# Patient Record
Sex: MALE | Race: WHITE | NOT HISPANIC OR LATINO | Employment: FULL TIME | URBAN - METROPOLITAN AREA
[De-identification: names, ages, dates, MRNs, and addresses within clinical notes are randomized per-mention and may not be internally consistent; named-entity substitution may affect disease eponyms.]

---

## 2017-02-02 ENCOUNTER — TRANSCRIBE ORDERS (OUTPATIENT)
Dept: ADMINISTRATIVE | Facility: HOSPITAL | Age: 57
End: 2017-02-02

## 2017-02-02 DIAGNOSIS — C64.9 ADENOCARCINOMA, RENAL CELL, UNSPECIFIED LATERALITY (HCC): Primary | ICD-10-CM

## 2017-02-20 ENCOUNTER — HOSPITAL ENCOUNTER (OUTPATIENT)
Dept: CT IMAGING | Facility: HOSPITAL | Age: 57
Discharge: HOME | End: 2017-02-20
Payer: COMMERCIAL

## 2017-02-20 DIAGNOSIS — C64.9 ADENOCARCINOMA, RENAL CELL, UNSPECIFIED LATERALITY (HCC): ICD-10-CM

## 2017-02-21 ENCOUNTER — HOSPITAL ENCOUNTER (OUTPATIENT)
Dept: CT IMAGING | Facility: HOSPITAL | Age: 57
Discharge: HOME | End: 2017-02-21
Payer: COMMERCIAL

## 2017-02-21 PROCEDURE — 74176 CT ABD & PELVIS W/O CONTRAST: CPT

## 2017-04-04 ENCOUNTER — GENERIC CONVERSION - ENCOUNTER (OUTPATIENT)
Dept: OTHER | Facility: OTHER | Age: 57
End: 2017-04-04

## 2017-08-04 ENCOUNTER — TRANSCRIBE ORDERS (OUTPATIENT)
Dept: ADMINISTRATIVE | Facility: HOSPITAL | Age: 57
End: 2017-08-04

## 2017-08-04 DIAGNOSIS — C61 MALIGNANT NEOPLASM PROSTATE (HCC): Primary | ICD-10-CM

## 2017-08-04 DIAGNOSIS — C61 MALIGNANT NEOPLASM OF PROSTATE (HCC): ICD-10-CM

## 2017-08-04 DIAGNOSIS — C64.1 RENAL CARCINOMA, RIGHT (HCC): ICD-10-CM

## 2017-08-14 ENCOUNTER — HOSPITAL ENCOUNTER (OUTPATIENT)
Dept: RADIOLOGY | Facility: HOSPITAL | Age: 57
Discharge: HOME | End: 2017-08-14
Payer: COMMERCIAL

## 2017-08-14 ENCOUNTER — HOSPITAL ENCOUNTER (OUTPATIENT)
Dept: CT IMAGING | Facility: HOSPITAL | Age: 57
Discharge: HOME | End: 2017-08-14
Payer: COMMERCIAL

## 2017-08-14 DIAGNOSIS — C61 MALIGNANT NEOPLASM PROSTATE (HCC): ICD-10-CM

## 2017-08-14 DIAGNOSIS — C64.1 RENAL CARCINOMA, RIGHT (HCC): ICD-10-CM

## 2017-08-14 PROCEDURE — 74176 CT ABD & PELVIS W/O CONTRAST: CPT

## 2017-08-14 PROCEDURE — 71020 HB CHEST X-RAY 2VW FRONTAL&LATL: CPT

## 2017-12-28 ENCOUNTER — ALLSCRIPTS OFFICE VISIT (OUTPATIENT)
Dept: OTHER | Facility: OTHER | Age: 57
End: 2017-12-28

## 2017-12-28 ENCOUNTER — TRANSCRIBE ORDERS (OUTPATIENT)
Dept: ADMINISTRATIVE | Facility: HOSPITAL | Age: 57
End: 2017-12-28

## 2017-12-28 DIAGNOSIS — M54.5 LOW BACK PAIN, UNSPECIFIED BACK PAIN LATERALITY, UNSPECIFIED CHRONICITY, WITH SCIATICA PRESENCE UNSPECIFIED: Primary | ICD-10-CM

## 2017-12-28 DIAGNOSIS — M48.00 SPINAL STENOSIS: ICD-10-CM

## 2017-12-29 NOTE — PROGRESS NOTES
Assessment   1  Spinal stenosis (724 00) (M48 00)   2  Lumbar foraminal stenosis (724 02) (M99 83)   3  Lumbar spondylosis (721 3) (M47 816)    Plan   Spinal stenosis    · * MRI LUMBAR SPINE WO CONTRAST; Status:Need Information - Financial    Authorization; Requested for:[id number];     Discussion/Summary      He will need a repeat fresh MRI to determine where we will go next  I reminded him that physical therapy would be worthwhile and possibly seeing Pain Management for the consideration of some epidural steroids  Chief Complaint   PT IS HERE FOR WORSENING OF HIS LOWER BACK PAIN  PT IS COMPLETING HIS PHQ-9  PT WILL SCHEDULE FOR FBW AND PHYSICAL  Advance Directives   [facility]    The patient is not in agreement to receive the Advance Care Planning service--       NO - Patient does not have an advance health care directive  History of Present Illness   HPI: The patient is here with complaints of worsening low back pain  Two years ago he was evaluated for spinal stenosis and worsening pain in his low back  At the time it was found that he had a mass on his kidney  He never had further follow-up since he was then referred to Urology decided to go to the [address] [place] [address] had a nephrectomy done then was diagnosed with prostate cancer and had treatment for that  He reports that he has been under the care of the urologist but now his back pain is worse than ever  He can work for approximately 5 minutes and then has to stop due to a squeezing sensation in his low back radiating down the legs  He denies any saddle numbness or incontinence  He is unable to take anti-inflammatories due to the solitary kidney  Review of Systems        Constitutional: no fever or chills, feels well, no tiredness, no recent weight loss or weight gain  ENT: no complaints of earache, no loss of hearing, no nosebleeds or nasal discharge, no sore throat or hoarseness        Cardiovascular: no complaints of slow or fast heart rate, no chest pain, no palpitations, no leg claudication or lower extremity edema  Respiratory: no complaints of shortness of breath, no wheezing or cough, no dyspnea on exertion, no orthopnea or PND  Gastrointestinal: no complaints of abdominal pain, no constipation, no nausea or vomiting, no diarrhea or bloody stools  Genitourinary: no complaints of dysuria or incontinence, no hesitancy, no nocturia, no genital lesion, no inadequacy of penile erection  Musculoskeletal: Low back pain  Integumentary: no complaints of skin rash or lesion, no itching or dry skin, no skin wounds  Neurological: no complaints of headache, no confusion, no numbness or tingling, no dizziness or fainting  Active Problems   1  Abnormal behavior (796 4) (R46 89)   2  Accelerated essential hypertension (401 0) (I10)   3  Atypical chest pain (786 59) (R07 89)   4  Benign essential hypertension (401 1) (I10)   5  CA of prostate (185) (C61)   6  Counseling About Travel   7  De Quervain's tenosynovitis (727 04) (M65 4)   8  Encounter for screening for malignant neoplasm of colon (V76 51) (Z12 11)   9  Esophageal reflux (530 81) (K21 9)   10  Fatigue (780 79) (R53 83)   11  Hematuria (599 70) (R31 9)   12  Hyperlipidemia (272 4) (E78 5)   13  Impaired fasting glucose (790 21) (R73 01)   14  Lower back pain (724 2) (M54 5)   15  Need for influenza vaccination (V04 81) (Z23)   16  Nonvenomous insect bite (919 4,E906 4) (W57 XXXA)   17  Obesity (278 00) (E66 9)   18  Plantar fasciitis (728 71) (M72 2)   19  Polyp of sigmoid colon (211 3) (D12 5)   20  Renal cell carcinoma, right (189 0) (C64 1)   21  Right kidney mass (593 9) (N28 89)   22  Screening PSA (prostate specific antigen) (V76 44) (Z12 5)   23  Seizure, epileptic (345 90) (G40 909)   24  Sleep apnea (780 57) (G47 30)   25  Spinal stenosis (724 00) (M48 00)   26  Symptoms involving urinary system (788 99) (R39 9)   41  Tendonitis (726 90) (M77 9)   28  Tick bite (919 4,E906 4) [facility])    Past Medical History   1  History of back problems   2  History of hyperglycemia (V12 29) (Z86 39)   3  History of Lightheadedness (780 4) (R42)   4  History of Malignant Melanoma Of The Skin (V10 82)   5  History of Microscopic hematuria (599 72) (R31 29)  Active Problems And Past Medical History Reviewed: The active problems and past medical history were reviewed and updated today  Family History   Mother    1  Family history of essential hypertension (V17 49) (Z82 49)  Father    2  Family history of diabetes mellitus (V18 0) (Z83 3)   3  Family history of Involuntary Shaking Or Trembling Movements (Tremor)  Brother    4  Family history of essential hypertension (V17 49) (Z82 49)  Family History Reviewed: The family history was reviewed and updated today  Social History    ·    · Never A Smoker   · Never Drank Alcohol   · Nonsmoker (V49 89) (Z78 9)  The social history was reviewed and updated today  Surgical History   1  History of Cholecystectomy   2  History of Hernia Repair  Surgical History Reviewed: The surgical history was reviewed and updated today  Current Meds    1  Phenytoin Sodium Extended 100 MG Oral Capsule; TAKE 1 CAPSULE 3 TIMES DAILY; Therapy: 04FMU2342 to (Evaluate:61Eiq5244)  Requested for: 37GEZ0513; Last     Rx:17Oct2017 Ordered   2  Valsartan-Hydrochlorothiazide 160-12 5 MG Oral Tablet; take 1 tablet by mouth daily; Therapy: 05SZU4148 to (Evaluate:98Gzk6267)  Requested for: 11Aug2017; Last     Rx:11Aug2017 Ordered     The medication list was reviewed and updated today  Allergies   1  Advair Diskus MISC   2   Cipro SOLN    Vitals    Recorded: 89Syy7513 02:18PM   Temperature 97 4 F   Heart Rate 72   Systolic 183   Diastolic 99   Height 5 ft 7 in   Weight 246 lb    BMI Calculated 38 53   BSA Calculated 2 21   O2 Saturation 97     Physical Exam        Constitutional General appearance: No acute distress, well appearing and well nourished  Eyes      Conjunctiva and lids: No swelling, erythema, or discharge  Pupils and irises: Equal, round and reactive to light  Ears, Nose, Mouth, and Throat      External inspection of ears and nose: Normal        Otoscopic examination: Tympanic membrance translucent with normal light reflex  Canals patent without erythema  Nasal mucosa, septum, and turbinates: Normal without edema or erythema  Oropharynx: Normal with no erythema, edema, exudate or lesions  Pulmonary      Respiratory effort: No increased work of breathing or signs of respiratory distress  Auscultation of lungs: Clear to auscultation, equal breath sounds bilaterally, no wheezes, no rales, no rhonci  Cardiovascular      Auscultation of heart: Normal rate and rhythm, normal S1 and S2, without murmurs  Examination of extremities for edema and/or varicosities: Normal        Carotid pulses: Normal        Abdomen      Abdomen: Abnormal  -- Obesity  Liver and spleen: No hepatomegaly or splenomegaly  Lymphatic      Palpation of lymph nodes in neck: No lymphadenopathy  Musculoskeletal      Gait and station: Normal        Digits and nails: Normal without clubbing or cyanosis  Inspection/palpation of joints, bones, and muscles: Abnormal  -- Tender across lumbar spine  Skin      Skin and subcutaneous tissue: Normal without rashes or lesions  Neurologic      Cranial nerves: Cranial nerves 2-12 intact  Reflexes: Abnormal  -- Absent patellar tender reflexes bilaterally  Sensation: No sensory loss         Psychiatric      Orientation to person, place and time: Normal        Mood and affect: Normal           Signatures    Electronically signed by : Juan M Farmer DO; Dec 28 2017  2:56PM EST                       (Author)

## 2018-01-09 ENCOUNTER — GENERIC CONVERSION - ENCOUNTER (OUTPATIENT)
Dept: OTHER | Facility: OTHER | Age: 58
End: 2018-01-09

## 2018-01-09 ENCOUNTER — HOSPITAL ENCOUNTER (OUTPATIENT)
Dept: MRI IMAGING | Facility: HOSPITAL | Age: 58
Discharge: HOME | End: 2018-01-09
Payer: COMMERCIAL

## 2018-01-09 DIAGNOSIS — M48.00 SPINAL STENOSIS: ICD-10-CM

## 2018-01-09 PROCEDURE — 72148 MRI LUMBAR SPINE W/O DYE: CPT

## 2018-01-16 NOTE — RESULT NOTES
Verified Results  (LC) PSA Total (Reflex To Free) 01PRH1471 12:00AM Dara Tobar     Test Name Result Flag Reference   Prostate Specific Ag, Serum 4 0 ng/mL  0 0-4 0   Roche ECLIA methodology  According to the American Urological Association, Serum PSA should  decrease and remain at undetectable levels after radical  prostatectomy  The AUA defines biochemical recurrence as an initial  PSA value 0 2 ng/mL or greater followed by a subsequent confirmatory  PSA value 0 2 ng/mL or greater  Values obtained with different assay methods or kits cannot be used  interchangeably  Results cannot be interpreted as absolute evidence  of the presence or absence of malignant disease  Reflex Criteria Comment     The percent free PSA is performed on a reflex basis only when the  total PSA is between 4 0 and 10 0 ng/mL  PSA, Free 0 78 ng/mL  N/A   Roche ECLIA methodology  % Free PSA 19 5 %     The table below lists the probability of prostate cancer for  men with non-suspicious JERICHO results and total PSA between  4 and 10 ng/mL, by patient age [facility], [address] [place] [address],  [phone])  % Free PSA       50-64 yr        65-75 yr                    0 00-10 00%        56%             55%                   10 01-15 00%        24%             35%                   15 01-20 00%        17%             23%                   20 01-25 00%        10%             20%                        >25 00%         5%              9%  Please note:  Claudia et al did not make specific                recommendations regarding the use of                percent free PSA for any other population                of men  Discussion/Summary   PSA is slightly higher at 4 0  He should be seen by urology with specific attention to the fact that his PSA is elevated at 4 0

## 2018-01-16 NOTE — RESULT NOTES
Verified Results  (1) BASIC METABOLIC PROFILE [id number] 12:00AM Evan Bejarano     Test Name Result Flag Reference   Glucose, Serum 101 mg/dL H 65-99   BUN 19 mg/dL  6-24   Creatinine, Serum 1 22 mg/dL  0 76-1 27   eGFR If NonAfricn Am 65 mL/min/1 73  >59   eGFR If Africn Am 76 mL/min/1 73  >59   BUN/Creatinine Ratio 16  9-20   Sodium, Serum 142 mmol/L  134-144   Potassium, Serum 4 7 mmol/L  3 5-5 2   Chloride, Serum 103 mmol/L     Carbon Dioxide, Total 22 mmol/L  18-29   Calcium, Serum 9 0 mg/dL  8 7-10 2       Discussion/Summary   lab is normal

## 2018-01-23 ENCOUNTER — ALLSCRIPTS OFFICE VISIT (OUTPATIENT)
Dept: OTHER | Facility: OTHER | Age: 58
End: 2018-01-23

## 2018-01-23 VITALS
OXYGEN SATURATION: 97 % | BODY MASS INDEX: 38.61 KG/M2 | HEIGHT: 67 IN | TEMPERATURE: 97.4 F | HEART RATE: 72 BPM | DIASTOLIC BLOOD PRESSURE: 99 MMHG | SYSTOLIC BLOOD PRESSURE: 138 MMHG | WEIGHT: 246 LBS

## 2018-01-23 NOTE — RESULT NOTES
Discussion/Summary   lumbar spine with lots of degenerative disease  first step id physical therapy and evaluation by pain management     Verified Results  * MRI LUMBAR SPINE [address] [id number] 06:28AM Tolu Quirozila Order Number: [id number]    - Patient Instructions: To schedule this appointment, please contact Central Scheduling at [phone]  Test Name Result Flag Reference   MRI LUMBAR SPINE [address] (Report)     MRI LUMBAR SPINE WITHOUT CONTRAST     INDICATION: 58-year-old female, chronic low back pain, leg pain   COMPARISON: 10/20/2015 MRI     TECHNIQUE: Sagittal T1, sagittal T2, sagittal inversion recovery, axial T1 and axial T2, coronal T2       IMAGE QUALITY: Diagnostic     FINDINGS:     ALIGNMENT:    Grade 1 anterolisthesis L5-S1 related to bilateral L5 spondylolysis, unchanged  Minimal grade 1 retrolisthesis L4-5, unchanged  No compression fracture  No scoliosis  MARROW SIGNAL: Normal marrow signal is identified within the visualized bony structures  No discrete marrow lesion  DISTAL CORD AND CONUS: Normal size and signal within the distal cord and conus  The conus ends at the L1 level  PARASPINAL SOFT TISSUES: Status post right nephrectomy, consistent with 8/14/2017 abdominopelvic CT  SACRUM: Normal signal within the sacrum  No evidence of insufficiency or stress fracture  LOWER THORACIC DISC SPACES: Normal disc height and signal  No disc herniation, canal stenosis or foraminal narrowing  LUMBAR DISC SPACES:        L1-L2: Normal      L2-L3: Diminished small broad left foraminal disc protrusion, improved     L3-L4: Mild degenerative spondylosis, minimal bilateral foraminal stenosis, stable     L4-L5: Mild degenerative disc and facet/ligamentous disease, grade 1 retrolisthesis, minimal right foraminal stenosis, stable       L5-S1: Moderate degenerative disc disease, progressive severe degenerative facet hypertrophy, grade 1 anterolisthesis related to bilateral pars defects, progressive moderate to severe bilateral foraminal stenosis   Persistent epidural lipomatosis at L5 and sacral levels       IMPRESSION:   Progressive multilevel degenerative spondylosis     Diminished small broad left foraminal disc protrusion L2-3     Progressive moderate to severe bilateral foraminal stenosis L5-S1     Persistent grade 1 anterolisthesis L5-S1 related to bilateral L5 spondylolysis, unchanged     Persistent epidural lipomatosis L5 and sacral levels       Workstation performed: [id number]     Signed by:   Lucia Urbano MD   1/9/18

## 2018-01-24 NOTE — CONSULTS
Assessment  Assessed    1  Lumbar foraminal stenosis (724 02) (M99 83)   2  Lower back pain (724 2) (M54 5)    Discussion/Summary    DL's pain persists despite time, relative rest, activity modification and therapy  I believe that he would benefit from lumbar epidural steroid injection to diminish any inflammatory component of his pain  We will initially use an interlaminar approach  The injection may need to be repeated or alternate approach utilized based on the degree of pain relief following the initial injection  In the office today, we reviewed the nature of DL's pathology in depth using diagrams and models  We discussed the approach we would use for the epidural steroid injection and provided literature for home review  The patient understands the risks associated with the procedure including bleeding, infection, tissue injury, allergic reaction and paralysis and provided written and verbal consent in the office today  If his pain is not relieved with the aforementioned treatment plan 1 would consider diagnostic medial branch blocks determine if his pain is affect facet in origin  The patient has the current Goals: Decreased pain so we can increase his function exercise more and lose weight  The patent has the current Barriers: Lumbar spondylosis  Patient is able to Self-Care  Educational resources provided: Information interlaminar epidural steroid injection  The treatment plan was reviewed with the patient/guardian  The patient/guardian understands and agrees with the treatment plan   The patient was counseled regarding diagnostic results, instructions for management, risk factor reductions, prognosis, risks and benefits of treatment options and importance of compliance with treatment  Chief Complaint  Chief Complaints    1  Pain    History of Present Illness  Pleasant 52-year-old gentleman with approximately 3 year history of low back pain worsening over the past year   With his initial MRI 3 years ago who is found to have kidney cancer so socially he underwent a number of surgeries  In the past she has undergone acupuncture hypnosis chiropractic treatment osteopathic treatment with mild to moderate relief but is symptoms persist         I personally reviewed the patient's past medical history, surgical history, allergies, current medications social history, family history and review of systems  Full intake form reviewed with the patient  Referring physician is  Dr Leonila Vieira   Primary Care physician is  Dr Subha Monzon presents with complaints of constant episodes of moderate left lower back pain, described as throbbing  On a scale of 1 to 10, the patient rates the pain as 3  Episodes years ago  His symptoms are caused by no known event  Symptoms are improved by rest  Symptoms are made worse by walking, exercise and bending  Symptoms are unchanged  Review of Systems    Constitutional: no fever, no recent weight gain and no recent weight loss  Eyes: no double vision and no blurry vision  Cardiovascular: no chest pain, no palpitations and no lower extremity edema  Respiratory: no complaints of shortness of breath and no wheezing  Musculoskeletal: no difficulty walking, no muscle weakness, no joint stiffness, no joint swelling, no limb swelling, no pain in extremity and no decreased range of motion  Neurological: no dizziness, no difficulty swallowing, no memory loss, no loss of consciousness and no seizures  Gastrointestinal: no nausea, no vomiting, no constipation and no diarrhea  Genitourinary: no difficulty initiating urine stream, no genital pain and no frequent urination  Integumentary: no complaints of skin rash  Psychiatric: no depression  Endocrine: no excessive thirst, no adrenal disease, no hypothyroidism and no hyperthyroidism  Hematologic/Lymphatic: no tendency for easy bruising and no tendency for easy bleeding       ROS reviewed  Active Problems  Problems    1  Accelerated essential hypertension (401 0) (I10)   2  Acquired spondylolisthesis (738 4) (M43 10)   3  Atypical chest pain (786 59) (R07 89)   4  Benign essential hypertension (401 1) (I10)   5  CA of prostate (185) (C61)   6  Counseling About Travel   7  De Quervain's tenosynovitis (727 04) (M65 4)   8  Depression screening (V79 0) (Z13 89)   9  Encounter for screening for malignant neoplasm of colon (V76 51) (Z12 11)   10  Esophageal reflux (530 81) (K21 9)   11  Fatigue (780 79) (R53 83)   12  Hematuria (599 70) (R31 9)   13  Hyperlipidemia (272 4) (E78 5)   14  Impaired fasting glucose (790 21) (R73 01)   15  Lower back pain (724 2) (M54 5)   16  Lumbar foraminal stenosis (724 02) (M99 83)   17  Lumbar spondylosis (721 3) (M47 816)   18  Need for influenza vaccination (V04 81) (Z23)   19  Nonvenomous insect bite (919 4,E906 4) (W57 XXXA)   20  Obesity (278 00) (E66 9)   21  Plantar fasciitis (728 71) (M72 2)   22  Polyp of sigmoid colon (211 3) (D12 5)   23  Renal cell carcinoma, right (189 0) (C64 1)   24  Right kidney mass (593 9) (N28 89)   25  Screening PSA (prostate specific antigen) (V76 44) (Z12 5)   26  Seizure, epileptic (345 90) (G40 909)   27  Sleep apnea (780 57) (G47 30)   28  Spinal stenosis (724 00) (M48 00)    Past Medical History  Problems    1  History of back problems   2  History of hyperglycemia (V12 29) (Z86 39)   3  History of Lightheadedness (780 4) (R42)   4  History of Malignant Melanoma Of The Skin (V10 82)   5  History of Microscopic hematuria (599 72) (R31 29)    The active problems and past medical history were reviewed and updated today  Surgical History  Problems    1  History of Cholecystectomy   2  History of Hernia Repair    The surgical history was reviewed and updated today  Family History  Mother    1  Family history of essential hypertension (V17 49) (Z82 49)  Father    2   Family history of diabetes mellitus (V18 0) (Z83 3)   3  Family history of Involuntary Shaking Or Trembling Movements (Tremor)  Brother    4  Family history of essential hypertension (V17 49) (Z82 49)    The family history was reviewed and updated today  Social History  Problems    ·    · Never A Smoker   · Never Drank Alcohol   · Nonsmoker (V49 89) (Z78 9)  The social history was reviewed and updated today  Current Meds   1  Phenytoin Sodium Extended 100 MG Oral Capsule; TAKE 1 CAPSULE 3 TIMES DAILY; Therapy: 90ZQL2330 to (Evaluate:38Uej7563)  Requested for: 15TOL8892; Last   Rx:17Oct2017 Ordered   2  Valsartan-Hydrochlorothiazide 160-12 5 MG Oral Tablet; take 1 tablet by mouth daily; Therapy: 54YHO7801 to (Evaluate:60Cra8292)  Requested for: 11Aug2017; Last   Rx:11Aug2017 Ordered    The medication list was reviewed and updated today  Allergies  Medication    1  Advair Diskus MISC   2  Cipro SOLN    Vitals  Vital Signs    Recorded: 23Jan2018 07:02AM   Temperature 98 1 F   Heart Rate 80   Systolic 344   Diastolic 64   Height 5 ft 7 in   Weight 243 lb 2 oz   BMI Calculated 38 08   BSA Calculated 2 2   Pain Scale 3     Physical Exam    Constitutional   General appearance: Abnormal   overweight  Eyes   Sclera: anicteric   HEENT   Hearing grossly intact  Neck   Neck: Supple, symmetric, trachea midline, no masses  Pulmonary   Respiratory effort: Even and unlabored  Cardiovascular   Examination of extremities: No edema or pitting edema present  Skin   Skin and subcutaneous tissue: Normal without rashes or lesions, well hydrated  Psychiatric   Mood and affect: Mood and affect appropriate  Neurologic   Cranial nerves: Cranial nerves II-XII grossly intact  Musculoskeletal   Gait and station: Normal     Lumbar/Sacral Spine examination demonstrates  Inspection: Normal station and gait  Normal lumbar lordotic curve with no significant scoliosis or spinal step-off  Skin intact without erythema   No gross mass or muscle atrophy  Palpation: There is no tenderness to palpation overlying the sacroiliac joint as well as the ischial bursa bilateral  No significant tenderness over the greater trochanteric bursa bilaterally  Motor/Strength: 5/5 strength in the bilateral lower limbs  The patient is able to heel and/toe walk  Tandem gait is intact  Reflexes: Deep tendon reflex are 2+ and symmetrical bilateral patella and Achilles  Sensation: Sensation intact to light touch and pinprick in the bilateral lower limbs  Proprioception is intact at bilateral hallux  Maneuvers: Negative bilateral straight leg raising  Negative Donnell's maneuver  Results/Data  Procedure Flowsheet [id number] 07:09AM Joey Emani     Test Name Result Flag Reference   Oswestry Score 42         Results    I personally reviewed the films/images in the office today  * MRI LUMBAR SPINE WO CONTRAST [id number] 06:28AM Giana Maya Order Number: [id number]    - Patient Instructions: To schedule this appointment, please contact Central Scheduling at [phone]  Test Name Result Flag Reference   MRI LUMBAR SPINE [address] (Report)     MRI LUMBAR SPINE WITHOUT CONTRAST     INDICATION: 49-year-old female, chronic low back pain, leg pain   COMPARISON: 10/20/2015 MRI     TECHNIQUE: Sagittal T1, sagittal T2, sagittal inversion recovery, axial T1 and axial T2, coronal T2       IMAGE QUALITY: Diagnostic     FINDINGS:     ALIGNMENT:    Grade 1 anterolisthesis L5-S1 related to bilateral L5 spondylolysis, unchanged  Minimal grade 1 retrolisthesis L4-5, unchanged  No compression fracture  No scoliosis  MARROW SIGNAL: Normal marrow signal is identified within the visualized bony structures  No discrete marrow lesion  DISTAL CORD AND CONUS: Normal size and signal within the distal cord and conus  The conus ends at the L1 level  PARASPINAL SOFT TISSUES: Status post right nephrectomy, consistent with 8/14/2017 abdominopelvic CT       SACRUM: Normal signal within the sacrum  No evidence of insufficiency or stress fracture  LOWER THORACIC DISC SPACES: Normal disc height and signal  No disc herniation, canal stenosis or foraminal narrowing  LUMBAR DISC SPACES:        L1-L2: Normal      L2-L3: Diminished small broad left foraminal disc protrusion, improved     L3-L4: Mild degenerative spondylosis, minimal bilateral foraminal stenosis, stable     L4-L5: Mild degenerative disc and facet/ligamentous disease, grade 1 retrolisthesis, minimal right foraminal stenosis, stable       L5-S1: Moderate degenerative disc disease, progressive severe degenerative facet hypertrophy, grade 1 anterolisthesis related to bilateral pars defects, progressive moderate to severe bilateral foraminal stenosis   Persistent epidural lipomatosis at L5 and sacral levels       IMPRESSION:   Progressive multilevel degenerative spondylosis     Diminished small broad left foraminal disc protrusion L2-3     Progressive moderate to severe bilateral foraminal stenosis L5-S1     Persistent grade 1 anterolisthesis L5-S1 related to bilateral L5 spondylolysis, unchanged     Persistent epidural lipomatosis L5 and sacral levels       Workstation performed: [id number]     Signed by:   Afshin Ragsdale MD   1/9/18     Signatures   Electronically signed by : Sin Zuniga DO; Jan 23 2018  7:26AM EST                       (Author)

## 2018-02-08 ENCOUNTER — HOSPITAL ENCOUNTER (OUTPATIENT)
Dept: RADIOLOGY | Facility: CLINIC | Age: 58
Discharge: HOME | End: 2018-02-08
Attending: ANESTHESIOLOGY | Admitting: ANESTHESIOLOGY
Payer: COMMERCIAL

## 2018-02-08 VITALS
RESPIRATION RATE: 18 BRPM | TEMPERATURE: 98.2 F | OXYGEN SATURATION: 96 % | DIASTOLIC BLOOD PRESSURE: 88 MMHG | SYSTOLIC BLOOD PRESSURE: 141 MMHG | HEART RATE: 59 BPM

## 2018-02-08 DIAGNOSIS — M51.17 INTERVERTEBRAL DISC DISORDER WITH RADICULOPATHY OF LUMBOSACRAL REGION: ICD-10-CM

## 2018-02-08 DIAGNOSIS — M48.07 LUMBOSACRAL STENOSIS: ICD-10-CM

## 2018-02-08 PROCEDURE — 62323 NJX INTERLAMINAR LMBR/SAC: CPT | Performed by: ANESTHESIOLOGY

## 2018-02-08 RX ORDER — METHYLPREDNISOLONE ACETATE 80 MG/ML
160 INJECTION, SUSPENSION INTRA-ARTICULAR; INTRALESIONAL; INTRAMUSCULAR; SOFT TISSUE ONCE
Status: COMPLETED | OUTPATIENT
Start: 2018-02-08 | End: 2018-02-08

## 2018-02-08 RX ORDER — LIDOCAINE HYDROCHLORIDE 10 MG/ML
5 INJECTION, SOLUTION EPIDURAL; INFILTRATION; INTRACAUDAL; PERINEURAL ONCE
Status: COMPLETED | OUTPATIENT
Start: 2018-02-08 | End: 2018-02-08

## 2018-02-08 RX ADMIN — METHYLPREDNISOLONE ACETATE 160 MG: 80 INJECTION, SUSPENSION INTRA-ARTICULAR; INTRALESIONAL; INTRAMUSCULAR; SOFT TISSUE at 15:10

## 2018-02-08 RX ADMIN — IOHEXOL 1 ML: 300 INJECTION, SOLUTION INTRAVENOUS at 15:10

## 2018-02-08 RX ADMIN — LIDOCAINE HYDROCHLORIDE 5 ML: 10 INJECTION, SOLUTION EPIDURAL; INFILTRATION; INTRACAUDAL; PERINEURAL at 15:10

## 2018-02-08 NOTE — DISCHARGE INSTRUCTIONS
Epidural Steroid Injection   WHAT YOU NEED TO KNOW:   An epidural steroid injection (ABHI) is a procedure to inject steroid medicine into the epidural space  The epidural space is between your spinal cord and vertebrae  Steroids reduce inflammation and fluid buildup in your spine that may be causing pain  You may be given pain medicine along with the steroids  ACTIVITY  · Do not drive or operate machinery today  · No strenuous activity today - bending, lifting, etc   · You may resume normal activites starting tomorrow - start slowly and as tolerated  · You may shower today, but no tub baths or hot tubs  · You may have numbness for several hours from the local anesthetic  Please use caution and common sense, especially with weight-bearing activities  CARE OF THE INJECTION SITE  · If you have soreness or pain, apply ice to the area today (20 minutes on/20 minutes off)  · Starting tomorrow, you may use warm, moist heat or ice if needed  · You may have an increase or change in your discomfort for 36-48 hours after your treatment  · Apply ice and continue with any pain medication you have been prescribed  · Notify [facility] if you have any of the following: redness, drainage, swelling, headache, stiff neck or fever above 100°F     SPECIAL INSTRUCTIONS  · Our office will contact you in approximately 7 days for a progress report  MEDICATIONS  · Continue to take all routine medications  · Our office may have instructed you to hold some medications  If you have a problem specifically related to your procedure, please call our office at [phone]  Problems not related to your procedure should be directed to your primary care physician

## 2018-02-08 NOTE — H&P
History of Present Illness: The patient is a 59 y o  male who presents with complaints of low back pain    There is no problem list on file for this patient  No past medical history on file  No past surgical history on file  No current outpatient prescriptions on file  No Known Allergies    Physical Exam:   Vitals:    02/08/18 1442   BP: 128/90   Pulse: 68   Resp: 18   Temp: 98 2 °F (36 8 °C)   SpO2: 98%     General: Awake, Alert, Oriented x 3, Mood and affect appropriate  Respiratory: Respirations even and unlabored  Cardiovascular: Peripheral pulses intact; no edema  Musculoskeletal Exam:  Decreased range of motion lumbar spine    ASA Score: II    Assessment:   1  Lumbosacral stenosis    2   Intervertebral disc disorder with radiculopathy of lumbosacral region        Plan: MERLE ORTEGA LT

## 2018-02-15 ENCOUNTER — TELEPHONE (OUTPATIENT)
Dept: PAIN MEDICINE | Facility: CLINIC | Age: 58
End: 2018-02-15

## 2018-02-15 NOTE — TELEPHONE ENCOUNTER
1st attempt LM to [facility] needing %of relief and pain level       S/P LESI TO LT #1 on 02/08/2018 w/SL [place]  LESI TO LT #2 scheduled on 02/22/2018

## 2018-02-15 NOTE — TELEPHONE ENCOUNTER
Pt returning call and the first couple of days he was good  But now its the same as before  About 50% relief   # [phone]

## 2018-02-15 NOTE — TELEPHONE ENCOUNTER
Spoke with pt wife and she stated that pt had 50% relief following his injection  Pt pain level is high again wife stated  Explained to pt wife that it could take up to 2weeks to get the full effect of the steroid   Also confirmed pt second LESI scheduled for 02/22/18

## 2018-02-21 ENCOUNTER — TELEPHONE (OUTPATIENT)
Dept: RADIOLOGY | Facility: CLINIC | Age: 58
End: 2018-02-21

## 2018-02-21 NOTE — TELEPHONE ENCOUNTER
S/w pt, advsed that as part of the transition to [facility], a medication reconciliation will be completed at your procedure tomorrow  In order to expedite this process, please bring an up to date list of medications both otc and prescribed  Pt verbalized understanding and appreciation

## 2018-02-22 ENCOUNTER — HOSPITAL ENCOUNTER (OUTPATIENT)
Dept: RADIOLOGY | Facility: CLINIC | Age: 58
Discharge: HOME | End: 2018-02-22
Attending: ANESTHESIOLOGY
Payer: COMMERCIAL

## 2018-02-22 VITALS
SYSTOLIC BLOOD PRESSURE: 129 MMHG | OXYGEN SATURATION: 96 % | TEMPERATURE: 97.5 F | HEART RATE: 72 BPM | DIASTOLIC BLOOD PRESSURE: 72 MMHG | RESPIRATION RATE: 18 BRPM

## 2018-02-22 DIAGNOSIS — M51.17 INTERVERTEBRAL DISC DISORDER WITH RADICULOPATHY OF LUMBOSACRAL REGION: ICD-10-CM

## 2018-02-22 DIAGNOSIS — M48.07 LUMBOSACRAL STENOSIS: ICD-10-CM

## 2018-02-22 PROCEDURE — 62323 NJX INTERLAMINAR LMBR/SAC: CPT | Performed by: ANESTHESIOLOGY

## 2018-02-22 RX ORDER — TADALAFIL 20 MG/1
20 TABLET ORAL
COMMUNITY
Start: 2017-02-24 | End: 2018-04-23

## 2018-02-22 RX ORDER — PHENYTOIN SODIUM 100 MG/1
300 CAPSULE, EXTENDED RELEASE ORAL DAILY
COMMUNITY
End: 2018-02-23 | Stop reason: SDUPTHER

## 2018-02-22 RX ORDER — VALSARTAN AND HYDROCHLOROTHIAZIDE 160; 12.5 MG/1; MG/1
1 TABLET, FILM COATED ORAL DAILY
COMMUNITY
Start: 2016-09-08 | End: 2018-02-23 | Stop reason: SDUPTHER

## 2018-02-22 RX ORDER — METHYLPREDNISOLONE ACETATE 80 MG/ML
160 INJECTION, SUSPENSION INTRA-ARTICULAR; INTRALESIONAL; INTRAMUSCULAR; PARENTERAL; SOFT TISSUE ONCE
Status: COMPLETED | OUTPATIENT
Start: 2018-02-22 | End: 2018-02-22

## 2018-02-22 RX ORDER — LIDOCAINE HYDROCHLORIDE 10 MG/ML
5 INJECTION, SOLUTION EPIDURAL; INFILTRATION; INTRACAUDAL; PERINEURAL ONCE
Status: COMPLETED | OUTPATIENT
Start: 2018-02-22 | End: 2018-02-22

## 2018-02-22 RX ADMIN — METHYLPREDNISOLONE ACETATE 160 MG: 80 INJECTION, SUSPENSION INTRA-ARTICULAR; INTRALESIONAL; INTRAMUSCULAR; SOFT TISSUE at 15:10

## 2018-02-22 RX ADMIN — LIDOCAINE HYDROCHLORIDE 5 ML: 10 INJECTION, SOLUTION EPIDURAL; INFILTRATION; INTRACAUDAL; PERINEURAL at 15:10

## 2018-02-22 RX ADMIN — IOHEXOL 1 ML: 300 INJECTION, SOLUTION INTRAVENOUS at 15:15

## 2018-02-22 NOTE — H&P
History of Present Illness: The patient is a 59 y o  male who presents with complaints of low back and leg pain    There is no problem list on file for this patient  No past medical history on file  No past surgical history on file  Current Outpatient Prescriptions:     phenytoin (DILANTIN) 100 mg ER capsule, Take 300 mg by mouth daily, Disp: , Rfl:     tadalafil (CIALIS) 20 MG tablet, Take 20 mg by mouth, Disp: , Rfl:     valsartan-hydrochlorothiazide (DIOVAN-HCT) 160-12 5 MG per tablet, Take 1 tablet by mouth daily, Disp: , Rfl:     No Known Allergies    Physical Exam:   Vitals:    02/22/18 1446   BP: 122/85   Pulse: 71   Resp: 18   Temp: 97 5 °F (36 4 °C)   SpO2: 93%     General: Awake, Alert, Oriented x 3, Mood and affect appropriate  Respiratory: Respirations even and unlabored  Cardiovascular: Peripheral pulses intact; no edema  Musculoskeletal Exam:  Decreased range of motion lumbar spine    ASA Score: III    Assessment:   1  Lumbosacral stenosis    2   Intervertebral disc disorder with radiculopathy of lumbosacral region        Plan: LESI TO LT #2

## 2018-02-22 NOTE — DISCHARGE INSTRUCTIONS
Epidural Steroid Injection   WHAT YOU NEED TO KNOW:   An epidural steroid injection (ABHI) is a procedure to inject steroid medicine into the epidural space  The epidural space is between your spinal cord and vertebrae  Steroids reduce inflammation and fluid buildup in your spine that may be causing pain  You may be given pain medicine along with the steroids  ACTIVITY  · Do not drive or operate machinery today  · No strenuous activity today - bending, lifting, etc   · You may resume normal activites starting tomorrow - start slowly and as tolerated  · You may shower today, but no tub baths or hot tubs  · You may have numbness for several hours from the local anesthetic  Please use caution and common sense, especially with weight-bearing activities  CARE OF THE INJECTION SITE  · If you have soreness or pain, apply ice to the area today (20 minutes on/20 minutes off)  · Starting tomorrow, you may use warm, moist heat or ice if needed  · You may have an increase or change in your discomfort for 36-48 hours after your treatment  · Apply ice and continue with any pain medication you have been prescribed  · [facility]ify [facility] if you have any of the following: redness, drainage, swelling, headache, stiff neck or fever above 100°F     SPECIAL INSTRUCTIONS  · Our office will contact you in approximately 7 days for a progress report  MEDICATIONS  · Continue to take all routine medications  · Our office may have instructed you to hold some medications  If you have a problem specifically related to your procedure, please call our office at [phone]  Problems not related to your procedure should be directed to your primary care physician

## 2018-02-23 DIAGNOSIS — G40.909 SEIZURE DISORDER (HCC): Primary | ICD-10-CM

## 2018-02-23 DIAGNOSIS — I10 ESSENTIAL HYPERTENSION: ICD-10-CM

## 2018-02-23 RX ORDER — VALSARTAN AND HYDROCHLOROTHIAZIDE 160; 12.5 MG/1; MG/1
1 TABLET, FILM COATED ORAL DAILY
Qty: 30 TABLET | Refills: 5 | Status: SHIPPED | OUTPATIENT
Start: 2018-02-23 | End: 2018-10-30 | Stop reason: SDUPTHER

## 2018-02-23 RX ORDER — PHENYTOIN SODIUM 100 MG/1
CAPSULE, EXTENDED RELEASE ORAL
Qty: 90 CAPSULE | Refills: 3 | Status: SHIPPED | OUTPATIENT
Start: 2018-02-23 | End: 2018-07-06 | Stop reason: SDUPTHER

## 2018-03-01 ENCOUNTER — TELEPHONE (OUTPATIENT)
Dept: PAIN MEDICINE | Facility: CLINIC | Age: 58
End: 2018-03-01

## 2018-03-01 NOTE — TELEPHONE ENCOUNTER
1st attempt LM to [facility] needing %of relief and pain level         S/P LESI TO LT #2 on 02/22/18 w/SL Juanito Ribeiro  2wk f/u scheduled on 03/19/18 w/DG

## 2018-03-26 ENCOUNTER — TRANSCRIBE ORDERS (OUTPATIENT)
Dept: ADMINISTRATIVE | Facility: HOSPITAL | Age: 58
End: 2018-03-26

## 2018-03-26 DIAGNOSIS — C64.1 MALIGNANT NEOPLASM OF RIGHT KIDNEY, EXCEPT RENAL PELVIS (HCC): Primary | ICD-10-CM

## 2018-03-29 ENCOUNTER — HOSPITAL ENCOUNTER (OUTPATIENT)
Dept: RADIOLOGY | Facility: HOSPITAL | Age: 58
Discharge: HOME | End: 2018-03-29
Payer: COMMERCIAL

## 2018-03-29 ENCOUNTER — HOSPITAL ENCOUNTER (OUTPATIENT)
Dept: CT IMAGING | Facility: HOSPITAL | Age: 58
Discharge: HOME | End: 2018-03-29
Payer: COMMERCIAL

## 2018-03-29 ENCOUNTER — TRANSCRIBE ORDERS (OUTPATIENT)
Dept: ADMINISTRATIVE | Facility: HOSPITAL | Age: 58
End: 2018-03-29

## 2018-03-29 DIAGNOSIS — Z90.5 ACQUIRED ABSENCE OF KIDNEY: ICD-10-CM

## 2018-03-29 DIAGNOSIS — Z90.79 ACQUIRED ABSENCE OF GENITAL ORGANS: ICD-10-CM

## 2018-03-29 DIAGNOSIS — C64.1 MALIGNANT NEOPLASM OF RIGHT KIDNEY, EXCEPT RENAL PELVIS (HCC): ICD-10-CM

## 2018-03-29 DIAGNOSIS — C61 MALIGNANT NEOPLASM OF PROSTATE (HCC): ICD-10-CM

## 2018-03-29 DIAGNOSIS — C64.1 MALIGNANT NEOPLASM OF RIGHT KIDNEY, EXCEPT RENAL PELVIS (HCC): Primary | ICD-10-CM

## 2018-03-29 PROCEDURE — 74176 CT ABD & PELVIS W/O CONTRAST: CPT

## 2018-03-29 PROCEDURE — 71046 X-RAY EXAM CHEST 2 VIEWS: CPT

## 2018-04-03 ENCOUNTER — OFFICE VISIT (OUTPATIENT)
Dept: FAMILY MEDICINE CLINIC | Facility: CLINIC | Age: 58
End: 2018-04-03
Payer: COMMERCIAL

## 2018-04-03 VITALS
WEIGHT: 240 LBS | TEMPERATURE: 97.7 F | DIASTOLIC BLOOD PRESSURE: 70 MMHG | SYSTOLIC BLOOD PRESSURE: 128 MMHG | BODY MASS INDEX: 37.59 KG/M2 | HEART RATE: 92 BPM | OXYGEN SATURATION: 95 %

## 2018-04-03 DIAGNOSIS — Z13.1 SCREENING FOR DIABETES MELLITUS: ICD-10-CM

## 2018-04-03 DIAGNOSIS — I10 BENIGN ESSENTIAL HYPERTENSION: ICD-10-CM

## 2018-04-03 DIAGNOSIS — Z13.29 SCREENING FOR THYROID DISORDER: ICD-10-CM

## 2018-04-03 DIAGNOSIS — E78.5 HYPERLIPIDEMIA, UNSPECIFIED HYPERLIPIDEMIA TYPE: ICD-10-CM

## 2018-04-03 DIAGNOSIS — Z13.0 SCREENING FOR IRON DEFICIENCY ANEMIA: ICD-10-CM

## 2018-04-03 DIAGNOSIS — G47.33 OBSTRUCTIVE SLEEP APNEA SYNDROME: Primary | ICD-10-CM

## 2018-04-03 DIAGNOSIS — Z11.59 NEED FOR HEPATITIS C SCREENING TEST: ICD-10-CM

## 2018-04-03 PROBLEM — M48.061 LUMBAR FORAMINAL STENOSIS: Status: ACTIVE | Noted: 2017-12-28

## 2018-04-03 PROBLEM — M48.07 LUMBOSACRAL SPINAL STENOSIS: Status: ACTIVE | Noted: 2018-01-23

## 2018-04-03 PROBLEM — Z90.5 H/O UNILATERAL NEPHRECTOMY: Status: ACTIVE | Noted: 2017-08-22

## 2018-04-03 PROBLEM — M51.17 INTERVERTEBRAL DISC DISORDERS WITH RADICULOPATHY, LUMBOSACRAL REGION: Status: ACTIVE | Noted: 2018-01-23

## 2018-04-03 PROBLEM — M43.10 SPONDYLOLISTHESIS, ACQUIRED: Status: ACTIVE | Noted: 2018-01-09

## 2018-04-03 PROBLEM — Z90.79 S/P PROSTATECTOMY: Status: ACTIVE | Noted: 2017-08-22

## 2018-04-03 PROBLEM — M47.816 LUMBAR SPONDYLOSIS: Status: ACTIVE | Noted: 2017-12-28

## 2018-04-03 PROCEDURE — 3074F SYST BP LT 130 MM HG: CPT | Performed by: FAMILY MEDICINE

## 2018-04-03 PROCEDURE — 99214 OFFICE O/P EST MOD 30 MIN: CPT | Performed by: FAMILY MEDICINE

## 2018-04-03 PROCEDURE — 3078F DIAST BP <80 MM HG: CPT | Performed by: FAMILY MEDICINE

## 2018-04-04 ENCOUNTER — TELEPHONE (OUTPATIENT)
Dept: FAMILY MEDICINE CLINIC | Facility: CLINIC | Age: 58
End: 2018-04-04

## 2018-04-04 NOTE — PROGRESS NOTES
Assessment/Plan:      Diagnoses and all orders for this visit:    Obstructive sleep apnea syndrome    Hyperlipidemia, unspecified hyperlipidemia type  -     Lipid panel; Future  -     Lipid panel    Benign essential hypertension    Screening for iron deficiency anemia  -     CBC and differential; Future  -     CBC and differential    Screening for diabetes mellitus  -     Comprehensive metabolic panel; Future  -     Comprehensive metabolic panel    Need for hepatitis C screening test  -     Hepatitis C antibody; Future  -     Hepatitis C antibody    Screening for thyroid disorder  -     TSH, 3rd generation with T4 reflex; Future  -     TSH, 3rd generation with T4 reflex        likely sleep apnea:  Daytime fatigue, witnessed apnea Will need home sleep study  Routine screening blood work  Hypertension:  Controlled  Subjective:     Patient ID: Easton Ayala is a 59 y o  male  Witnessed apnea by wife, afternoon sedation  Does not feel rested in am  Wakes up at night but can go back to sleep  Falls asleep ok  Goes to bed 9-9:30pm to 4:30am    Sleeps in til 6 on Sunday  Mouth dry  Dream but no nightmares  Trouble losing weight  Brother with sleep apnea  [address]   Hypertension and seizure- 1986  Large neck circumference            Review of Systems   Constitutional: Positive for fatigue  Negative for fever  HENT: Negative  Eyes: Negative  Respiratory: Negative  Negative for cough  Cardiovascular: Negative  Gastrointestinal: Negative  Endocrine: Negative  Genitourinary: Negative  Musculoskeletal: Negative  Skin: Negative  Allergic/Immunologic: Negative  Neurological: Negative  Psychiatric/Behavioral: Positive for sleep disturbance           The following portions of the patient's history were reviewed and updated as appropriate: allergies, current medications, past family history, past medical history, past social history, past surgical history and problem list     Objective:  Vitals:    04/03/18 1505   BP: 128/70   Pulse: 92   Temp: 97 7 °F (36 5 °C)   SpO2: 95%   Weight: 109 kg (240 lb)      Physical Exam   Constitutional: He is oriented to person, place, and time  He appears well-developed and well-nourished  HENT:   Head: Normocephalic and atraumatic  Crowded oral airway   Cardiovascular: Normal rate, regular rhythm and normal heart sounds  Pulmonary/Chest: Effort normal and breath sounds normal    Abdominal: Soft  Bowel sounds are normal    Musculoskeletal:   Large neck circumference   Neurological: He is alert and oriented to person, place, and time  Skin: Skin is warm and dry  Psychiatric: He has a normal mood and affect  His behavior is normal  Judgment and thought content normal    Nursing note and vitals reviewed

## 2018-04-12 ENCOUNTER — TELEPHONE (OUTPATIENT)
Dept: FAMILY MEDICINE CLINIC | Facility: CLINIC | Age: 58
End: 2018-04-12

## 2018-04-13 DIAGNOSIS — R06.81 WITNESSED EPISODE OF APNEA: ICD-10-CM

## 2018-04-13 DIAGNOSIS — I10 BENIGN ESSENTIAL HYPERTENSION: Primary | ICD-10-CM

## 2018-04-13 DIAGNOSIS — G40.909 NONINTRACTABLE EPILEPSY WITHOUT STATUS EPILEPTICUS, UNSPECIFIED EPILEPSY TYPE (HCC): ICD-10-CM

## 2018-04-13 DIAGNOSIS — IMO0001 CLASS 2 OBESITY WITH SERIOUS COMORBIDITY AND BODY MASS INDEX (BMI) OF 37.0 TO 37.9 IN ADULT, UNSPECIFIED OBESITY TYPE: ICD-10-CM

## 2018-04-13 PROBLEM — E66.812 CLASS 2 OBESITY IN ADULT: Status: ACTIVE | Noted: 2018-04-13

## 2018-04-13 PROBLEM — E66.9 CLASS 2 OBESITY IN ADULT: Status: ACTIVE | Noted: 2018-04-13

## 2018-04-13 PROBLEM — R53.83 OTHER FATIGUE: Status: ACTIVE | Noted: 2018-04-13

## 2018-04-13 NOTE — TELEPHONE ENCOUNTER
Informed pt    Order was faxed also to [facility]

## 2018-04-13 NOTE — TELEPHONE ENCOUNTER
HOME SLEEP STUDIES DO NOT REQUIRE PRIOR AUTH    JUST NEED TO SEND ORDER TO [facility] Keeley Sharma #[phone]

## 2018-04-23 ENCOUNTER — OFFICE VISIT (OUTPATIENT)
Dept: PAIN MEDICINE | Facility: CLINIC | Age: 58
End: 2018-04-23
Payer: COMMERCIAL

## 2018-04-23 VITALS
DIASTOLIC BLOOD PRESSURE: 74 MMHG | TEMPERATURE: 97.9 F | SYSTOLIC BLOOD PRESSURE: 126 MMHG | BODY MASS INDEX: 37.98 KG/M2 | HEIGHT: 67 IN | HEART RATE: 68 BPM | WEIGHT: 242 LBS

## 2018-04-23 DIAGNOSIS — M47.816 LUMBAR SPONDYLOSIS: ICD-10-CM

## 2018-04-23 DIAGNOSIS — G89.29 CHRONIC BILATERAL LOW BACK PAIN WITHOUT SCIATICA: Primary | ICD-10-CM

## 2018-04-23 DIAGNOSIS — M48.07 LUMBOSACRAL SPINAL STENOSIS: ICD-10-CM

## 2018-04-23 DIAGNOSIS — M51.17 INTERVERTEBRAL DISC DISORDERS WITH RADICULOPATHY, LUMBOSACRAL REGION: ICD-10-CM

## 2018-04-23 DIAGNOSIS — M43.10 SPONDYLOLISTHESIS, ACQUIRED: ICD-10-CM

## 2018-04-23 DIAGNOSIS — M54.50 CHRONIC BILATERAL LOW BACK PAIN WITHOUT SCIATICA: Primary | ICD-10-CM

## 2018-04-23 DIAGNOSIS — M48.061 LUMBAR FORAMINAL STENOSIS: ICD-10-CM

## 2018-04-23 DIAGNOSIS — G89.4 CHRONIC PAIN SYNDROME: ICD-10-CM

## 2018-04-23 PROCEDURE — 99214 OFFICE O/P EST MOD 30 MIN: CPT | Performed by: NURSE PRACTITIONER

## 2018-04-23 NOTE — H&P
Assessment:  1  Chronic bilateral low back pain without sciatica    2  Lumbar spondylosis    3  Intervertebral disc disorders with radiculopathy, lumbosacral region    4  Lumbar foraminal stenosis    5  Spondylolisthesis, acquired    6  Lumbosacral spinal stenosis    7  Chronic pain syndrome        Plan:  While the patient was in the office today, I did have a thorough conversation with the patient regarding his medication regimen and treatment plan  I discussed with the patient that at this point time 1 option would be to consider a repeat interlaminar lumbar epidural steroid injection with Dr Clementine Patrick to see if he would note even more sustainable and longer lasting relief  However, the patient does understand that after this injection we would need to hold off any repeat injections for at least 3-4 months, if not longer  The patient was agreeable and verbalized an understanding  Complete risks and benefits including bleeding, infection, tissue reaction, nerve injury and allergic reaction were discussed  The approach was demonstrated using models and literature was provided  Verbal and written consent was obtained  I also discussed with the patient that if the repeat injection does not provide the longer lasting relief he is hoping for, I feel he may be a candidate to consider a lumbar medial branch block  Based on the patient's symptoms examination, I suspect that the pain is being generated by the lumbar facet joints  The facet joints are only one of many possible low-back pain generators  Unfortunately, studies have demonstrated that history and examination alone are unreliable  We will schedule the patient for diagnostic lumbar medial branch blockade using the double block paradigm  If the patient receives significant relief of appropriate duration with lidocaine 2%, we will confirm with Marcaine 0 75%   If the patient demonstrates appropriate response to medial branch blockade we will schedule for radiofrequency ablation to provide long-term relief  Complete risks and benefits including bleeding, infection, tissue reaction, nerve injury and allergic reaction were discussed  However, at this point time he want to take the brochure is home to review as he is a little hesitant because some of his history to considering the radiofrequency ablation procedure, but is willing to at least think about it, especially if the repeat epidural steroid injection does not provide the long-lasting relief he was hoping for  I also discussed with the patient at this point time I am not sure that ache surgical consultation would be appropriate and the patient was in agreement  With regards to medication options, explained to the patient that with his renal function and other comorbidities, I do not feel that medications would be a good option to manage his pain  The patient was agreeable and verbalized an understanding  I discussed with the patient that at this point time he can followup with our office on an as-needed basis  I did review the patient that if his pain symptoms should change, worsen, and/or if he would experience any new symptoms he would like to be evaluated for, he should give our office a call  The patient was agreeable and verbalized an understanding  History of Present Illness: The patient is a 59 y o  male last seen on 2/2/18 MERLE AG who presents for a follow up office visit in regards to chronic pain secondary to lumbar spondylosis with stenosis  The patient currently reports that since his last office visit he does feel that the 2nd epidural steroid injection provided 100% relief of his pain symptoms for almost a month and at this point his pain symptoms have started to return with activity as he is still noting at least 50-60% relief overall and presents today to discuss his treatment plan options    He reports he continues with the low back pain and denies any lower extremity radicular symptoms or weakness  I have personally reviewed and/or updated the patient's past medical history, past surgical history, family history, social history, current medications, allergies, and vital signs today  Review of Systems:    Review of Systems   Respiratory: Positive for shortness of breath  Cardiovascular: Negative for chest pain  Gastrointestinal: Negative for constipation, diarrhea, nausea and vomiting  Musculoskeletal: Positive for gait problem  Negative for arthralgias, joint swelling (joint stiffness) and myalgias  Skin: Negative for rash  Neurological: Negative for dizziness, seizures and weakness  All other systems reviewed and are negative  Past Medical History:   Diagnosis Date    Accelerated essential hypertension     Acquired spondylolisthesis     Atypical chest pain     CA of prostate (HCC)     De Quervain's tenosynovitis     Epileptic seizure ([facility] [address]     Esophageal reflux     Fatigue     Hematuria     Hyperglycemia     Hyperlipidemia     Impaired fasting glucose     Lightheadedness     Lower back pain     Lumbar foraminal stenosis     Lumbar spondylosis     Obesity     Plantar fasciitis     Polyp of sigmoid colon     Renal cell carcinoma, right (HCC)     Right kidney mass     Sleep apnea     Spinal stenosis        Past Surgical History:   Procedure Laterality Date    CHOLECYSTECTOMY      HERNIA REPAIR         Family History   Problem Relation Age of Onset    Hypertension Mother     Diabetes Father     Tremor Father     Hypertension Brother        Social History     Occupational History    Not on file  Social History Main Topics    Smoking status: Never Smoker    Smokeless tobacco: Never Used    Alcohol use No    Drug use: No    Sexual activity: Not on file         Current Outpatient Prescriptions:     phenytoin (DILANTIN) 100 mg ER capsule, TAKE 1 CAPSULE 3 TIMES DAILY  , Disp: 90 capsule, Rfl: 3    tadalafil (CIALIS) 20 MG tablet, Take 20 mg by mouth, Disp: , Rfl:     valsartan-hydrochlorothiazide (DIOVAN-HCT) 160-12 5 MG per tablet, TAKE 1 TABLET BY MOUTH DAILY, Disp: 30 tablet, Rfl: 5    Allergies   Allergen Reactions    Ciprofloxacin Diarrhea     Other reaction(s): RAPID IRREGULAR HEART BEAT    Fluticasone-Salmeterol GI Intolerance    Other        Physical Exam:    There were no vitals taken for this visit  Constitutional:normal, well developed, well nourished, alert, in no distress and non-toxic and no overt pain behavior  and overweight  Eyes:anicteric  HEENT:grossly intact  Neck:supple, symmetric, trachea midline and no masses   Pulmonary:even and unlabored  Cardiovascular:No edema or pitting edema present  Skin:Normal without rashes or lesions and well hydrated  Psychiatric:Mood and affect appropriate  Neurologic:Cranial Nerves II-XII grossly intact  Musculoskeletal:normal      Imaging  No orders to display         No orders of the defined types were placed in this encounter

## 2018-04-23 NOTE — PROGRESS NOTES
Assessment:  1  Chronic bilateral low back pain without sciatica    2  Lumbar spondylosis    3  Intervertebral disc disorders with radiculopathy, lumbosacral region    4  Lumbar foraminal stenosis    5  Spondylolisthesis, acquired    6  Lumbosacral spinal stenosis    7  Chronic pain syndrome        Plan:  While the patient was in the office today, I did have a thorough conversation with the patient regarding his medication regimen and treatment plan  I discussed with the patient that at this point time 1 option would be to consider a repeat interlaminar lumbar epidural steroid injection with Dr Donnell Alberto to see if he would note even more sustainable and longer lasting relief  However, the patient does understand that after this injection we would need to hold off any repeat injections for at least 3-4 months, if not longer  The patient was agreeable and verbalized an understanding  Complete risks and benefits including bleeding, infection, tissue reaction, nerve injury and allergic reaction were discussed  The approach was demonstrated using models and literature was provided  Verbal and written consent was obtained  I also discussed with the patient that if the repeat injection does not provide the longer lasting relief he is hoping for, I feel he may be a candidate to consider a lumbar medial branch block  Based on the patient's symptoms examination, I suspect that the pain is being generated by the lumbar facet joints  The facet joints are only one of many possible low-back pain generators  Unfortunately, studies have demonstrated that history and examination alone are unreliable  We will schedule the patient for diagnostic lumbar medial branch blockade using the double block paradigm  If the patient receives significant relief of appropriate duration with lidocaine 2%, we will confirm with Marcaine 0 75%   If the patient demonstrates appropriate response to medial branch blockade we will schedule for radiofrequency ablation to provide long-term relief  Complete risks and benefits including bleeding, infection, tissue reaction, nerve injury and allergic reaction were discussed  However, at this point time he want to take the brochure is home to review as he is a little hesitant because some of his history to considering the radiofrequency ablation procedure, but is willing to at least think about it, especially if the repeat epidural steroid injection does not provide the long-lasting relief he was hoping for  I also discussed with the patient at this point time I am not sure that ache surgical consultation would be appropriate and the patient was in agreement  With regards to medication options, explained to the patient that with his renal function and other comorbidities, I do not feel that medications would be a good option to manage his pain  The patient was agreeable and verbalized an understanding  I discussed with the patient that at this point time he can followup with our office on an as-needed basis  I did review the patient that if his pain symptoms should change, worsen, and/or if he would experience any new symptoms he would like to be evaluated for, he should give our office a call  The patient was agreeable and verbalized an understanding  History of Present Illness: The patient is a 59 y o  male last seen on 2/2/18 MERLE AG who presents for a follow up office visit in regards to chronic pain secondary to lumbar spondylosis with stenosis  The patient currently reports that since his last office visit he does feel that the 2nd epidural steroid injection provided 100% relief of his pain symptoms for almost a month and at this point his pain symptoms have started to return with activity as he is still noting at least 50-60% relief overall and presents today to discuss his treatment plan options    He reports he continues with the low back pain and denies any lower extremity radicular symptoms or weakness  I have personally reviewed and/or updated the patient's past medical history, past surgical history, family history, social history, current medications, allergies, and vital signs today  Review of Systems:    Review of Systems   Respiratory: Positive for shortness of breath  Cardiovascular: Negative for chest pain  Gastrointestinal: Negative for constipation, diarrhea, nausea and vomiting  Musculoskeletal: Positive for gait problem  Negative for arthralgias, joint swelling (joint stiffness) and myalgias  Skin: Negative for rash  Neurological: Negative for dizziness, seizures and weakness  All other systems reviewed and are negative  Past Medical History:   Diagnosis Date    Accelerated essential hypertension     Acquired spondylolisthesis     Atypical chest pain     CA of prostate (HCC)     De Quervain's tenosynovitis     Epileptic seizure ([facility] [address]     Esophageal reflux     Fatigue     Hematuria     Hyperglycemia     Hyperlipidemia     Impaired fasting glucose     Lightheadedness     Lower back pain     Lumbar foraminal stenosis     Lumbar spondylosis     Obesity     Plantar fasciitis     Polyp of sigmoid colon     Renal cell carcinoma, right (HCC)     Right kidney mass     Sleep apnea     Spinal stenosis        Past Surgical History:   Procedure Laterality Date    CHOLECYSTECTOMY      HERNIA REPAIR         Family History   Problem Relation Age of Onset    Hypertension Mother     Diabetes Father     Tremor Father     Hypertension Brother        Social History     Occupational History    Not on file  Social History Main Topics    Smoking status: Never Smoker    Smokeless tobacco: Never Used    Alcohol use No    Drug use: No    Sexual activity: Not on file         Current Outpatient Prescriptions:     phenytoin (DILANTIN) 100 mg ER capsule, TAKE 1 CAPSULE 3 TIMES DAILY  , Disp: 90 capsule, Rfl: 3    tadalafil (CIALIS) 20 MG tablet, Take 20 mg by mouth, Disp: , Rfl:     valsartan-hydrochlorothiazide (DIOVAN-HCT) 160-12 5 MG per tablet, TAKE 1 TABLET BY MOUTH DAILY, Disp: 30 tablet, Rfl: 5    Allergies   Allergen Reactions    Ciprofloxacin Diarrhea     Other reaction(s): RAPID IRREGULAR HEART BEAT    Fluticasone-Salmeterol GI Intolerance    Other        Physical Exam:    There were no vitals taken for this visit  Constitutional:normal, well developed, well nourished, alert, in no distress and non-toxic and no overt pain behavior  and overweight  Eyes:anicteric  HEENT:grossly intact  Neck:supple, symmetric, trachea midline and no masses   Pulmonary:even and unlabored  Cardiovascular:No edema or pitting edema present  Skin:Normal without rashes or lesions and well hydrated  Psychiatric:Mood and affect appropriate  Neurologic:Cranial Nerves II-XII grossly intact  Musculoskeletal:normal      Imaging  No orders to display         No orders of the defined types were placed in this encounter

## 2018-04-30 ENCOUNTER — HOSPITAL ENCOUNTER (OUTPATIENT)
Dept: RADIOLOGY | Facility: CLINIC | Age: 58
Discharge: HOME | End: 2018-04-30
Payer: COMMERCIAL

## 2018-04-30 VITALS
TEMPERATURE: 98.7 F | OXYGEN SATURATION: 94 % | HEART RATE: 94 BPM | DIASTOLIC BLOOD PRESSURE: 79 MMHG | SYSTOLIC BLOOD PRESSURE: 114 MMHG | RESPIRATION RATE: 20 BRPM

## 2018-04-30 DIAGNOSIS — M48.07 LUMBOSACRAL SPINAL STENOSIS: ICD-10-CM

## 2018-04-30 DIAGNOSIS — G89.29 CHRONIC BILATERAL LOW BACK PAIN WITHOUT SCIATICA: ICD-10-CM

## 2018-04-30 DIAGNOSIS — M51.17 INTERVERTEBRAL DISC DISORDERS WITH RADICULOPATHY, LUMBOSACRAL REGION: ICD-10-CM

## 2018-04-30 DIAGNOSIS — M54.50 CHRONIC BILATERAL LOW BACK PAIN WITHOUT SCIATICA: ICD-10-CM

## 2018-04-30 DIAGNOSIS — M48.061 LUMBAR FORAMINAL STENOSIS: ICD-10-CM

## 2018-04-30 PROCEDURE — 62323 NJX INTERLAMINAR LMBR/SAC: CPT | Performed by: ANESTHESIOLOGY

## 2018-04-30 RX ORDER — METHYLPREDNISOLONE ACETATE 80 MG/ML
160 INJECTION, SUSPENSION INTRA-ARTICULAR; INTRALESIONAL; INTRAMUSCULAR; PARENTERAL; SOFT TISSUE ONCE
Status: COMPLETED | OUTPATIENT
Start: 2018-04-30 | End: 2018-04-30

## 2018-04-30 RX ORDER — LIDOCAINE HYDROCHLORIDE 10 MG/ML
5 INJECTION, SOLUTION EPIDURAL; INFILTRATION; INTRACAUDAL; PERINEURAL ONCE
Status: COMPLETED | OUTPATIENT
Start: 2018-04-30 | End: 2018-04-30

## 2018-04-30 RX ADMIN — LIDOCAINE HYDROCHLORIDE 3 ML: 10 INJECTION, SOLUTION EPIDURAL; INFILTRATION; INTRACAUDAL; PERINEURAL at 15:20

## 2018-04-30 RX ADMIN — METHYLPREDNISOLONE ACETATE 160 MG: 80 INJECTION, SUSPENSION INTRA-ARTICULAR; INTRALESIONAL; INTRAMUSCULAR; SOFT TISSUE at 15:20

## 2018-04-30 RX ADMIN — IOHEXOL 1 ML: 300 INJECTION, SOLUTION INTRAVENOUS at 15:30

## 2018-04-30 NOTE — DISCHARGE INSTRUCTIONS
Epidural Steroid Injection   WHAT YOU NEED TO KNOW:   An epidural steroid injection (ABHI) is a procedure to inject steroid medicine into the epidural space  The epidural space is between your spinal cord and vertebrae  Steroids reduce inflammation and fluid buildup in your spine that may be causing pain  You may be given pain medicine along with the steroids  ACTIVITY  · Do not drive or operate machinery today  · No strenuous activity today - bending, lifting, etc   · You may resume normal activites starting tomorrow - start slowly and as tolerated  · You may shower today, but no tub baths or hot tubs  · You may have numbness for several hours from the local anesthetic  Please use caution and common sense, especially with weight-bearing activities  CARE OF THE INJECTION SITE  · If you have soreness or pain, apply ice to the area today (20 minutes on/20 minutes off)  · Starting tomorrow, you may use warm, moist heat or ice if needed  · You may have an increase or change in your discomfort for 36-48 hours after your treatment  · Apply ice and continue with any pain medication you have been prescribed  · Notify [facility] if you have any of the following: redness, drainage, swelling, headache, stiff neck or fever above 100°F     SPECIAL INSTRUCTIONS  · Our office will contact you in approximately 7 days for a progress report  MEDICATIONS  · Continue to take all routine medications  · Our office may have instructed you to hold some medications  If you have a problem specifically related to your procedure, please call our office at [phone]  Problems not related to your procedure should be directed to your primary care physician

## 2018-04-30 NOTE — H&P
History of Present Illness: The patient is a 59 y o  male who presents with complaints of low back page    Patient Active Problem List   Diagnosis    Spondylolisthesis, acquired    Benign essential hypertension    [facility]    De Quervain's tenosynovitis    Esophageal reflux    H/O unilateral nephrectomy    Hyperlipidemia    Impaired fasting glucose    Intervertebral disc disorders with radiculopathy, lumbosacral region    Lower back pain    Lumbar foraminal stenosis    Lumbar spondylosis    Lumbosacral spinal stenosis    Obesity    Plantar fasciitis    Polyp of sigmoid colon    Prostate cancer ([facility] )    Renal cell carcinoma, right ([facility]    S/P prostatectomy    Seizure, epileptic ([place] [address] )    Sleep apnea    Spinal stenosis    Tendonitis    Witnessed episode of apnea    Other fatigue    Class 2 obesity in adult    Chronic pain syndrome       Past Medical History:   Diagnosis Date    Accelerated essential hypertension     Acquired spondylolisthesis     Atypical chest pain     CA of prostate [facility]     De Quervain's tenosynovitis     Epileptic seizure ([place] [address] )     Esophageal reflux     Fatigue     Hematuria     Hyperglycemia     Hyperlipidemia     Impaired fasting glucose     Lightheadedness     Lower back pain     Lumbar foraminal stenosis     Lumbar spondylosis     Obesity     Plantar fasciitis     Polyp of sigmoid colon     Renal cell carcinoma, right (HCC)     Right kidney mass     Sleep apnea     Spinal stenosis        Past Surgical History:   Procedure Laterality Date    CHOLECYSTECTOMY      HERNIA REPAIR           Current Outpatient Prescriptions:     phenytoin (DILANTIN) 100 mg ER capsule, TAKE 1 CAPSULE 3 TIMES DAILY  , Disp: 90 capsule, Rfl: 3    valsartan-hydrochlorothiazide (DIOVAN-HCT) 160-12 5 MG per tablet, TAKE 1 TABLET BY MOUTH DAILY, Disp: 30 tablet, Rfl: 5    Allergies   Allergen Reactions    Ciprofloxacin Diarrhea     Other reaction(s): RAPID IRREGULAR HEART BEAT    Fluticasone-Salmeterol GI Intolerance    Other        Physical Exam:   Vitals:    04/30/18 1509   BP: 128/85   Pulse: 87   Resp: 20   Temp: 98 7 °F (37 1 °C)   SpO2: 99%     General: Awake, Alert, Oriented x 3, Mood and affect appropriate  Respiratory: Respirations even and unlabored  Cardiovascular: Peripheral pulses intact; no edema  Musculoskeletal Exam:  Decreased range of motion lumbar spine    ASA Score: II    Assessment:   1  Chronic bilateral low back pain without sciatica    2  Intervertebral disc disorders with radiculopathy, lumbosacral region    3  Lumbar foraminal stenosis    4   Lumbosacral spinal stenosis        Plan: MERLE

## 2018-05-14 ENCOUNTER — HOSPITAL ENCOUNTER (OUTPATIENT)
Dept: SLEEP CENTER | Facility: CLINIC | Age: 58
Discharge: HOME | End: 2018-05-14
Payer: COMMERCIAL

## 2018-05-14 DIAGNOSIS — G40.909 NONINTRACTABLE EPILEPSY WITHOUT STATUS EPILEPTICUS, UNSPECIFIED EPILEPSY TYPE (HCC): ICD-10-CM

## 2018-05-14 DIAGNOSIS — I10 BENIGN ESSENTIAL HYPERTENSION: ICD-10-CM

## 2018-05-14 DIAGNOSIS — R06.81 WITNESSED EPISODE OF APNEA: ICD-10-CM

## 2018-05-14 DIAGNOSIS — IMO0001 CLASS 2 OBESITY WITH SERIOUS COMORBIDITY AND BODY MASS INDEX (BMI) OF 37.0 TO 37.9 IN ADULT, UNSPECIFIED OBESITY TYPE: ICD-10-CM

## 2018-05-14 PROCEDURE — G0399 HOME SLEEP TEST/TYPE 3 PORTA: HCPCS

## 2018-05-14 PROCEDURE — G0399 HOME SLEEP TEST/TYPE 3 PORTA: HCPCS | Performed by: PSYCHIATRY & NEUROLOGY

## 2018-05-21 ENCOUNTER — TELEPHONE (OUTPATIENT)
Dept: SLEEP CENTER | Facility: CLINIC | Age: 58
End: 2018-05-21

## 2018-05-21 PROBLEM — G47.30 SEVERE SLEEP APNEA: Status: ACTIVE | Noted: 2018-05-21

## 2018-05-25 ENCOUNTER — TRANSCRIBE ORDERS (OUTPATIENT)
Dept: ADMINISTRATIVE | Facility: HOSPITAL | Age: 58
End: 2018-05-25

## 2018-05-25 DIAGNOSIS — G47.30 SLEEP APNEA, UNSPECIFIED TYPE: Primary | ICD-10-CM

## 2018-05-29 ENCOUNTER — TRANSCRIBE ORDERS (OUTPATIENT)
Dept: SLEEP CENTER | Facility: CLINIC | Age: 58
End: 2018-05-29

## 2018-05-29 DIAGNOSIS — G47.30 SLEEP APNEA: Primary | ICD-10-CM

## 2018-05-31 ENCOUNTER — OFFICE VISIT (OUTPATIENT)
Dept: SLEEP CENTER | Facility: CLINIC | Age: 58
End: 2018-05-31
Payer: COMMERCIAL

## 2018-05-31 VITALS
BODY MASS INDEX: 38.01 KG/M2 | DIASTOLIC BLOOD PRESSURE: 78 MMHG | HEIGHT: 67 IN | WEIGHT: 242.2 LBS | SYSTOLIC BLOOD PRESSURE: 110 MMHG | HEART RATE: 68 BPM

## 2018-05-31 DIAGNOSIS — G40.509 NONINTRACTABLE EPILEPSY DUE TO EXTERNAL CAUSES, WITHOUT STATUS EPILEPTICUS (HCC): ICD-10-CM

## 2018-05-31 DIAGNOSIS — E66.09 CLASS 2 OBESITY DUE TO EXCESS CALORIES WITH BODY MASS INDEX (BMI) OF 37.0 TO 37.9 IN ADULT, UNSPECIFIED WHETHER SERIOUS COMORBIDITY PRESENT: ICD-10-CM

## 2018-05-31 DIAGNOSIS — G47.33 OBSTRUCTIVE SLEEP APNEA: Primary | ICD-10-CM

## 2018-05-31 DIAGNOSIS — G47.30 SLEEP APNEA: ICD-10-CM

## 2018-05-31 PROCEDURE — 99205 OFFICE O/P NEW HI 60 MIN: CPT | Performed by: PSYCHIATRY & NEUROLOGY

## 2018-05-31 NOTE — PROGRESS NOTES
Consultation - Fang, 1960, MRN: [id number]    5/31/2018        Reason for Consult / Principal Problem:    Loud snoring  Possible episodes of apnea     Subjective:     HPI: Stefania Sue is a 59y o  year old male  His wife has reported very loud snoring and feels that he occasionally has difficulty breathing during sleep  The patient thinks this probably been occurring over the last several years  He is not aware of his own snoring  He occasionally awakens from sleep feeling as though he is unable breathe  Review of Systems      Genitourinary need to urinate more than twice a night and difficulty with erection   Cardiology none   Gastrointestinal frequent heartburn/acid reflux   Neurology need to move extremities, muscle weakness and difficulty with memory   Constitutional fatigue   Integumentary none   Psychiatry none   Musculoskeletal joint pain, muscle aches, back pain and leg cramps   Pulmonary shortness of breath with activity and snoring   ENT none   Endocrine frequent urination   Hematological none       Employment:  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 [employer]    Sleep Schedule:       Bedtime:  TYPICAL BEDTIME IS APPROXIMATELY 9:30 P  M  Latency:  Very brief      Wakeup time:  4:30 a m  on work days    Awakenings:       Frequency:  2-4 times each night      Causes: To urinate or with difficulty breathing, sometimes disturbed by wife      Duration:  Brief    Daytime Sleepiness / Inappropriate Sleep:       Most severe:  Approximately 10:00 a m  and 2:00 p m         Naps :  Almost every day after work      Time:  Usually late afternoon but may be much later      Duration:  30-45 minutes, these are non refreshing quality       Inappropriate drowsiness / sleep:  Occasionally when driving especially in the afternoon and when sedentary especially all watching television    Snoring:  His wife describes snoring as very loud    Apnea:  His wife also describes episodes where he seems stop breathing    Change in Weight:  Approximately 10-20 lb gain over the past 2-3 years    Restless Leg Syndrome:  Some clinical symptoms consistent with this diagnosis typically when getting into bed he feels the urge to move his lower extremities  Frequency is approximately twice a month  Other Complaints:  Sleep is non restorative     Social History:      Caffeine:  Approximately 3-4 16 oz cans of ice tea      Tobacco:   reports that he has never smoked  He has never used smokeless tobacco        Alcohol:   reports that he does not drink alcohol  Drugs:   reports that he does not use drugs  The review of systems and following portions of the patient's history were reviewed and updated as appropriate: allergies, current medications, past family history, past medical history, past social history, past surgical history and problem list       Objective:       Vitals:    05/31/18 1500   BP: 110/78   Pulse: 68   Weight: 110 kg (242 lb 3 2 oz)   Height: 5' 7" (1 702 m)     Body mass index is 37 93 kg/m²  Neck Circumference: 41 (cm)  [place] Sleepiness Scale: Total score: 14      Current Outpatient Prescriptions:     phenytoin (DILANTIN) 100 mg ER capsule, TAKE 1 CAPSULE 3 TIMES DAILY  , Disp: 90 capsule, Rfl: 3    valsartan-hydrochlorothiazide (DIOVAN-HCT) 160-12 5 MG per tablet, TAKE 1 TABLET BY MOUTH DAILY, Disp: 30 tablet, Rfl: 5    Physical Exam  General Appearance:   Alert, cooperative, no distress, appears stated age, obese     Head:   Normocephalic, without obvious abnormality, atraumatic     Eyes:   PERRL, conjunctiva/corneas clear, EOM's intact          Nose:  Nares normal, septum midline, mucosa normal, no drainage or sinus tenderness           Throat:  Lips, teeth and gums normal; tongue normal size and  shape and midline in position; mucosa bilaterally redundant, uvula not well seen, tonsils not visible, Mallampati class 4      Neck:  Supple, symmetrical, trachea midline, no adenopathy; Thyroid: No enlargement, tenderness or nodules; no carotid bruit or JVD     Lungs:      Clear to auscultation bilaterally, respirations unlabored     Heart:   Regular rate and rhythm, S1 and S2 normal, no murmur, rub or gallop       Extremities:  Extremities normal, atraumatic, no cyanosis or edema     Pulses:  2+ and symmetric all extremities     Skin:  Skin color, texture, turgor normal, no rashes or lesions       Neurologic:  CNII-XII intact  Normal strength, sensation throughout     Sleep Study Results:  Home sleep study was completed on 05/14/2018  This test shows total of 125 apneas and 300 hypopneas  The AHI is 61 1 events per hour over the entire study  Lowest measured oxygen concentration is 74%  ASSESSMENT / PLAN     1  Obstructive sleep apnea     2  Sleep apnea  Ambulatory referral to Neurology   3  Nonintractable epilepsy due to external causes, without status epilepticus ([facility] )     4  Class 2 obesity due to excess calories with body mass index (BMI) of 37 0 to 37 9 in adult, unspecified whether serious comorbidity present           Counseling / Coordination of Care  Total clinic time spent today 45 minutes  Greater than 50% of total time was spent with the patient and / or family counseling and / or coordination of care  A description of the counseling / coordination of care:     diagnostic results, instructions for management, risk factor reductions, prognosis, patient and family education, impressions, risks and benefits of treatment options and importance of compliance with treatment    The following instructions have been given to the patient today:    Patient Instructions   1  Return for titration of nasal CPAP  2  Follow-up after 4-8 weeks for download of compliance data  3  Adjust CPAP based on that information  4  Adjust pressure and equipment for greatest degree of comfort  5    Contact the [facility] with any questions or problems prior to the next visit        Silas Caruso

## 2018-05-31 NOTE — PATIENT INSTRUCTIONS
1   Return for titration of nasal CPAP  2  Follow-up after 4-8 weeks for download of compliance data  3  Adjust CPAP based on that information  4  Adjust pressure and equipment for greatest degree of comfort  5    Contact the [facility] with any questions or problems prior to the next visit

## 2018-06-15 ENCOUNTER — HOSPITAL ENCOUNTER (OUTPATIENT)
Dept: SLEEP CENTER | Facility: CLINIC | Age: 58
Discharge: HOME | End: 2018-06-15
Payer: COMMERCIAL

## 2018-06-15 DIAGNOSIS — G47.33 OBSTRUCTIVE SLEEP APNEA: ICD-10-CM

## 2018-06-15 PROCEDURE — 95811 POLYSOM 6/>YRS CPAP 4/> PARM: CPT

## 2018-06-15 PROCEDURE — 95811 POLYSOM 6/>YRS CPAP 4/> PARM: CPT | Performed by: PSYCHIATRY & NEUROLOGY

## 2018-06-16 NOTE — PROGRESS NOTES
Sleep Study Documentation    Pre-Sleep Study       Sleep testing procedure explained to patient:YES    Patient napped prior to study:NO    Caffeine:Dayshift worker after 12PM   Caffeine use:YES- ice tea  12 to 26 ounces    Alcohol:Dayshift workers after 5PM: Alcohol use:NO    Typical day for patient:YES       Study Documentation  Treatment   Optimal PAP pressure: 10cm  Leak:Small  Snore:Eliminated  REM Obtained:yes  Supplemental O2: no    Minimum SaO2 90  Baseline SaO2 95  PAP mask tried (list all) Resmed Airfit P10, [employer] Eson  PAP mask choice (final) [employer] Eson  PAP mask type:nasal  PAP pressure at which snoring was eliminated   Minimum SaO2 at final PAP pressure 90  Mode of Therapy:CPAP  ETCO2:No  CPAP changed to BiPAP:No    EKG abnormalities: no     EEG abnormalities: no    Study Terminated:no          Post-Sleep Study    Medication used at bedtime or during sleep study:NO    Patient reports time it took to fall asleep:greater than 60 minutes    Patient reports waking up during study:3 or more times  Patient reports returning to sleep in 10 to 30 minutes  Patient reports sleeping 4 to 6 hours without dreaming  Patient reports sleep during study:better than usual    Patient rated sleepiness: Not sleepy or tired    PAP treatment:yes: Post PAP treatment patient reports feeling better and  would wear PAP mask at home

## 2018-06-20 ENCOUNTER — TELEPHONE (OUTPATIENT)
Dept: SLEEP CENTER | Facility: CLINIC | Age: 58
End: 2018-06-20

## 2018-06-20 DIAGNOSIS — Z99.89 OBSTRUCTIVE SLEEP APNEA ON CPAP: Primary | ICD-10-CM

## 2018-06-20 DIAGNOSIS — G47.33 OBSTRUCTIVE SLEEP APNEA ON CPAP: Primary | ICD-10-CM

## 2018-06-20 NOTE — TELEPHONE ENCOUNTER
Spoke with patient regarding follow up appointment 6/29  Instructed to bring mask    Rx and data to [facility]

## 2018-06-29 ENCOUNTER — OFFICE VISIT (OUTPATIENT)
Dept: SLEEP CENTER | Facility: CLINIC | Age: 58
End: 2018-06-29
Payer: COMMERCIAL

## 2018-06-29 ENCOUNTER — HOSPITAL ENCOUNTER (OUTPATIENT)
Dept: SLEEP CENTER | Facility: CLINIC | Age: 58
Discharge: HOME | End: 2018-06-29

## 2018-06-29 VITALS
HEART RATE: 78 BPM | WEIGHT: 242.7 LBS | HEIGHT: 67 IN | DIASTOLIC BLOOD PRESSURE: 84 MMHG | BODY MASS INDEX: 38.09 KG/M2 | SYSTOLIC BLOOD PRESSURE: 132 MMHG

## 2018-06-29 DIAGNOSIS — Z99.89 OBSTRUCTIVE SLEEP APNEA ON CPAP: Primary | ICD-10-CM

## 2018-06-29 DIAGNOSIS — G47.33 OBSTRUCTIVE SLEEP APNEA ON CPAP: Primary | ICD-10-CM

## 2018-06-29 DIAGNOSIS — G47.30 SEVERE SLEEP APNEA: ICD-10-CM

## 2018-06-29 PROCEDURE — 99213 OFFICE O/P EST LOW 20 MIN: CPT | Performed by: NURSE PRACTITIONER

## 2018-06-29 NOTE — PATIENT INSTRUCTIONS
1    your CPAP equipment today  2  Begin using your CPAP equipment every night  3  Begin cleaning your CPAP daily after use and once weekly with 1 part white vinegar and 10 parts water  4  Contact your medical equipment distributor regarding any questions concerning your CPAP equipment  5  Contact the [address] with any other questions, prior to next visit, if needed  6    Schedule compliance follow-up visit in 2-3 months

## 2018-06-29 NOTE — PROGRESS NOTES
Progress Note - Campbell Rodriguez 59 y o  male   LQM:[id number], MRN: [id number]  6/29/2018          Follow Up Evaluation / Problem:     Severe Obstructive Sleep Apnea    HPI: Jose L Qureshi is a 59y o  year old male  He presents today to review the results of his recent CPAP titration study  He completed a home sleep study on May 14th which showed severe obstructive sleep apnea  He has a long history of loud snoring and some difficulty with breathing during sleep  He complains of difficulty with daytime sleepiness , especially later in the afternoon with a moderate chance of dozing sedentary activities  He denies any symptoms of restless legs at sleep initiation, however, he states that his wife tells him that he moves his legs frequently during sleep  He is unaware of his leg movements and they do not awaken him from sleep  Review of Systems      Genitourinary need to urinate more than twice a night and difficulty with erection   Cardiology none   Gastrointestinal frequent heartburn/acid reflux   Neurology muscle weakness and difficulty with memory   Constitutional fatigue   Integumentary none   Psychiatry none   Musculoskeletal joint pain, back pain and leg cramps   Pulmonary shortness of breath with activity and snoring   ENT none   Endocrine frequent urination   Hematological none       Current Outpatient Prescriptions:     phenytoin (DILANTIN) 100 mg ER capsule, TAKE 1 CAPSULE 3 TIMES DAILY  , Disp: 90 capsule, Rfl: 3    valsartan-hydrochlorothiazide (DIOVAN-HCT) 160-12 5 MG per tablet, TAKE 1 TABLET BY MOUTH DAILY, Disp: 30 tablet, Rfl: 5    [place] Sleepiness Scale  Sitting and reading: Slight chance of dozing  Watching TV: Moderate chance of dozing  Sitting, inactive in a public place (e g  a theatre or a meeting): Slight chance of dozing  As a passenger in a car for an hour without a break: Slight chance of dozing  Lying down to rest in the afternoon when circumstances permit: Moderate chance of dozing  Sitting and talking to someone: Slight chance of dozing  Sitting quietly after a lunch without alcohol: Moderate chance of dozing  In a car, while stopped for a few minutes in traffic: Moderate chance of dozing  Total score: 12              Vitals:    06/29/18 0800   BP: 132/84   Pulse: 78   Weight: 110 kg (242 lb 11 2 oz)   Height: 5' 7" (1 702 m)       Body mass index is 38 01 kg/m²  Neck Circumference: 43       EPWORTH SLEEPINESS SCORE  Total score: 12      Past History Since Last Sleep Center Visit:     He denies any changes to his health since his initial visit and sleep study  His CPAP titration study results are as follows:  Sleep latency was 23 minutes and 22 seconds  Sleep efficiency was 64 5%  REM latency is 1 hour 16 minutes  Were abnormal breathing events were identified  The AHI was 0 9 events per hour  Fifty-one periodic limb movements were also recorded  The PLMI is 11 5 abnormal events per hour  Eighty-six arousals were noted  The arousal index is 19 4 events per hour  Sleep architecture was highly fragmented throughout  Nasal CPAP was started at 5 cm of water and gradually increased to 10 cm of water  The patient appeared to do best using 10 cm of nasal CPAP  This was delivered using a [employer] [facility] interface device  Continued use of this equipment at these parameters is recommended  The review of systems and following portions of the patient's history were reviewed and updated as appropriate: allergies, current medications, past family history, past medical history, past social history, past surgical history, and problem list     OBJECTIVE    PAP Pressure: Nasal CPAP set to deliver 10 cm of water pressure  DME Provider:  [employer]    Physical Exam:     General Appearance:   Alert, cooperative, no distress, appears stated age, obese     Head:   Normocephalic, without obvious abnormality, atraumatic     Eyes:   PERRL, conjunctiva/corneas clear, EOM's intact          Nose:  Nares normal, septum midline, no drainage or sinus tenderness           Throat:  Lips, teeth and gums normal; tongue normal size and  shape and midline mucosa moist and redundant bilaterally, uvula partially visualized, tonsils not visualized, Mallampati class 4       Neck:  Supple, symmetrical, trachea midline, no adenopathy; Thyroid: No enlargement, tenderness or nodules; no carotid bruit or JVD     Lungs:      Clear to auscultation bilaterally, respirations unlabored     Heart:   Regular rate and rhythm, S1 and S2 normal, no murmur, rub or gallop       Extremities:  Extremities normal, atraumatic, no cyanosis or edema     Pulses:  2+ and symmetric all extremities     Skin:  Skin color, texture, turgor normal, no rashes or lesions       Neurologic:  CNII-XII intact  Normal strength, sensation throughout       ASSESSMENT / PLAN    1  Obstructive sleep apnea on CPAP     2  Severe sleep apnea             Counseling / Coordination of Care  Total clinic time spent today 15 minutes  Greater than 50% of total time was spent with the patient and / or family counseling and / or coordination of care  A description of the counseling / coordination of care:     Impressions, Diagnostic results, Prognosis, Instructions for management, Risks and benefits of treatment, Patient and family education, Risk factor reductions and Importance of compliance with treatment    I reviewed the recent test results with the patient  We talked about the abnormal findings, including those consistent with Obstructive Sleep Apnea  We then discussed how the these are categorized as mild, moderate or severe  We also covered the medical comorbidities associated with untreated Obstructive Sleep Apnea including, hypertension, cardiac arrythmia, myocardial infarction and stroke  I explained how the risk of these conditions increases with the severity of the test results    I also spoke in some detail about the most common treatment options including weight loss, nasal PAP, mandibular advancement devices and uvulopalatopharyngoplasty (UPPP)  I answered all questions posed to me and gave my opinion regarding the best options for treatment based on the patient and their level of disease  In this case he chose to begin treatment using PAP treatment  He will return in 4 to 12 weeks for a review of compliance data collected since they began to use their equipment  We will discuss this data and talk about any problems that may prevent successful treatment of Obstructive Sleep Apnea  Changes will be made in treatment parameters or interface devices in order to improve his ability to continue using PAP to maintain upper airway patency during sleep  We discussed the fragmentation of his sleep, noted especially during the second part of the study  He states that he had a difficult time sleeping during his study due to the wires and strange environment  We will further evaluate his daytime wakefulness after he begins the use of his CPAP equipment  The following instructions have been given to the patient today:    Patient Instructions   1   your CPAP equipment today  2  Begin using your CPAP equipment every night  3  Begin cleaning your CPAP daily after use and once weekly with 1 part white vinegar and 10 parts water  4  Contact your medical equipment distributor regarding any questions concerning your CPAP equipment  5  Contact the Sleep [address] [place] [address] with any other questions, prior to next visit, if needed  6    Schedule compliance follow-up visit in 2-3 months        Best Gonsalez [address] [facility]

## 2018-07-06 DIAGNOSIS — G40.909 SEIZURE DISORDER (HCC): ICD-10-CM

## 2018-07-06 RX ORDER — PHENYTOIN SODIUM 100 MG/1
CAPSULE, EXTENDED RELEASE ORAL
Qty: 90 CAPSULE | Refills: 3 | Status: SHIPPED | OUTPATIENT
Start: 2018-07-06 | End: 2018-11-24 | Stop reason: SDUPTHER

## 2018-08-23 ENCOUNTER — OFFICE VISIT (OUTPATIENT)
Dept: FAMILY MEDICINE CLINIC | Facility: CLINIC | Age: 58
End: 2018-08-23
Payer: COMMERCIAL

## 2018-08-23 VITALS
HEIGHT: 67 IN | OXYGEN SATURATION: 97 % | WEIGHT: 250.5 LBS | TEMPERATURE: 98.1 F | SYSTOLIC BLOOD PRESSURE: 132 MMHG | DIASTOLIC BLOOD PRESSURE: 86 MMHG | BODY MASS INDEX: 39.31 KG/M2 | HEART RATE: 70 BPM

## 2018-08-23 DIAGNOSIS — M48.061 LUMBAR FORAMINAL STENOSIS: Primary | ICD-10-CM

## 2018-08-23 DIAGNOSIS — M47.816 LUMBAR SPONDYLOSIS: ICD-10-CM

## 2018-08-23 DIAGNOSIS — C64.1 RENAL CELL CARCINOMA, RIGHT (HCC): ICD-10-CM

## 2018-08-23 DIAGNOSIS — M72.2 PLANTAR FASCIITIS: ICD-10-CM

## 2018-08-23 PROCEDURE — 99214 OFFICE O/P EST MOD 30 MIN: CPT | Performed by: FAMILY MEDICINE

## 2018-08-23 NOTE — PATIENT INSTRUCTIONS
Arthritis   WHAT YOU NEED TO KNOW:   Arthritis is a disease that causes inflammation in one or more joints  There are many types of arthritis, such as osteoarthritis, rheumatoid arthritis, and septic arthritis  Some types cause inflammation in the joints  Other types wear away the cartilage between joints  This makes the bones of the joint rub together when you move the joint  Your symptoms may be constant, or symptoms may come and go  Arthritis often gets worse over time and can cause permanent joint damage  DISCHARGE INSTRUCTIONS:   Return to the emergency department if:   · You have a fever and severe joint pain or swelling  · You cannot move the affected joint  · You have severe joint pain you cannot tolerate  Contact your healthcare provider if:   · Your pain or swelling does not get better with treatment  · You have questions or concerns about your condition or care  Medicines:   · Acetaminophen  decreases pain and fever  It is available without a doctor's order  Ask how much to take and how often to take it  Follow directions  Acetaminophen can cause liver damage if not taken correctly  · NSAIDs , such as ibuprofen, help decrease swelling, pain, and fever  This medicine is available with or without a doctor's order  NSAIDs can cause stomach bleeding or kidney problems in certain people  If you take blood thinner medicine, always ask your healthcare provider if NSAIDs are safe for you  Always read the medicine label and follow directions  · Steroids  reduce swelling and pain  · Prescription pain medicine  may be given  Do not wait until the pain is severe before you take your medicine  Ask your healthcare provider how to take this medicine safely  · Take your medicine as directed  Contact your healthcare provider if you think your medicine is not helping or if you have side effects  Tell him of her if you are allergic to any medicine   Keep a list of the medicines, vitamins, and herbs you take  Include the amounts, and when and why you take them  Bring the list or the pill bottles to follow-up visits  Carry your medicine list with you in case of an emergency  Follow up with your healthcare provider or rheumatologist as directed:  Write down your questions so you remember to ask them during your visits  Manage arthritis:   · Rest your painful joint so it can heal   Your healthcare provider may recommend crutches or a walker if the affected joint is in a leg  · Apply ice or heat to the joint  Both can help decrease swelling and pain  Ice may also help prevent tissue damage  Use an ice pack, or put crushed ice in a plastic bag  Cover it with a towel and place it on your joint for 15 to 20 minutes every hour or as directed  You can apply heat for 20 minutes every 2 hours  Heat treatment includes hot packs or heat lamps  · Elevate your joint  Elevation helps reduce swelling and pain  Raise your joint above the level of your heart as often as you can  Prop your painful joint on pillows to keep it above your heart comfortably  · Go to therapy as directed  A physical therapist can teach you exercises to improve flexibility and range of motion  You may also be shown non-weight-bearing exercises that are safe for your joints, such as swimming  Exercise can help keep your joints flexible and reduce pain  An occupational therapist can help you learn to do your daily activities when your joints are stiff or sore  · Maintain a healthy weight  Extra weight puts increased pressure on your joints  Ask your healthcare provider what you should weigh  If you need to lose weight, he can help you create a weight loss program  Weight loss can help reduce pain and increase your ability to do your activities  The amount of exercise you do may vary each day, depending on your symptoms  · Wear flat or low-heeled shoes    This will help decrease pain and reduce pressure on your ankle, knee, and hip joints  · Use support devices  You may be given splints to wear on your hands to help your joints rest and to decrease inflammation  While you sleep, use a pillow that is firm enough to support your neck and head  Support equipment:  The following may help you move and prevent falls:  · Orthotic shoes or insoles  help support your feet when you walk  · Crutches, a cane, or a walker  may help decrease your risk for falling  They also decrease stress on affected joints  · Devices to prevent falls  include raised toilet seats and bathtub bars to help you get up from sitting  Handrails can be placed in areas where you need balance and support  © 2017 [facility] Information is for End User's use only and may not be sold, redistributed or otherwise used for commercial purposes  All illustrations and images included in CareNotes® are the copyrighted property of A D A M , Inc  or Pedro Mcwilliams  The above information is an  only  It is not intended as medical advice for individual conditions or treatments  Talk to your doctor, nurse or pharmacist before following any medical regimen to see if it is safe and effective for you

## 2018-08-23 NOTE — PROGRESS NOTES
Assessment/Plan:    No problem-specific Assessment & Plan notes found for this encounter  Diagnoses and all orders for this visit:    Lumbar foraminal stenosis  -     Ambulatory referral to Physical Therapy; Future    Lumbar spondylosis  -     Ambulatory referral to Physical Therapy; Future    Plantar fasciitis    Renal cell carcinoma, right (HCC)        FOLLOW UP WITH PAIN MANAGEMENT  GET TO PHYSICAL THERAPY  SAMPLES OF PREDNISONE GIVEN  Subjective:   Chief Complaint   Patient presents with    Leg Pain     PT IS HERE FOR B/L LEG PAIN SINCE FIRST WEEK OF JULY  PT NOTED IN ADDITION HE IS HAVING BACK AND HEEL PAIN  DISCUSS CHANGING VALSARTAN  Patient ID: Blossom Sim is a 59 y o  male  HERE WITH LOW BACK PAIN RADIATING TO BOTH LEGS  NO INCONTINENCE  NO TRAUMA  Leg Pain    Pertinent negatives include no numbness  The following portions of the patient's history were reviewed and updated as appropriate: allergies, current medications, past family history, past medical history, past social history, past surgical history and problem list     Review of Systems   Constitutional: Negative for activity change, appetite change, chills, diaphoresis, fatigue and unexpected weight change  HENT: Negative for congestion, ear discharge, ear pain, hearing loss, nosebleeds and rhinorrhea  Eyes: Negative for pain, redness, itching and visual disturbance  Respiratory: Negative for cough, choking, chest tightness and shortness of breath  Cardiovascular: Negative for chest pain and leg swelling  Gastrointestinal: Negative for abdominal pain, blood in stool, constipation, diarrhea and nausea  Endocrine: Negative for cold intolerance, polydipsia and polyphagia  Genitourinary: Negative for dysuria, frequency, hematuria and urgency  Musculoskeletal: Positive for back pain and myalgias  Negative for arthralgias, gait problem, joint swelling, neck pain and neck stiffness     Skin: Negative for color change and rash  Allergic/Immunologic: Negative for environmental allergies and food allergies  Neurological: Positive for weakness  Negative for dizziness, tremors, seizures, speech difficulty, numbness and headaches  Hematological: Negative for adenopathy  Does not bruise/bleed easily  Psychiatric/Behavioral: Negative for behavioral problems, dysphoric mood, hallucinations and self-injury  Objective:  Vitals:    08/23/18 1452   BP: 132/86   Pulse: 70   Temp: 98 1 °F (36 7 °C)   SpO2: 97%   Weight: 114 kg (250 lb 8 oz)   Height: 5' 7" (1 702 m)      Physical Exam   Constitutional: He is oriented to person, place, and time  He appears well-developed and well-nourished  No distress  HENT:   Head: Normocephalic and atraumatic  Right Ear: External ear normal    Left Ear: External ear normal    Nose: Nose normal    Mouth/Throat: Oropharynx is clear and moist  No oropharyngeal exudate  Eyes: Conjunctivae and EOM are normal  Pupils are equal, round, and reactive to light  Right eye exhibits no discharge  Left eye exhibits no discharge  No scleral icterus  Neck: Normal range of motion  Neck supple  No thyromegaly present  Cardiovascular: Normal rate, regular rhythm and normal heart sounds  No murmur heard  Pulmonary/Chest: Effort normal and breath sounds normal  He has no wheezes  He has no rales  Abdominal: Soft  Bowel sounds are normal  He exhibits no mass  There is no tenderness  There is no guarding  Musculoskeletal: Normal range of motion  He exhibits tenderness  He exhibits no edema  Lymphadenopathy:     He has no cervical adenopathy  Neurological: He is alert and oriented to person, place, and time  He displays abnormal reflex  Skin: Skin is warm and dry  He is not diaphoretic  Psychiatric: He has a normal mood and affect   Judgment and thought content normal

## 2018-09-12 ENCOUNTER — TRANSCRIBE ORDERS (OUTPATIENT)
Dept: SLEEP CENTER | Facility: CLINIC | Age: 58
End: 2018-09-12

## 2018-09-12 DIAGNOSIS — Z99.89 DEPENDENCE ON OTHER ENABLING MACHINES AND DEVICES: ICD-10-CM

## 2018-09-12 DIAGNOSIS — G47.33 OBSTRUCTIVE SLEEP APNEA: Primary | ICD-10-CM

## 2018-09-12 DIAGNOSIS — G47.30 SLEEP APNEA: ICD-10-CM

## 2018-09-18 ENCOUNTER — OFFICE VISIT (OUTPATIENT)
Dept: SLEEP CENTER | Facility: CLINIC | Age: 58
End: 2018-09-18
Payer: COMMERCIAL

## 2018-09-18 VITALS
DIASTOLIC BLOOD PRESSURE: 78 MMHG | HEIGHT: 67 IN | BODY MASS INDEX: 39.46 KG/M2 | WEIGHT: 251.4 LBS | SYSTOLIC BLOOD PRESSURE: 122 MMHG | HEART RATE: 78 BPM

## 2018-09-18 DIAGNOSIS — Z99.89 CPAP (CONTINUOUS POSITIVE AIRWAY PRESSURE) DEPENDENCE: ICD-10-CM

## 2018-09-18 DIAGNOSIS — G47.33 SEVERE OBSTRUCTIVE SLEEP APNEA: Primary | ICD-10-CM

## 2018-09-18 DIAGNOSIS — IMO0001 CLASS 2 OBESITY WITH SERIOUS COMORBIDITY AND BODY MASS INDEX (BMI) OF 39.0 TO 39.9 IN ADULT, UNSPECIFIED OBESITY TYPE: ICD-10-CM

## 2018-09-18 DIAGNOSIS — G47.61 PLMD (PERIODIC LIMB MOVEMENT DISORDER): ICD-10-CM

## 2018-09-18 DIAGNOSIS — I10 ESSENTIAL HYPERTENSION: ICD-10-CM

## 2018-09-18 PROCEDURE — 99213 OFFICE O/P EST LOW 20 MIN: CPT | Performed by: NURSE PRACTITIONER

## 2018-09-18 RX ORDER — VALSARTAN AND HYDROCHLOROTHIAZIDE 160; 12.5 MG/1; MG/1
1 TABLET, FILM COATED ORAL DAILY
Qty: 30 TABLET | Refills: 5 | Status: CANCELLED | OUTPATIENT
Start: 2018-09-18

## 2018-09-18 NOTE — PROGRESS NOTES
Progress Note - Campbell Rodriguez 59 y o  male   HQR:[birth year], MRN: [id number]  9/18/2018          Follow Up Evaluation / Problem:     Severe Obstructive Sleep Apnea  Periodic Limb Movements in Sleep  Obesity    HPI: Everardo Gambino is a 59y o  year old male  He presents today for follow up of severe obstructive sleep apnea  He was diagnosed after completing a home sleep study, followed by a CPAP titration study  He has been using CPAP successfully since the end of June  He is here today to review his compliance data and evaluate treatment  He reports that since using CPAP, he feels less sleepy in the afternoon between 1pm and 4pm and has needed less frequent afternoon naps since starting CPAP  Review of Systems      Genitourinary need to urinate more than twice a night and difficulty with erection   Cardiology none   Gastrointestinal none   Neurology muscle weakness   Constitutional weight change   Integumentary none   Psychiatry none   Musculoskeletal muscle aches and back pain   Pulmonary none   ENT none   Endocrine frequent urination   Hematological none       Current Outpatient Prescriptions:     phenytoin (DILANTIN) 100 mg ER capsule, TAKE 1 CAPSULE 3 TIMES DAILY  , Disp: 90 capsule, Rfl: 3    valsartan-hydrochlorothiazide (DIOVAN-HCT) 160-12 5 MG per tablet, TAKE 1 TABLET BY MOUTH DAILY, Disp: 30 tablet, Rfl: 5    [place] Sleepiness Scale  Sitting and reading: Moderate chance of dozing  Watching TV: Moderate chance of dozing  Sitting, inactive in a public place (e g  a theatre or a meeting):  Would never doze  As a passenger in a car for an hour without a break: Slight chance of dozing  Lying down to rest in the afternoon when circumstances permit: High chance of dozing  Sitting and talking to someone: Would never doze  Sitting quietly after a lunch without alcohol: Slight chance of dozing  In a car, while stopped for a few minutes in traffic: Would never doze  Total score: 9              Vitals:    09/18/18 1500   BP: 122/78   Pulse: 78   Weight: 114 kg (251 lb 6 4 oz)   Height: 5' 7" (1 702 m)       Body mass index is 39 37 kg/m²  EPWORTH SLEEPINESS SCORE  Total score: 9      Past History Since Last Sleep Center Visit:   He states that he has gained some weight since he began using CPAP  He does not feel that he has made any other lifestyle changes to explain this change  He reports that he has been using his CPAP every night and feels comfortable when using it  He has used the same nasal mask since he received his equipment and feels like it may be starting to leak  He is cleaning it daily with mild soap and water  He reports less daytime sleepiness since using CPAP  His Cushing score has decreased to 9 at this visit, from 12 at his last visit  The review of systems and following portions of the patient's history were reviewed and updated as appropriate: allergies, current medications, past family history, past medical history, past social history, past surgical history, and problem list         OBJECTIVE    PAP Pressure: Nasal CPAP set to deliver 10 cm of water pressure  DME Provider: [employer]    Physical Exam:     General Appearance:   Alert, cooperative, no distress, appears stated age, obese     Head:   Normocephalic, without obvious abnormality, atraumatic     Eyes:   PERRL, conjunctiva/corneas clear, EOM's intact          Nose:  Nares normal, septum midline, no drainage or sinus tenderness           Throat:  Lips, teeth and gums normal; tongue normal size and  shape and midline mucosa moist and redundant bilaterally, uvula partially visible and dipping below the base of the tongue, tonsils not visualized, Mallampati class 4       Neck:  Supple, symmetrical, trachea midline, no adenopathy;  Thyroid: No enlargement, tenderness or nodules; no carotid bruit or JVD     Lungs:      Clear to auscultation bilaterally, respirations unlabored     Heart:   Regular rate and rhythm, S1 and S2 normal, no murmur, rub or gallop       Extremities:  Extremities normal, atraumatic, no cyanosis or edema     Pulses:  2+ and symmetric all extremities     Skin:  Skin color, texture, turgor normal, no rashes or lesions       Neurologic:  No focal deficits  Normal strength, sensation throughout       ASSESSMENT / PLAN    1  Severe obstructive sleep apnea  PAP DME Resupply/Reorder   2  CPAP (continuous positive airway pressure) dependence  Ambulatory referral to Sleep Medicine    PAP DME Resupply/Reorder   3  Class 2 obesity with serious comorbidity and body mass index (BMI) of 39 0 to 39 9 in adult, unspecified obesity type     4  PLMD (periodic limb movement disorder)             Counseling / Coordination of Care  Total clinic time spent today 15 minutes  Greater than 50% of total time was spent with the patient and / or family counseling and / or coordination of care  A description of the counseling / coordination of care:     Impressions, Diagnostic results, Prognosis, Instructions for management, Risks and benefits of treatment, Patient and family education, Risk factor reductions and Importance of compliance with treatment    Today I reviewed the patient's compliance data  he has been able to use the equipment 100% of all days recorded  Average usage was 4 or more hours 100% of all days recorded  The estimated AHI is 1 9 abnormal breathing events per hour  Response to treatment has been very good  Supplies have been ordered for the next year  He will continue cleaning his equipment with mild soap and water daily  He will continue using this equipment at the settings noted above for the next 12 months  At that timehe will then return for a routine follow-up evaluation  We also discussed his periodic limb movements, which may be affecting his sleep architecture, causing some daytime sleepiness as well    He is not interested in pursuing any treatment for this at this time, since he does not feel that it is bothering him or contributing  He was encouraged to make healthy lifestyle changes with diet and exercise in an effort to lose weight  We discussed that pressure changes may be necessary with large fluctuations in weight of 20-30 lbs  I have asked him to contact the [address] if this occurs or if he encounters any difficulties prior to his next visit  The following instructions have been given to the patient today:    Patient Instructions   1  Continue use of CPAP equipment nightly  2  Continue to clean your equipment, as discussed  3  Contact the [address] with any questions or concerns prior to your next visit, as needed  4    Schedule visit for follow-up in 1 year        Omar Mcallister, [address] [place] [address]

## 2018-09-18 NOTE — PATIENT INSTRUCTIONS
1   Continue use of CPAP equipment nightly  2  Continue to clean your equipment, as discussed  3  Contact the [facility] with any questions or concerns prior to your next visit, as needed  4    Schedule visit for follow-up in 1 year

## 2018-09-19 DIAGNOSIS — I10 ESSENTIAL HYPERTENSION: ICD-10-CM

## 2018-09-19 RX ORDER — VALSARTAN AND HYDROCHLOROTHIAZIDE 160; 12.5 MG/1; MG/1
1 TABLET, FILM COATED ORAL DAILY
Qty: 30 TABLET | Refills: 5 | Status: CANCELLED | OUTPATIENT
Start: 2018-09-19

## 2018-10-16 ENCOUNTER — TRANSCRIBE ORDERS (OUTPATIENT)
Dept: ADMINISTRATIVE | Facility: HOSPITAL | Age: 58
End: 2018-10-16

## 2018-10-16 DIAGNOSIS — C64.1 MALIGNANT NEOPLASM OF RIGHT KIDNEY, EXCEPT RENAL PELVIS (HCC): Primary | ICD-10-CM

## 2018-10-23 ENCOUNTER — TRANSCRIBE ORDERS (OUTPATIENT)
Dept: ADMINISTRATIVE | Facility: HOSPITAL | Age: 58
End: 2018-10-23

## 2018-10-23 ENCOUNTER — HOSPITAL ENCOUNTER (OUTPATIENT)
Dept: RADIOLOGY | Facility: HOSPITAL | Age: 58
Discharge: HOME | End: 2018-10-23
Payer: COMMERCIAL

## 2018-10-23 ENCOUNTER — HOSPITAL ENCOUNTER (OUTPATIENT)
Dept: CT IMAGING | Facility: HOSPITAL | Age: 58
Discharge: HOME | End: 2018-10-23
Payer: COMMERCIAL

## 2018-10-23 DIAGNOSIS — C64.1 MALIGNANT NEOPLASM OF RIGHT KIDNEY, EXCEPT RENAL PELVIS (HCC): ICD-10-CM

## 2018-10-23 DIAGNOSIS — C61 MALIGNANT NEOPLASM OF PROSTATE (HCC): ICD-10-CM

## 2018-10-23 DIAGNOSIS — C61 MALIGNANT NEOPLASM OF PROSTATE (HCC): Primary | ICD-10-CM

## 2018-10-23 PROCEDURE — 74176 CT ABD & PELVIS W/O CONTRAST: CPT

## 2018-10-23 PROCEDURE — 71046 X-RAY EXAM CHEST 2 VIEWS: CPT

## 2018-10-26 DIAGNOSIS — I10 ESSENTIAL HYPERTENSION: ICD-10-CM

## 2018-10-26 RX ORDER — VALSARTAN AND HYDROCHLOROTHIAZIDE 160; 12.5 MG/1; MG/1
1 TABLET, FILM COATED ORAL DAILY
Qty: 30 TABLET | Refills: 0 | Status: CANCELLED | OUTPATIENT
Start: 2018-10-26

## 2018-10-29 DIAGNOSIS — I10 ESSENTIAL HYPERTENSION: ICD-10-CM

## 2018-10-29 RX ORDER — VALSARTAN AND HYDROCHLOROTHIAZIDE 160; 12.5 MG/1; MG/1
1 TABLET, FILM COATED ORAL DAILY
Qty: 30 TABLET | Refills: 5 | Status: CANCELLED | OUTPATIENT
Start: 2018-10-29

## 2018-10-30 DIAGNOSIS — I10 ESSENTIAL HYPERTENSION: ICD-10-CM

## 2018-10-30 RX ORDER — VALSARTAN AND HYDROCHLOROTHIAZIDE 160; 12.5 MG/1; MG/1
1 TABLET, FILM COATED ORAL DAILY
Qty: 30 TABLET | Refills: 0 | Status: SHIPPED | OUTPATIENT
Start: 2018-10-30 | End: 2018-11-26 | Stop reason: SDUPTHER

## 2018-10-30 NOTE — TELEPHONE ENCOUNTER
PATIENT NEEDING REFILLS ON HIS DIOVAN HCT TO BE SENT TO [facility]  PATIENT STATES HAS BEEN CALLING FOR WEEKS REQUESTING MED AND NOBODY REFILLED  IS OUT OF MEDS  PLEASE ADVISE

## 2018-11-24 DIAGNOSIS — G40.909 SEIZURE DISORDER (HCC): ICD-10-CM

## 2018-11-26 DIAGNOSIS — I10 ESSENTIAL HYPERTENSION: ICD-10-CM

## 2018-11-26 RX ORDER — OLMESARTAN MEDOXOMIL AND HYDROCHLOROTHIAZIDE 40/12.5 40; 12.5 MG/1; MG/1
1 TABLET ORAL DAILY
Qty: 30 TABLET | Refills: 0 | Status: SHIPPED | OUTPATIENT
Start: 2018-11-26 | End: 2018-12-13

## 2018-11-26 RX ORDER — VALSARTAN AND HYDROCHLOROTHIAZIDE 160; 12.5 MG/1; MG/1
TABLET, FILM COATED ORAL
Qty: 30 TABLET | Refills: 0 | Status: CANCELLED | OUTPATIENT
Start: 2018-11-26

## 2018-11-26 RX ORDER — PHENYTOIN SODIUM 100 MG/1
CAPSULE, EXTENDED RELEASE ORAL
Qty: 90 CAPSULE | Refills: 3 | Status: SHIPPED | OUTPATIENT
Start: 2018-11-26 | End: 2019-05-14 | Stop reason: SDUPTHER

## 2018-11-26 NOTE — TELEPHONE ENCOUNTER
Please let the patient know that I am refilling a 30 day supply of his blood pressure medication but he is well overdue for lab work  The labs were ordered to [facility] in April  Prior to any further refills he will need to have the labs completed in follow-up in the office for a blood pressure check and to review the results

## 2018-12-05 LAB
ALBUMIN SERPL-MCNC: 4.3 G/DL (ref 3.6–4.8)
ALBUMIN/GLOB SERPL: 1.7 {RATIO} (ref 1.2–2.2)
ALP SERPL-CCNC: 78 IU/L (ref 39–117)
ALT SERPL-CCNC: 22 IU/L (ref 0–44)
AST SERPL-CCNC: 20 IU/L (ref 0–40)
BASOPHILS # BLD AUTO: 0 X10E3/UL (ref 0–0.2)
BASOPHILS NFR BLD AUTO: 0 %
BILIRUB SERPL-MCNC: 0.4 MG/DL (ref 0–1.2)
BUN SERPL-MCNC: 15 MG/DL (ref 8–27)
BUN/CREAT SERPL: 11 (ref 10–24)
CALCIUM SERPL-MCNC: 9.2 MG/DL (ref 8.6–10.2)
CHLORIDE SERPL-SCNC: 97 MMOL/L (ref 96–106)
CHOLEST SERPL-MCNC: 239 MG/DL (ref 100–199)
CHOLEST/HDLC SERPL: 5.3 RATIO (ref 0–5)
CO2 SERPL-SCNC: 25 MMOL/L (ref 20–29)
CREAT SERPL-MCNC: 1.4 MG/DL (ref 0.76–1.27)
EOSINOPHIL # BLD AUTO: 0.1 X10E3/UL (ref 0–0.4)
EOSINOPHIL NFR BLD AUTO: 1 %
ERYTHROCYTE [DISTWIDTH] IN BLOOD BY AUTOMATED COUNT: 13.9 % (ref 12.3–15.4)
GLOBULIN SER-MCNC: 2.6 G/DL (ref 1.5–4.5)
GLUCOSE SERPL-MCNC: 97 MG/DL (ref 65–99)
HCT VFR BLD AUTO: 45.9 % (ref 37.5–51)
HCV AB S/CO SERPL IA: 0.1 S/CO RATIO (ref 0–0.9)
HDLC SERPL-MCNC: 45 MG/DL
HGB BLD-MCNC: 15.7 G/DL (ref 13–17.7)
IMM GRANULOCYTES # BLD: 0 X10E3/UL (ref 0–0.1)
IMM GRANULOCYTES NFR BLD: 0 %
LDLC SERPL CALC-MCNC: 154 MG/DL (ref 0–99)
LYMPHOCYTES # BLD AUTO: 2.4 X10E3/UL (ref 0.7–3.1)
LYMPHOCYTES NFR BLD AUTO: 27 %
MCH RBC QN AUTO: 30.4 PG (ref 26.6–33)
MCHC RBC AUTO-ENTMCNC: 34.2 G/DL (ref 31.5–35.7)
MCV RBC AUTO: 89 FL (ref 79–97)
MONOCYTES # BLD AUTO: 0.7 X10E3/UL (ref 0.1–0.9)
MONOCYTES NFR BLD AUTO: 7 %
NEUTROPHILS # BLD AUTO: 5.7 X10E3/UL (ref 1.4–7)
NEUTROPHILS NFR BLD AUTO: 65 %
PLATELET # BLD AUTO: 266 X10E3/UL (ref 150–379)
POTASSIUM SERPL-SCNC: 4.1 MMOL/L (ref 3.5–5.2)
PROT SERPL-MCNC: 6.9 G/DL (ref 6–8.5)
RBC # BLD AUTO: 5.17 X10E6/UL (ref 4.14–5.8)
SL AMB EGFR AFRICAN AMERICAN: 63 ML/MIN/1.73
SL AMB EGFR NON AFRICAN AMERICAN: 54 ML/MIN/1.73
SL AMB VLDL CHOLESTEROL CALC: 40 MG/DL (ref 5–40)
SODIUM SERPL-SCNC: 141 MMOL/L (ref 134–144)
TRIGL SERPL-MCNC: 198 MG/DL (ref 0–149)
TSH SERPL DL<=0.005 MIU/L-ACNC: 1.68 UIU/ML (ref 0.45–4.5)
WBC # BLD AUTO: 9 X10E3/UL (ref 3.4–10.8)

## 2018-12-13 ENCOUNTER — OFFICE VISIT (OUTPATIENT)
Dept: FAMILY MEDICINE CLINIC | Facility: CLINIC | Age: 58
End: 2018-12-13
Payer: COMMERCIAL

## 2018-12-13 VITALS
HEART RATE: 82 BPM | BODY MASS INDEX: 39.24 KG/M2 | OXYGEN SATURATION: 97 % | TEMPERATURE: 97.5 F | SYSTOLIC BLOOD PRESSURE: 134 MMHG | HEIGHT: 67 IN | DIASTOLIC BLOOD PRESSURE: 80 MMHG | WEIGHT: 250 LBS

## 2018-12-13 DIAGNOSIS — M51.17 INTERVERTEBRAL DISC DISORDERS WITH RADICULOPATHY, LUMBOSACRAL REGION: ICD-10-CM

## 2018-12-13 DIAGNOSIS — N18.30 CKD (CHRONIC KIDNEY DISEASE) STAGE 3, GFR 30-59 ML/MIN (HCC): Primary | ICD-10-CM

## 2018-12-13 DIAGNOSIS — M48.07 LUMBOSACRAL SPINAL STENOSIS: ICD-10-CM

## 2018-12-13 DIAGNOSIS — Z90.5 H/O UNILATERAL NEPHRECTOMY: ICD-10-CM

## 2018-12-13 DIAGNOSIS — Z85.46 HISTORY OF PROSTATE CANCER: ICD-10-CM

## 2018-12-13 DIAGNOSIS — I10 BENIGN ESSENTIAL HYPERTENSION: ICD-10-CM

## 2018-12-13 DIAGNOSIS — E78.2 MIXED HYPERLIPIDEMIA: ICD-10-CM

## 2018-12-13 PROBLEM — R06.81 WITNESSED EPISODE OF APNEA: Status: RESOLVED | Noted: 2018-04-13 | Resolved: 2018-12-13

## 2018-12-13 PROBLEM — G47.30 SEVERE SLEEP APNEA: Status: RESOLVED | Noted: 2018-05-21 | Resolved: 2018-12-13

## 2018-12-13 PROBLEM — M43.10 SPONDYLOLISTHESIS, ACQUIRED: Status: RESOLVED | Noted: 2018-01-09 | Resolved: 2018-12-13

## 2018-12-13 PROBLEM — R53.83 OTHER FATIGUE: Status: RESOLVED | Noted: 2018-04-13 | Resolved: 2018-12-13

## 2018-12-13 PROCEDURE — 3079F DIAST BP 80-89 MM HG: CPT | Performed by: FAMILY MEDICINE

## 2018-12-13 PROCEDURE — 3075F SYST BP GE 130 - 139MM HG: CPT | Performed by: FAMILY MEDICINE

## 2018-12-13 PROCEDURE — 3008F BODY MASS INDEX DOCD: CPT | Performed by: FAMILY MEDICINE

## 2018-12-13 PROCEDURE — 99214 OFFICE O/P EST MOD 30 MIN: CPT | Performed by: FAMILY MEDICINE

## 2018-12-13 RX ORDER — ATORVASTATIN CALCIUM 10 MG/1
10 TABLET, FILM COATED ORAL DAILY
Qty: 90 TABLET | Refills: 1 | Status: SHIPPED | OUTPATIENT
Start: 2018-12-13 | End: 2019-08-01 | Stop reason: ALTCHOICE

## 2018-12-13 NOTE — ASSESSMENT & PLAN NOTE
As stated above he will remain off of the almost certain hydrochlorothiazide at this time unless his blood pressures rise above 140/90 at home at which point he will restart the medicine and call me

## 2018-12-13 NOTE — PATIENT INSTRUCTIONS
Ask the UROLOGIST if you need to see a NEPHROLOGIST? Check the blood pressures at home  If the numbers are over 140/90 go ahead and restart the medicine but call and let me know  Make an appt with Dr Gómez Montiel - call us for the insurance referral once you have the appt date and time

## 2018-12-13 NOTE — ASSESSMENT & PLAN NOTE
He will continue to follow up with his urologist as scheduled  He says his PSA went up ever so slightly so he is following up in six months now onset of a year

## 2018-12-13 NOTE — ASSESSMENT & PLAN NOTE
The patient admits he has not had a cholesterol level done in years  He does not even remember what his previous cholesterol was  At this point I think it is advisable to start him on a statin, especially given the kidney disease  He agrees to this and I am starting atorvastatin 10 mg  We can recheck the lipids in six months

## 2018-12-13 NOTE — PROGRESS NOTES
Subjective:   Chief Complaint   Patient presents with    Follow-up     review labs  Patient ID: Jerry Chandler is a 59 y o  male      The patient is here today to follow-up on his recent lab results  He has been dealing with significant lower back pain radiating into the leg for over a year  He had an MRI in January of 2018 that showed some significant lumbar spinal disease  He did see pain management did have steroid injections that helped a little bit  He has hypertension and has not had any kind of routine blood work including cholesterol in years  He had renal cell carcinoma and prostate cancer so he has been following routinely with the urologist at [facility]  He had a nephrectomy and prostatectomy in 2016  He has had labs related to this but nothing otherwise so we ordered him on labs here    Lab Results       Component                Value               Date                       LDLC                     154 (H)             12/04/2018            Lab Results       Component                Value               Date                       TRIG                     198 (H)             12/04/2018            Lab Results       Component                Value               Date                       HDL                      45                  12/04/2018            Lab Results       Component                Value               Date                       CREAT                    1 40 (H)            12/04/2018            Lab Results       Component                Value               Date                       EGFRNONAFA               54 (L)              12/04/2018              Additionally, his blood pressure medication was changed due to recalls  He picked up last week because the pharmacy called and said he had something but he did not know what it was because it was a new name  He has not taken it now in eight days  He has a blood pressure cuff at home but has not checked his blood pressure  He denies any chest pain or shortness of breath  He denies any headaches  He has generally been feeling okay other than the back pain        The following portions of the patient's history were reviewed and updated as appropriate: allergies, current medications, past family history, past medical history, past social history, past surgical history and problem list     Review of Systems      Objective:  Vitals:    12/13/18 1553   BP: 134/80   Pulse: 82   Temp: 97 5 °F (36 4 °C)   SpO2: 97%   Weight: 113 kg (250 lb)   Height: 5' 7" (1 702 m)      Physical Exam   Constitutional: He is oriented to person, place, and time  He appears well-developed and well-nourished  HENT:   Head: Normocephalic and atraumatic  Right Ear: External ear normal    Left Ear: External ear normal    Nose: Nose normal    Mouth/Throat: Oropharynx is clear and moist    Eyes: Pupils are equal, round, and reactive to light  Conjunctivae and EOM are normal    Neck: Normal range of motion  Neck supple  Cardiovascular: Normal rate, regular rhythm and normal heart sounds  Pulmonary/Chest: Effort normal and breath sounds normal    Musculoskeletal: Normal range of motion  He exhibits no edema  Neurological: He is alert and oriented to person, place, and time  Skin: Skin is warm, dry and intact  Psychiatric: He has a normal mood and affect  His behavior is normal  Judgment and thought content normal    Nursing note and vitals reviewed  Assessment/Plan:    Benign essential hypertension  As stated above he will remain off of the almost certain hydrochlorothiazide at this time unless his blood pressures rise above 140/90 at home at which point he will restart the medicine and call me  Hyperlipidemia  The patient admits he has not had a cholesterol level done in years  He does not even remember what his previous cholesterol was  At this point I think it is advisable to start him on a statin, especially given the kidney disease    He agrees to this and I am starting atorvastatin 10 mg  We can recheck the lipids in six months  Intervertebral disc disorders with radiculopathy, lumbosacral region  The patient's major physical complaints at this time as his back  Things are not getting better and in fact he feels they are getting worse  I have encouraged him to call and make a follow-up appointment with pain management to discuss further options  History of prostate cancer  He will continue to follow up with his urologist as scheduled  He says his PSA went up ever so slightly so he is following up in six months now onset of a year  Diagnoses and all orders for this visit:    CKD (chronic kidney disease) stage 3, GFR 30-59 ml/min ([facility])    Benign essential hypertension    Mixed hyperlipidemia  -     atorvastatin (LIPITOR) 10 mg tablet; Take 1 tablet (10 mg total) by mouth daily    Lumbosacral spinal stenosis  -     Ambulatory referral to Pain Management; Future    Intervertebral disc disorders with radiculopathy, lumbosacral region  -     Ambulatory referral to Pain Management;  Future    H/O unilateral nephrectomy    History of prostate cancer

## 2018-12-13 NOTE — ASSESSMENT & PLAN NOTE
The patient's major physical complaints at this time as his back  Things are not getting better and in fact he feels they are getting worse  I have encouraged him to call and make a follow-up appointment with pain management to discuss further options

## 2019-01-01 DIAGNOSIS — I10 ESSENTIAL HYPERTENSION: ICD-10-CM

## 2019-01-02 RX ORDER — OLMESARTAN MEDOXOMIL AND HYDROCHLOROTHIAZIDE 40/12.5 40; 12.5 MG/1; MG/1
TABLET ORAL
Qty: 90 TABLET | Refills: 0 | Status: SHIPPED | OUTPATIENT
Start: 2019-01-02 | End: 2019-08-01 | Stop reason: ALTCHOICE

## 2019-04-17 ENCOUNTER — TELEPHONE (OUTPATIENT)
Dept: GASTROENTEROLOGY | Facility: CLINIC | Age: 59
End: 2019-04-17

## 2019-05-14 DIAGNOSIS — G40.909 SEIZURE DISORDER (HCC): ICD-10-CM

## 2019-05-14 RX ORDER — PHENYTOIN SODIUM 100 MG/1
CAPSULE, EXTENDED RELEASE ORAL
Qty: 90 CAPSULE | Refills: 3 | Status: SHIPPED | OUTPATIENT
Start: 2019-05-14 | End: 2019-10-31 | Stop reason: SDUPTHER

## 2019-06-13 ENCOUNTER — OFFICE VISIT (OUTPATIENT)
Dept: FAMILY MEDICINE CLINIC | Facility: CLINIC | Age: 59
End: 2019-06-13
Payer: COMMERCIAL

## 2019-06-13 VITALS
HEIGHT: 67 IN | HEART RATE: 68 BPM | OXYGEN SATURATION: 97 % | WEIGHT: 254.5 LBS | DIASTOLIC BLOOD PRESSURE: 86 MMHG | TEMPERATURE: 96.9 F | SYSTOLIC BLOOD PRESSURE: 140 MMHG | BODY MASS INDEX: 39.94 KG/M2

## 2019-06-13 DIAGNOSIS — E66.9 OBESITY (BMI 35.0-39.9 WITHOUT COMORBIDITY): ICD-10-CM

## 2019-06-13 DIAGNOSIS — Z12.11 SCREENING FOR COLON CANCER: ICD-10-CM

## 2019-06-13 DIAGNOSIS — A08.4 VIRAL GASTROENTERITIS: ICD-10-CM

## 2019-06-13 DIAGNOSIS — S30.860A TICK BITE OF BACK, INITIAL ENCOUNTER: Primary | ICD-10-CM

## 2019-06-13 DIAGNOSIS — K63.5 POLYP OF SIGMOID COLON, UNSPECIFIED TYPE: ICD-10-CM
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
## 2019-06-13 PROCEDURE — 99214 OFFICE O/P EST MOD 30 MIN: CPT | Performed by: FAMILY MEDICINE

## 2019-06-13 PROCEDURE — 1036F TOBACCO NON-USER: CPT | Performed by: FAMILY MEDICINE

## 2019-06-13 PROCEDURE — 3008F BODY MASS INDEX DOCD: CPT | Performed by: FAMILY MEDICINE

## 2019-06-13 RX ORDER — DOXYCYCLINE HYCLATE 100 MG/1
100 CAPSULE ORAL EVERY 12 HOURS SCHEDULED
Qty: 20 CAPSULE | Refills: 0 | Status: SHIPPED | OUTPATIENT
Start: 2019-06-13 | End: 2019-06-23

## 2019-07-09 ENCOUNTER — CLINICAL SUPPORT (OUTPATIENT)
Dept: FAMILY MEDICINE CLINIC | Facility: CLINIC | Age: 59
End: 2019-07-09
Payer: COMMERCIAL
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
## 2019-07-09 DIAGNOSIS — S30.860A TICK BITE OF BACK, INITIAL ENCOUNTER: Primary | ICD-10-CM

## 2019-07-09 PROCEDURE — 36415 COLL VENOUS BLD VENIPUNCTURE: CPT

## 2019-07-09 PROCEDURE — 99214 OFFICE O/P EST MOD 30 MIN: CPT

## 2019-07-10 LAB — B BURGDOR IGG+IGM SER-ACNC: <0.91 ISR (ref 0–0.9)

## 2019-08-01 ENCOUNTER — OFFICE VISIT (OUTPATIENT)
Dept: FAMILY MEDICINE CLINIC | Facility: CLINIC | Age: 59
End: 2019-08-01
Payer: COMMERCIAL

## 2019-08-01 VITALS
OXYGEN SATURATION: 97 % | RESPIRATION RATE: 16 BRPM | WEIGHT: 250.12 LBS | DIASTOLIC BLOOD PRESSURE: 82 MMHG | HEIGHT: 67 IN | HEART RATE: 98 BPM | BODY MASS INDEX: 39.26 KG/M2 | SYSTOLIC BLOOD PRESSURE: 146 MMHG | TEMPERATURE: 98.2 F

## 2019-08-01 DIAGNOSIS — M54.50 ACUTE BILATERAL LOW BACK PAIN WITHOUT SCIATICA: ICD-10-CM

## 2019-08-01 DIAGNOSIS — R41.3 MEMORY LOSS: ICD-10-CM

## 2019-08-01 DIAGNOSIS — C64.1 RENAL CELL CARCINOMA, RIGHT (HCC): ICD-10-CM

## 2019-08-01 DIAGNOSIS — M17.0 PRIMARY OSTEOARTHRITIS OF BOTH KNEES: Primary | ICD-10-CM

## 2019-08-01 DIAGNOSIS — Z85.46 HISTORY OF PROSTATE CANCER: ICD-10-CM

## 2019-08-01 PROCEDURE — 1036F TOBACCO NON-USER: CPT | Performed by: FAMILY MEDICINE

## 2019-08-01 PROCEDURE — 3008F BODY MASS INDEX DOCD: CPT | Performed by: FAMILY MEDICINE

## 2019-08-01 PROCEDURE — 99214 OFFICE O/P EST MOD 30 MIN: CPT | Performed by: FAMILY MEDICINE

## 2019-08-01 NOTE — PROGRESS NOTES
Subjective:   Chief Complaint   Patient presents with    Physical Exam     Patient presents for physical, patient has knee pain, left hip pain, lower back pain, patient has possible seborrheaic kertaosis on back of legs and arms and several other lesions wife is concerned about  Patient is aware that if it is not part of physical he will be billed for it  Patient ID: Oumar James is a 62 y o  male  Patient is here with a litany of complaints  He is noting bilateral knee pain  He gets stiffness that is particularly worse in the morning and loosens up after about 15 minutes  He has chronic low back pain which he has been experiencing over the last 2 years  During his previous workup for his low back pain it was incidentally found that he had renal cell carcinoma  He underwent nephrectomy and chemotherapy  He was then diagnosed with prostate cancer  He has had no further treatment for his low back pain  He previously was receiving epidural injections  He has been unable to exercise and as a result his weight has ballooned  His wife is concerned about his obesity  The following portions of the patient's history were reviewed and updated as appropriate: allergies, current medications, past family history, past medical history, past social history, past surgical history and problem list     Review of Systems   Constitutional: Negative for activity change, appetite change, chills, diaphoresis, fatigue and unexpected weight change  HENT: Negative for congestion, ear discharge, ear pain, hearing loss, nosebleeds and rhinorrhea  Eyes: Negative for pain, redness, itching and visual disturbance  Respiratory: Positive for shortness of breath  Negative for cough, choking and chest tightness  Cardiovascular: Negative for chest pain and leg swelling  Gastrointestinal: Positive for abdominal distention  Negative for abdominal pain, blood in stool, constipation, diarrhea and nausea  Endocrine: Negative for cold intolerance, polydipsia and polyphagia  Genitourinary: Positive for flank pain  Negative for dysuria, frequency, hematuria and urgency  Musculoskeletal: Positive for arthralgias and back pain  Negative for gait problem, joint swelling, neck pain and neck stiffness  Skin: Negative for color change and rash  Allergic/Immunologic: Negative for environmental allergies and food allergies  Neurological: Negative for dizziness, tremors, seizures, speech difficulty, numbness and headaches  Hematological: Negative for adenopathy  Does not bruise/bleed easily  Psychiatric/Behavioral: Positive for confusion  Negative for behavioral problems, dysphoric mood, hallucinations and self-injury  Memory loss         Objective:  Vitals:    08/01/19 1803   BP: 146/82   Pulse: 98   Resp: 16   Temp: 98 2 °F (36 8 °C)   SpO2: 97%   Weight: 113 kg (250 lb 1 9 oz)   Height: 5' 7" (1 702 m)      Physical Exam   Constitutional: He is oriented to person, place, and time  He appears well-developed and well-nourished  No distress  HENT:   Head: Normocephalic and atraumatic  Right Ear: External ear normal    Left Ear: External ear normal    Nose: Nose normal    Mouth/Throat: Oropharynx is clear and moist  No oropharyngeal exudate  Eyes: Pupils are equal, round, and reactive to light  Conjunctivae and EOM are normal  Right eye exhibits no discharge  Left eye exhibits no discharge  No scleral icterus  Neck: Normal range of motion  Neck supple  No thyromegaly present  Cardiovascular: Normal rate, regular rhythm and normal heart sounds  No murmur heard  Pulmonary/Chest: Effort normal and breath sounds normal  He has no wheezes  He has no rales  Abdominal: Soft  Bowel sounds are normal  He exhibits no mass  There is no tenderness  There is no guarding  Morbidly obese   Musculoskeletal: Normal range of motion  He exhibits tenderness  He exhibits no edema     Tender left PSIS and bilateral knees with crepitance   Lymphadenopathy:     He has no cervical adenopathy  Neurological: He is alert and oriented to person, place, and time  He has normal reflexes  Skin: Skin is warm and dry  He is not diaphoretic  Psychiatric: He has a normal mood and affect  Judgment and thought content normal          Assessment/Plan:    No problem-specific Assessment & Plan notes found for this encounter  Diagnoses and all orders for this visit:    Primary osteoarthritis of both knees  -     XR knee 3 vw left non injury; Future  -     XR knee 3 vw right non injury; Future    Renal cell carcinoma, right (HCC)    History of prostate cancer    Acute bilateral low back pain without sciatica  -     XR spine lumbar 2 or 3 views injury; Future  -     XR hip/pelv 2-3 vws left if performed; Future    Memory loss    BMI 39 0-39 9,adult          BMI Counseling: Body mass index is 39 17 kg/m²  Discussed the patient's BMI with him  The BMI is above average  BMI counseling and education was provided to the patient  Nutrition recommendations include reducing portion sizes and moderation in carbohydrate intake  Exercise recommendations include exercising 3-5 times per week  He will get x-rays of his bilateral knees and pelvis  He may need an orthopedics evaluation for injections  He is not a candidate for any anti-inflammatories due to his prior nephrectomy and compromised kidney function  He is referred to Neurology for evaluation of his memory loss  Weight loss is encouraged    He should see me after his imaging studies

## 2019-08-01 NOTE — PATIENT INSTRUCTIONS
Weight Management   AMBULATORY CARE:   Why it is important to manage your weight:  Being overweight increases your risk of health conditions such as heart disease, high blood pressure, type 2 diabetes, and certain types of cancer  It can also increase your risk for osteoarthritis, sleep apnea, and other respiratory problems  Aim for a slow, steady weight loss  Even a small amount of weight loss can lower your risk of health problems  How to lose weight safely:  A safe and healthy way to lose weight is to eat fewer calories and get regular exercise  You can lose up about 1 pound a week by decreasing the number of calories you eat by 500 calories each day  You can decrease calories by eating smaller portion sizes or by cutting out high-calorie foods  Read labels to find out how many calories are in the foods you eat  You can also burn calories with exercise such as walking, swimming, or biking  You will be more likely to keep weight off if you make these changes part of your lifestyle  Healthy meal plan for weight management:  A healthy meal plan includes a variety of foods, contains fewer calories, and helps you stay healthy  A healthy meal plan includes the following:  · Eat whole-grain foods more often  A healthy meal plan should contain fiber  Fiber is the part of grains, fruits, and vegetables that is not broken down by your body  Whole-grain foods are healthy and provide extra fiber in your diet  Some examples of whole-grain foods are whole-wheat breads and pastas, oatmeal, brown rice, and bulgur  · Eat a variety of vegetables every day  Include dark, leafy greens such as spinach, kale, angi greens, and mustard greens  Eat yellow and orange vegetables such as carrots, sweet potatoes, and winter squash  · Eat a variety of fruits every day  Choose fresh or canned fruit (canned in its own juice or light syrup) instead of juice  Fruit juice has very little or no fiber  · Eat low-fat dairy foods  Drink fat-free (skim) milk or 1% milk  Eat fat-free yogurt and low-fat cottage cheese  Try low-fat cheeses such as mozzarella and other reduced-fat cheeses  · Choose meat and other protein foods that are low in fat  Choose beans or other legumes such as split peas or lentils  Choose fish, skinless poultry (chicken or turkey), or lean cuts of red meat (beef or pork)  Before you cook meat or poultry, cut off any visible fat  · Use less fat and oil  Try baking foods instead of frying them  Add less fat, such as margarine, sour cream, regular salad dressing and mayonnaise to foods  Eat fewer high-fat foods  Some examples of high-fat foods include french fries, doughnuts, ice cream, and cakes  · Eat fewer sweets  Limit foods and drinks that are high in sugar  This includes candy, cookies, regular soda, and sweetened drinks  Ways to decrease calories:   · Eat smaller portions  ¨ Use a small plate with smaller servings  ¨ Do not eat second helpings  ¨ When you eat at a restaurant, ask for a box and place half of your meal in the box before you eat  ¨ Share an entrée with someone else  · Replace high-calorie snacks with healthy, low-calorie snacks  ¨ Choose fresh fruit, vegetables, fat-free rice cakes, or air-popped popcorn instead of potato chips, nuts, or chocolate  ¨ Choose water or calorie-free drinks instead of soda or sweetened drinks  · Eat regular meals  Skipping meals can lead to overeating later in the day  Eat a healthy snack in place of a meal if you do not have time to eat a regular meal      · Do not shop for groceries when you are hungry  You may be more likely to make unhealthy food choices  Take a grocery list of healthy foods and shop after you have eaten  Exercise:  Exercise at least 30 minutes per day on most days of the week  Some examples of exercise include walking, biking, dancing, and swimming   You can also fit in more physical activity by taking the stairs instead of the elevator or parking farther away from stores  Ask your healthcare provider about the best exercise plan for you  Other things to consider as you try to lose weight:   · Be aware of situations that may give you the urge to overeat, such as eating while watching television  Find ways to avoid these situations  For example, read a book, go for a walk, or do crafts  · Meet with a weight loss support group or friends who are also trying to lose weight  This may help you stay motivated to continue working on your weight loss goals  © 2017 [facility] Information is for End User's use only and may not be sold, redistributed or otherwise used for commercial purposes  All illustrations and images included in CareNotes® are the copyrighted property of [employer]  or [employer]  The above information is an  only  It is not intended as medical advice for individual conditions or treatments  Talk to your doctor, nurse or pharmacist before following any medical regimen to see if it is safe and effective for you  Osteoarthritis   WHAT YOU NEED TO KNOW:   Osteoarthritis (OA) occurs when cartilage (tissue that cushions a joint) wears away slowly and causes the bones to rub together  OA is a long-term condition that often affects the hands, neck, lower back, knees, and hips  OA is also called arthrosis or degenerative joint disease  DISCHARGE INSTRUCTIONS:   Return to the emergency department if:   · You have severe pain  · You cannot move your joint  Contact your healthcare provider if:   · You have a fever  · Your joint is red and tender  · You have questions or concerns about your condition or care  Medicines:   · Acetaminophen  is used to decrease pain  It is available without a doctor's order  Ask how much to take and how often to take it  Follow directions  Acetaminophen can cause liver damage if not taken correctly      · NSAIDs , such as ibuprofen, help decrease swelling, pain, and fever  This medicine is available with or without a doctor's order  NSAIDs can cause stomach bleeding or kidney problems in certain people  If you take blood thinner medicine, always ask your healthcare provider if NSAIDs are safe for you  Always read the medicine label and follow directions  · Prescription pain medicine  may be given to decrease severe pain if other medicines do not work  Take the medicine as directed  Do not wait until the pain is severe before you take your medicine  · Take your medicine as directed  Contact your healthcare provider if you think your medicine is not helping or if you have side effects  Tell him or her if you are allergic to any medicine  Keep a list of the medicines, vitamins, and herbs you take  Include the amounts, and when and why you take them  Bring the list or the pill bottles to follow-up visits  Carry your medicine list with you in case of an emergency  Follow up with your healthcare provider as directed:  Write down your questions so you remember to ask them during your visits  Go to physical therapy as directed:  A physical therapist teaches you exercises to help improve movement and strength, and to decrease pain in your joints  The exercises also help lower your risk for loss of function  Manage your OA:   · Stay active  Physical activity may reduce your pain and improve your ability to do daily activities  Avoid activities that cause pain  Ask your healthcare provider what type of exercise would be best for you  · Maintain a healthy weight  This helps decrease the strain on the joints in your back, hips, knees, ankles, and feet  Ask your healthcare provider how much you should weigh  Ask him to help you create a weight loss plan if you are overweight  · Use heat or ice  on your joints as directed  Heat and ice help decrease pain, swelling, and muscle spasms   Use a heating pad on a low setting or take a warm bath  Use an ice pack, or put crushed ice in a plastic bag  Cover it with a towel  · Massage  the muscles around the joint to relieve pain and stiffness  · Use a cane, crutches, or a walker  to protect and relieve pressure on your ankle, knee, and hip joints  You may also be prescribed shoe inserts to decrease pressure in your joints  · Wear flat or low-heeled shoes  This will help decrease pain and reduce pressure on your ankle, knee, and hip joints  © 2017 [facility] Information is for End User's use only and may not be sold, redistributed or otherwise used for commercial purposes  All illustrations and images included in CareNotes® are the copyrighted property of [employer]  or [employer]  The above information is an  only  It is not intended as medical advice for individual conditions or treatments  Talk to your doctor, nurse or pharmacist before following any medical regimen to see if it is safe and effective for you

## 2019-08-05 ENCOUNTER — HOSPITAL ENCOUNTER (OUTPATIENT)
Dept: RADIOLOGY | Facility: HOSPITAL | Age: 59
Discharge: HOME | End: 2019-08-05
Payer: COMMERCIAL

## 2019-08-05 DIAGNOSIS — M54.50 ACUTE BILATERAL LOW BACK PAIN WITHOUT SCIATICA: ICD-10-CM

## 2019-08-05 DIAGNOSIS — M17.0 PRIMARY OSTEOARTHRITIS OF BOTH KNEES: ICD-10-CM

## 2019-08-05 PROCEDURE — 73562 X-RAY EXAM OF KNEE 3: CPT

## 2019-08-05 PROCEDURE — 72100 X-RAY EXAM L-S SPINE 2/3 VWS: CPT

## 2019-08-05 PROCEDURE — 73502 X-RAY EXAM HIP UNI 2-3 VIEWS: CPT

## 2019-08-09 ENCOUNTER — TELEPHONE (OUTPATIENT)
Dept: FAMILY MEDICINE CLINIC | Facility: CLINIC | Age: 59
End: 2019-08-09

## 2019-08-12 ENCOUNTER — TELEPHONE (OUTPATIENT)
Dept: FAMILY MEDICINE CLINIC | Facility: CLINIC | Age: 59
End: 2019-08-12

## 2019-08-12 NOTE — TELEPHONE ENCOUNTER
X-rays show mild arthritis in the left hip  The knees appear normal on x-ray    He should be seen by Ortho for the next step

## 2019-09-17 ENCOUNTER — TRANSCRIBE ORDERS (OUTPATIENT)
Dept: SLEEP CENTER | Facility: CLINIC | Age: 59
End: 2019-09-17

## 2019-09-17 DIAGNOSIS — G47.33 OBSTRUCTIVE SLEEP APNEA: Primary | ICD-10-CM

## 2019-09-17 DIAGNOSIS — E66.9 OBESITY: ICD-10-CM

## 2019-09-17 DIAGNOSIS — Z99.89 DEPENDENCE ON OTHER ENABLING MACHINES AND DEVICES: ICD-10-CM

## 2019-09-17 DIAGNOSIS — G47.61 PERIODIC LIMB MOVEMENT DISORDER: ICD-10-CM

## 2019-10-24 ENCOUNTER — DOCUMENTATION (OUTPATIENT)
Dept: FAMILY MEDICINE CLINIC | Facility: CLINIC | Age: 59
End: 2019-10-24

## 2019-10-24 NOTE — PROGRESS NOTES
Patient notified that we cannot do a PA for [facility] by phone message  The CT was not ordered by us

## 2019-10-31 ENCOUNTER — TRANSCRIBE ORDERS (OUTPATIENT)
Dept: ADMINISTRATIVE | Facility: HOSPITAL | Age: 59
End: 2019-10-31

## 2019-10-31 ENCOUNTER — HOSPITAL ENCOUNTER (OUTPATIENT)
Dept: RADIOLOGY | Facility: HOSPITAL | Age: 59
Discharge: HOME | End: 2019-10-31
Payer: COMMERCIAL

## 2019-10-31 DIAGNOSIS — G40.909 SEIZURE DISORDER (HCC): ICD-10-CM

## 2019-10-31 DIAGNOSIS — C64.1 MALIGNANT NEOPLASM OF RIGHT KIDNEY, EXCEPT RENAL PELVIS (HCC): ICD-10-CM

## 2019-10-31 DIAGNOSIS — C64.9 CARCINOMA OF KIDNEY, UNSPECIFIED LATERALITY (HCC): Primary | ICD-10-CM

## 2019-10-31 DIAGNOSIS — C64.1 MALIGNANT NEOPLASM OF RIGHT KIDNEY, EXCEPT RENAL PELVIS (HCC): Primary | ICD-10-CM

## 2019-10-31 PROCEDURE — 71046 X-RAY EXAM CHEST 2 VIEWS: CPT

## 2019-10-31 RX ORDER — PHENYTOIN SODIUM 100 MG/1
CAPSULE, EXTENDED RELEASE ORAL
Qty: 90 CAPSULE | Refills: 3 | Status: SHIPPED | OUTPATIENT
Start: 2019-10-31 | End: 2019-12-02 | Stop reason: SDUPTHER

## 2019-11-05 ENCOUNTER — HOSPITAL ENCOUNTER (OUTPATIENT)
Dept: CT IMAGING | Facility: HOSPITAL | Age: 59
Discharge: HOME | End: 2019-11-05
Payer: COMMERCIAL

## 2019-11-05 DIAGNOSIS — C64.9 CARCINOMA OF KIDNEY, UNSPECIFIED LATERALITY (HCC): ICD-10-CM

## 2019-11-05 PROCEDURE — 74150 CT ABDOMEN W/O CONTRAST: CPT

## 2019-11-20 ENCOUNTER — TELEPHONE (OUTPATIENT)
Dept: SLEEP CENTER | Facility: CLINIC | Age: 59
End: 2019-11-20

## 2019-12-02 DIAGNOSIS — G40.909 SEIZURE DISORDER (HCC): ICD-10-CM

## 2019-12-02 RX ORDER — PHENYTOIN SODIUM 100 MG/1
100 CAPSULE, EXTENDED RELEASE ORAL 3 TIMES DAILY
Qty: 90 CAPSULE | Refills: 3 | Status: SHIPPED | OUTPATIENT
Start: 2019-12-02 | End: 2020-02-27

## 2020-02-07 ENCOUNTER — TRANSCRIBE ORDERS (OUTPATIENT)
Dept: SLEEP CENTER | Facility: CLINIC | Age: 60
End: 2020-02-07

## 2020-02-07 DIAGNOSIS — E66.9 OBESITY, UNSPECIFIED: ICD-10-CM

## 2020-02-07 DIAGNOSIS — Z99.89 DEPENDENCE ON OTHER ENABLING MACHINES AND DEVICES: ICD-10-CM

## 2020-02-07 DIAGNOSIS — G47.33 OBSTRUCTIVE SLEEP APNEA (ADULT) (PEDIATRIC): Primary | ICD-10-CM

## 2020-02-07 DIAGNOSIS — G47.61 PERIODIC LIMB MOVEMENT DISORDER: ICD-10-CM

## 2020-02-14 ENCOUNTER — TELEPHONE (OUTPATIENT)
Dept: FAMILY MEDICINE CLINIC | Facility: CLINIC | Age: 60
End: 2020-02-14

## 2020-02-27 ENCOUNTER — OFFICE VISIT (OUTPATIENT)
Dept: FAMILY MEDICINE CLINIC | Facility: CLINIC | Age: 60
End: 2020-02-27
Payer: COMMERCIAL

## 2020-02-27 VITALS
TEMPERATURE: 97.9 F | BODY MASS INDEX: 40.22 KG/M2 | WEIGHT: 256.25 LBS | DIASTOLIC BLOOD PRESSURE: 88 MMHG | SYSTOLIC BLOOD PRESSURE: 122 MMHG | HEIGHT: 67 IN | HEART RATE: 73 BPM | OXYGEN SATURATION: 93 %

## 2020-02-27 DIAGNOSIS — Z13.0 SCREENING FOR DEFICIENCY ANEMIA: ICD-10-CM

## 2020-02-27 DIAGNOSIS — Z12.11 COLON CANCER SCREENING: ICD-10-CM

## 2020-02-27 DIAGNOSIS — C64.1 RENAL CELL CARCINOMA, RIGHT (HCC): ICD-10-CM

## 2020-02-27 DIAGNOSIS — S83.206A ACUTE TORN MENISCUS OF KNEE, RIGHT, INITIAL ENCOUNTER: Primary | ICD-10-CM

## 2020-02-27 DIAGNOSIS — E78.2 MIXED HYPERLIPIDEMIA: ICD-10-CM

## 2020-02-27 DIAGNOSIS — Z13.21 SCREENING FOR ENDOCRINE, NUTRITIONAL, METABOLIC AND IMMUNITY DISORDER: ICD-10-CM

## 2020-02-27 DIAGNOSIS — Z13.29 SCREENING FOR THYROID DISORDER: ICD-10-CM

## 2020-02-27 DIAGNOSIS — Z13.228 SCREENING FOR ENDOCRINE, NUTRITIONAL, METABOLIC AND IMMUNITY DISORDER: ICD-10-CM

## 2020-02-27 DIAGNOSIS — Z85.46 HISTORY OF PROSTATE CANCER: ICD-10-CM

## 2020-02-27 DIAGNOSIS — N18.30 CKD (CHRONIC KIDNEY DISEASE) STAGE 3, GFR 30-59 ML/MIN (HCC): ICD-10-CM

## 2020-02-27 DIAGNOSIS — K63.5 POLYP OF SIGMOID COLON, UNSPECIFIED TYPE: ICD-10-CM

## 2020-02-27 DIAGNOSIS — Z13.0 SCREENING FOR ENDOCRINE, NUTRITIONAL, METABOLIC AND IMMUNITY DISORDER: ICD-10-CM

## 2020-02-27 DIAGNOSIS — R41.3 MEMORY LOSS: ICD-10-CM

## 2020-02-27 DIAGNOSIS — Z13.29 SCREENING FOR ENDOCRINE, NUTRITIONAL, METABOLIC AND IMMUNITY DISORDER: ICD-10-CM

## 2020-02-27 PROCEDURE — 3079F DIAST BP 80-89 MM HG: CPT | Performed by: FAMILY MEDICINE

## 2020-02-27 PROCEDURE — 3074F SYST BP LT 130 MM HG: CPT | Performed by: FAMILY MEDICINE

## 2020-02-27 PROCEDURE — 3008F BODY MASS INDEX DOCD: CPT | Performed by: FAMILY MEDICINE

## 2020-02-27 PROCEDURE — 99214 OFFICE O/P EST MOD 30 MIN: CPT | Performed by: FAMILY MEDICINE

## 2020-02-27 PROCEDURE — 1036F TOBACCO NON-USER: CPT | Performed by: FAMILY MEDICINE

## 2020-02-27 RX ORDER — PHENYTOIN SODIUM 100 MG/1
CAPSULE, EXTENDED RELEASE ORAL
COMMUNITY
Start: 2020-01-31 | End: 2020-04-13

## 2020-02-27 NOTE — PATIENT INSTRUCTIONS
Meniscus Tear   WHAT YOU NEED TO KNOW:   A meniscus tear is a tear in the cartilage of your knee  The meniscus is a piece of cartilage (strong tissue) between your thighbone and shinbone  The meniscus helps to cushion your knee joint and keep it stable  DISCHARGE INSTRUCTIONS:   Call 911 for any of the following:   · Your arm or leg feels warm, tender, and painful  It may look swollen and red  Return to the emergency department if:   · You cannot move your knee at all  Contact your healthcare provider if:   · Your symptoms do not improve with treatment  · You have questions or concerns about your condition or care  Medicines:   · NSAIDs , such as ibuprofen, help decrease swelling, pain, and fever  This medicine is available with or without a doctor's order  NSAIDs can cause stomach bleeding or kidney problems in certain people  If you take blood thinner medicine, always ask if NSAIDs are safe for you  Always read the medicine label and follow directions  Do not give these medicines to children under 10months of age without direction from your child's healthcare provider  · Take your medicine as directed  Contact your healthcare provider if you think your medicine is not helping or if you have side effects  Tell him of her if you are allergic to any medicine  Keep a list of the medicines, vitamins, and herbs you take  Include the amounts, and when and why you take them  Bring the list or the pill bottles to follow-up visits  Carry your medicine list with you in case of an emergency  Follow up with your healthcare provider as directed:  Write down your questions so you remember to ask them during your visits  Self-care:   · Rest  your knee  Avoid activities that make the swelling or pain worse  You may need to avoid putting weight on your leg while you have pain  Your healthcare provider may recommend that you use crutches      · Apply ice  on your knee for 15 to 20 minutes every hour or as directed  Use an ice pack, or put crushed ice in a plastic bag  Cover it with a towel  Ice helps prevent tissue damage and decreases swelling and pain  · Compress  your knee with an elastic bandage, air cast, medical boot, or splint to reduce swelling  Ask your healthcare provider which compression device to use, and how tight it should be  · Elevate  your knee above the level of your heart as often as you can  This will help decrease swelling and pain  Prop your knee on pillows or blankets to keep it elevated comfortably  © 2017 [facility] Information is for End User's use only and may not be sold, redistributed or otherwise used for commercial purposes  All illustrations and images included in CareNotes® are the copyrighted property of [employer]  or [employer] Mcwilli[employer]  The above information is an  only  It is not intended as medical advice for individual conditions or treatments  Talk to your doctor, nurse or pharmacist before following any medical regimen to see if it is safe and effective for you

## 2020-03-11 ENCOUNTER — TELEPHONE (OUTPATIENT)
Dept: FAMILY MEDICINE CLINIC | Facility: CLINIC | Age: 60
End: 2020-03-11

## 2020-03-12 ENCOUNTER — HOSPITAL ENCOUNTER (OUTPATIENT)
Dept: MRI IMAGING | Facility: HOSPITAL | Age: 60
Discharge: HOME | End: 2020-03-12
Payer: COMMERCIAL

## 2020-03-12 DIAGNOSIS — S83.206A ACUTE TORN MENISCUS OF KNEE, RIGHT, INITIAL ENCOUNTER: ICD-10-CM

## 2020-03-12 PROCEDURE — 73721 MRI JNT OF LWR EXTRE W/O DYE: CPT

## 2020-03-18 ENCOUNTER — TELEPHONE (OUTPATIENT)
Dept: FAMILY MEDICINE CLINIC | Facility: CLINIC | Age: 60
End: 2020-03-18

## 2020-03-18 NOTE — TELEPHONE ENCOUNTER
What was the name of the ortho you want him to see and should he hold off with the virus or is it ok to schedule  [phone] OK to leave message

## 2020-04-12 DIAGNOSIS — G40.909 SEIZURE DISORDER (HCC): Primary | ICD-10-CM

## 2020-04-13 RX ORDER — PHENYTOIN SODIUM 100 MG/1
CAPSULE, EXTENDED RELEASE ORAL
Qty: 90 CAPSULE | Refills: 3 | Status: SHIPPED | OUTPATIENT
Start: 2020-04-13 | End: 2020-09-24

## 2020-05-20 ENCOUNTER — OFFICE VISIT (OUTPATIENT)
Dept: OBGYN CLINIC | Facility: CLINIC | Age: 60
End: 2020-05-20
Payer: COMMERCIAL

## 2020-05-20 VITALS
SYSTOLIC BLOOD PRESSURE: 130 MMHG | BODY MASS INDEX: 39.08 KG/M2 | HEART RATE: 73 BPM | HEIGHT: 67 IN | WEIGHT: 249 LBS | DIASTOLIC BLOOD PRESSURE: 84 MMHG | TEMPERATURE: 98.4 F

## 2020-05-20 DIAGNOSIS — M25.561 RIGHT KNEE PAIN, UNSPECIFIED CHRONICITY: ICD-10-CM

## 2020-05-20 DIAGNOSIS — S83.241A OTHER TEAR OF MEDIAL MENISCUS, CURRENT INJURY, RIGHT KNEE, INITIAL ENCOUNTER: Primary | ICD-10-CM

## 2020-05-20 DIAGNOSIS — S83.241A OTHER TEAR OF MEDIAL MENISCUS, CURRENT INJURY, RIGHT KNEE, INITIAL ENCOUNTER: ICD-10-CM

## 2020-05-20 DIAGNOSIS — Z01.812 PRE-OPERATIVE LABORATORY EXAMINATION: Primary | ICD-10-CM

## 2020-05-20 PROCEDURE — 3079F DIAST BP 80-89 MM HG: CPT | Performed by: ORTHOPAEDIC SURGERY

## 2020-05-20 PROCEDURE — 3075F SYST BP GE 130 - 139MM HG: CPT | Performed by: ORTHOPAEDIC SURGERY

## 2020-05-20 PROCEDURE — 99204 OFFICE O/P NEW MOD 45 MIN: CPT | Performed by: ORTHOPAEDIC SURGERY

## 2020-05-20 PROCEDURE — 1036F TOBACCO NON-USER: CPT | Performed by: ORTHOPAEDIC SURGERY

## 2020-05-20 PROCEDURE — 3008F BODY MASS INDEX DOCD: CPT | Performed by: ORTHOPAEDIC SURGERY

## 2020-05-20 RX ORDER — TRAMADOL HYDROCHLORIDE 50 MG/1
50 TABLET ORAL EVERY 6 HOURS SCHEDULED
Status: CANCELLED | OUTPATIENT
Start: 2020-05-20

## 2020-05-20 RX ORDER — ACETAMINOPHEN 325 MG/1
650 TABLET ORAL EVERY 6 HOURS PRN
Status: CANCELLED | OUTPATIENT
Start: 2020-05-20

## 2020-05-20 RX ORDER — CEFAZOLIN SODIUM 2 G/50ML
2000 SOLUTION INTRAVENOUS ONCE
Status: CANCELLED | OUTPATIENT
Start: 2020-05-27 | End: 2020-05-20

## 2020-05-21 ENCOUNTER — OFFICE VISIT (OUTPATIENT)
Dept: LAB | Facility: HOSPITAL | Age: 60
End: 2020-05-21
Attending: ORTHOPAEDIC SURGERY
Payer: COMMERCIAL

## 2020-05-21 DIAGNOSIS — S83.241A OTHER TEAR OF MEDIAL MENISCUS, CURRENT INJURY, RIGHT KNEE, INITIAL ENCOUNTER: ICD-10-CM

## 2020-05-21 DIAGNOSIS — Z01.812 PRE-OPERATIVE LABORATORY EXAMINATION: ICD-10-CM

## 2020-05-21 LAB
ATRIAL RATE: 76 BPM
P AXIS: 10 DEGREES
PR INTERVAL: 144 MS
QRS AXIS: -6 DEGREES
QRSD INTERVAL: 86 MS
QT INTERVAL: 366 MS
QTC INTERVAL: 411 MS
T WAVE AXIS: -1 DEGREES
VENTRICULAR RATE: 76 BPM

## 2020-05-21 PROCEDURE — 93005 ELECTROCARDIOGRAM TRACING: CPT

## 2020-05-21 PROCEDURE — U0003 INFECTIOUS AGENT DETECTION BY NUCLEIC ACID (DNA OR RNA); SEVERE ACUTE RESPIRATORY SYNDROME CORONAVIRUS 2 (SARS-COV-2) (CORONAVIRUS DISEASE [COVID-19]), AMPLIFIED PROBE TECHNIQUE, MAKING USE OF HIGH THROUGHPUT TECHNOLOGIES AS DESCRIBED BY CMS-2020-01-R: HCPCS

## 2020-05-21 PROCEDURE — 93010 ELECTROCARDIOGRAM REPORT: CPT | Performed by: INTERNAL MEDICINE

## 2020-05-22 ENCOUNTER — ANESTHESIA EVENT (OUTPATIENT)
Dept: PERIOP | Facility: HOSPITAL | Age: 60
End: 2020-05-22
Payer: COMMERCIAL

## 2020-05-23 LAB — SARS-COV-2 RNA SPEC QL NAA+PROBE: NOT DETECTED

## 2020-05-27 ENCOUNTER — HOSPITAL ENCOUNTER (OUTPATIENT)
Facility: HOSPITAL | Age: 60
Setting detail: OUTPATIENT SURGERY
Discharge: HOME | End: 2020-05-27
Attending: ORTHOPAEDIC SURGERY | Admitting: ORTHOPAEDIC SURGERY
Payer: COMMERCIAL

## 2020-05-27 ENCOUNTER — ANESTHESIA (OUTPATIENT)
Dept: PERIOP | Facility: HOSPITAL | Age: 60
End: 2020-05-27
Payer: COMMERCIAL

## 2020-05-27 VITALS
OXYGEN SATURATION: 98 % | DIASTOLIC BLOOD PRESSURE: 71 MMHG | HEART RATE: 61 BPM | HEIGHT: 67 IN | BODY MASS INDEX: 39.9 KG/M2 | SYSTOLIC BLOOD PRESSURE: 117 MMHG | TEMPERATURE: 97 F | WEIGHT: 254.2 LBS | RESPIRATION RATE: 16 BRPM

## 2020-05-27 DIAGNOSIS — S83.232A COMPLEX TEAR OF MEDIAL MENISCUS OF LEFT KNEE AS CURRENT INJURY, INITIAL ENCOUNTER: Primary | ICD-10-CM

## 2020-05-27 PROCEDURE — 29881 ARTHRS KNE SRG MNISECTMY M/L: CPT | Performed by: PHYSICIAN ASSISTANT

## 2020-05-27 PROCEDURE — 29881 ARTHRS KNE SRG MNISECTMY M/L: CPT | Performed by: ORTHOPAEDIC SURGERY

## 2020-05-27 RX ORDER — METOCLOPRAMIDE HYDROCHLORIDE 5 MG/ML
INJECTION INTRAMUSCULAR; INTRAVENOUS AS NEEDED
Status: DISCONTINUED | OUTPATIENT
Start: 2020-05-27 | End: 2020-05-27 | Stop reason: SURG

## 2020-05-27 RX ORDER — HYDROMORPHONE HCL/PF 1 MG/ML
0.5 SYRINGE (ML) INJECTION
Status: DISCONTINUED | OUTPATIENT
Start: 2020-05-27 | End: 2020-05-27 | Stop reason: HOSPADM

## 2020-05-27 RX ORDER — CEFAZOLIN SODIUM 2 G/50ML
2000 SOLUTION INTRAVENOUS ONCE
Status: COMPLETED | OUTPATIENT
Start: 2020-05-27 | End: 2020-05-27

## 2020-05-27 RX ORDER — ONDANSETRON 2 MG/ML
INJECTION INTRAMUSCULAR; INTRAVENOUS AS NEEDED
Status: DISCONTINUED | OUTPATIENT
Start: 2020-05-27 | End: 2020-05-27 | Stop reason: SURG

## 2020-05-27 RX ORDER — FENTANYL CITRATE 50 UG/ML
INJECTION, SOLUTION INTRAMUSCULAR; INTRAVENOUS AS NEEDED
Status: DISCONTINUED | OUTPATIENT
Start: 2020-05-27 | End: 2020-05-27 | Stop reason: SURG

## 2020-05-27 RX ORDER — FENTANYL CITRATE/PF 50 MCG/ML
25 SYRINGE (ML) INJECTION
Status: DISCONTINUED | OUTPATIENT
Start: 2020-05-27 | End: 2020-05-27 | Stop reason: HOSPADM

## 2020-05-27 RX ORDER — ACETAMINOPHEN 325 MG/1
650 TABLET ORAL EVERY 6 HOURS PRN
Status: DISCONTINUED | OUTPATIENT
Start: 2020-05-27 | End: 2020-05-27 | Stop reason: HOSPADM

## 2020-05-27 RX ORDER — SCOLOPAMINE TRANSDERMAL SYSTEM 1 MG/1
1 PATCH, EXTENDED RELEASE TRANSDERMAL
Status: DISCONTINUED | OUTPATIENT
Start: 2020-05-27 | End: 2020-05-27 | Stop reason: HOSPADM

## 2020-05-27 RX ORDER — PROPOFOL 10 MG/ML
INJECTION, EMULSION INTRAVENOUS AS NEEDED
Status: DISCONTINUED | OUTPATIENT
Start: 2020-05-27 | End: 2020-05-27 | Stop reason: SURG

## 2020-05-27 RX ORDER — ONDANSETRON 2 MG/ML
4 INJECTION INTRAMUSCULAR; INTRAVENOUS ONCE
Status: COMPLETED | OUTPATIENT
Start: 2020-05-27 | End: 2020-05-27

## 2020-05-27 RX ORDER — GLYCOPYRROLATE 0.2 MG/ML
INJECTION INTRAMUSCULAR; INTRAVENOUS AS NEEDED
Status: DISCONTINUED | OUTPATIENT
Start: 2020-05-27 | End: 2020-05-27 | Stop reason: SURG

## 2020-05-27 RX ORDER — OXYCODONE HYDROCHLORIDE 5 MG/1
5 TABLET ORAL EVERY 4 HOURS PRN
Qty: 10 TABLET | Refills: 0 | Status: SHIPPED | OUTPATIENT
Start: 2020-05-27 | End: 2020-12-15

## 2020-05-27 RX ORDER — TRAMADOL HYDROCHLORIDE 50 MG/1
50 TABLET ORAL EVERY 6 HOURS SCHEDULED
Status: DISCONTINUED | OUTPATIENT
Start: 2020-05-27 | End: 2020-05-27 | Stop reason: HOSPADM

## 2020-05-27 RX ORDER — MIDAZOLAM HYDROCHLORIDE 2 MG/2ML
INJECTION, SOLUTION INTRAMUSCULAR; INTRAVENOUS AS NEEDED
Status: DISCONTINUED | OUTPATIENT
Start: 2020-05-27 | End: 2020-05-27 | Stop reason: SURG

## 2020-05-27 RX ORDER — METOCLOPRAMIDE HYDROCHLORIDE 5 MG/ML
10 INJECTION INTRAMUSCULAR; INTRAVENOUS ONCE AS NEEDED
Status: DISCONTINUED | OUTPATIENT
Start: 2020-05-27 | End: 2020-05-27 | Stop reason: HOSPADM

## 2020-05-27 RX ORDER — SODIUM CHLORIDE, SODIUM LACTATE, POTASSIUM CHLORIDE, CALCIUM CHLORIDE 600; 310; 30; 20 MG/100ML; MG/100ML; MG/100ML; MG/100ML
75 INJECTION, SOLUTION INTRAVENOUS CONTINUOUS
Status: DISCONTINUED | OUTPATIENT
Start: 2020-05-27 | End: 2020-05-27 | Stop reason: HOSPADM

## 2020-05-27 RX ADMIN — SODIUM CHLORIDE, SODIUM LACTATE, POTASSIUM CHLORIDE, AND CALCIUM CHLORIDE: .6; .31; .03; .02 INJECTION, SOLUTION INTRAVENOUS at 07:24

## 2020-05-27 RX ADMIN — CEFAZOLIN SODIUM 2000 MG: 2 SOLUTION INTRAVENOUS at 07:25

## 2020-05-27 RX ADMIN — SODIUM CHLORIDE, SODIUM LACTATE, POTASSIUM CHLORIDE, AND CALCIUM CHLORIDE 75 ML/HR: .6; .31; .03; .02 INJECTION, SOLUTION INTRAVENOUS at 06:33

## 2020-05-27 RX ADMIN — GLYCOPYRROLATE 0.1 MG: 0.2 INJECTION, SOLUTION INTRAMUSCULAR; INTRAVENOUS at 08:00

## 2020-05-27 RX ADMIN — METOCLOPRAMIDE HYDROCHLORIDE 10 MG: 5 INJECTION INTRAMUSCULAR; INTRAVENOUS at 07:45

## 2020-05-27 RX ADMIN — FENTANYL CITRATE 50 MCG: 50 INJECTION, SOLUTION INTRAMUSCULAR; INTRAVENOUS at 07:24

## 2020-05-27 RX ADMIN — MIDAZOLAM 1 MG: 1 INJECTION INTRAMUSCULAR; INTRAVENOUS at 07:26

## 2020-05-27 RX ADMIN — MIDAZOLAM 1 MG: 1 INJECTION INTRAMUSCULAR; INTRAVENOUS at 07:24

## 2020-05-27 RX ADMIN — FENTANYL CITRATE 25 MCG: 50 INJECTION, SOLUTION INTRAMUSCULAR; INTRAVENOUS at 07:38

## 2020-05-27 RX ADMIN — ONDANSETRON 4 MG: 2 INJECTION INTRAMUSCULAR; INTRAVENOUS at 07:39

## 2020-05-27 RX ADMIN — PROPOFOL 200 MG: 10 INJECTION, EMULSION INTRAVENOUS at 07:28

## 2020-05-27 RX ADMIN — FENTANYL CITRATE 25 MCG: 50 INJECTION, SOLUTION INTRAMUSCULAR; INTRAVENOUS at 07:55

## 2020-05-27 RX ADMIN — ONDANSETRON 4 MG: 2 INJECTION INTRAMUSCULAR; INTRAVENOUS at 08:48

## 2020-05-27 RX ADMIN — TRAMADOL HYDROCHLORIDE 50 MG: 50 TABLET, FILM COATED ORAL at 08:57

## 2020-05-27 RX ADMIN — SCOPALAMINE 1 PATCH: 1 PATCH, EXTENDED RELEASE TRANSDERMAL at 07:15

## 2020-05-29 ENCOUNTER — EVALUATION (OUTPATIENT)
Dept: PHYSICAL THERAPY | Facility: CLINIC | Age: 60
End: 2020-05-29
Payer: COMMERCIAL

## 2020-05-29 DIAGNOSIS — S83.241A OTHER TEAR OF MEDIAL MENISCUS, CURRENT INJURY, RIGHT KNEE, INITIAL ENCOUNTER: ICD-10-CM

## 2020-05-29 PROCEDURE — 97110 THERAPEUTIC EXERCISES: CPT | Performed by: PHYSICAL THERAPIST

## 2020-05-29 PROCEDURE — 97162 PT EVAL MOD COMPLEX 30 MIN: CPT | Performed by: PHYSICAL THERAPIST

## 2020-06-02 ENCOUNTER — OFFICE VISIT (OUTPATIENT)
Dept: PHYSICAL THERAPY | Facility: CLINIC | Age: 60
End: 2020-06-02
Payer: COMMERCIAL

## 2020-06-02 DIAGNOSIS — S83.241A OTHER TEAR OF MEDIAL MENISCUS, CURRENT INJURY, RIGHT KNEE, INITIAL ENCOUNTER: Primary | ICD-10-CM

## 2020-06-02 PROCEDURE — 97140 MANUAL THERAPY 1/> REGIONS: CPT | Performed by: PHYSICAL THERAPIST

## 2020-06-02 PROCEDURE — 97112 NEUROMUSCULAR REEDUCATION: CPT | Performed by: PHYSICAL THERAPIST

## 2020-06-02 PROCEDURE — 97110 THERAPEUTIC EXERCISES: CPT | Performed by: PHYSICAL THERAPIST

## 2020-06-04 ENCOUNTER — OFFICE VISIT (OUTPATIENT)
Dept: PHYSICAL THERAPY | Facility: CLINIC | Age: 60
End: 2020-06-04
Payer: COMMERCIAL

## 2020-06-04 ENCOUNTER — APPOINTMENT (EMERGENCY)
Dept: RADIOLOGY | Facility: HOSPITAL | Age: 60
End: 2020-06-04
Payer: COMMERCIAL

## 2020-06-04 ENCOUNTER — HOSPITAL ENCOUNTER (EMERGENCY)
Facility: HOSPITAL | Age: 60
Discharge: HOME | End: 2020-06-04
Attending: EMERGENCY MEDICINE
Payer: COMMERCIAL

## 2020-06-04 VITALS
SYSTOLIC BLOOD PRESSURE: 173 MMHG | HEART RATE: 69 BPM | DIASTOLIC BLOOD PRESSURE: 93 MMHG | OXYGEN SATURATION: 92 % | RESPIRATION RATE: 20 BRPM

## 2020-06-04 DIAGNOSIS — R03.0 ELEVATED BLOOD PRESSURE READING: ICD-10-CM

## 2020-06-04 DIAGNOSIS — R06.02 SHORTNESS OF BREATH: Primary | ICD-10-CM

## 2020-06-04 DIAGNOSIS — S83.241A OTHER TEAR OF MEDIAL MENISCUS, CURRENT INJURY, RIGHT KNEE, INITIAL ENCOUNTER: Primary | ICD-10-CM

## 2020-06-04 LAB
ANION GAP SERPL CALCULATED.3IONS-SCNC: 5 MMOL/L (ref 4–13)
APTT PPP: 34 SECONDS (ref 23–37)
ATRIAL RATE: 60 BPM
ATRIAL RATE: 66 BPM
BASOPHILS # BLD AUTO: 0.05 THOUSANDS/ΜL (ref 0–0.1)
BASOPHILS NFR BLD AUTO: 1 % (ref 0–1)
BUN SERPL-MCNC: 22 MG/DL (ref 5–25)
CALCIUM SERPL-MCNC: 8.3 MG/DL (ref 8.3–10.1)
CHLORIDE SERPL-SCNC: 105 MMOL/L (ref 100–108)
CO2 SERPL-SCNC: 26 MMOL/L (ref 21–32)
CREAT SERPL-MCNC: 1.5 MG/DL (ref 0.6–1.3)
D DIMER PPP FEU-MCNC: 0.39 UG/ML FEU
EOSINOPHIL # BLD AUTO: 0.3 THOUSAND/ΜL (ref 0–0.61)
EOSINOPHIL NFR BLD AUTO: 4 % (ref 0–6)
ERYTHROCYTE [DISTWIDTH] IN BLOOD BY AUTOMATED COUNT: 13.2 % (ref 11.6–15.1)
GFR SERPL CREATININE-BSD FRML MDRD: 50 ML/MIN/1.73SQ M
GLUCOSE SERPL-MCNC: 118 MG/DL (ref 65–140)
HCT VFR BLD AUTO: 44.7 % (ref 36.5–49.3)
HGB BLD-MCNC: 15.5 G/DL (ref 12–17)
IMM GRANULOCYTES # BLD AUTO: 0.03 THOUSAND/UL (ref 0–0.2)
IMM GRANULOCYTES NFR BLD AUTO: 0 % (ref 0–2)
INR PPP: 0.95 (ref 0.84–1.19)
LYMPHOCYTES # BLD AUTO: 2.36 THOUSANDS/ΜL (ref 0.6–4.47)
LYMPHOCYTES NFR BLD AUTO: 30 % (ref 14–44)
MCH RBC QN AUTO: 30.7 PG (ref 26.8–34.3)
MCHC RBC AUTO-ENTMCNC: 34.7 G/DL (ref 31.4–37.4)
MCV RBC AUTO: 89 FL (ref 82–98)
MONOCYTES # BLD AUTO: 0.83 THOUSAND/ΜL (ref 0.17–1.22)
MONOCYTES NFR BLD AUTO: 11 % (ref 4–12)
NEUTROPHILS # BLD AUTO: 4.32 THOUSANDS/ΜL (ref 1.85–7.62)
NEUTS SEG NFR BLD AUTO: 54 % (ref 43–75)
NRBC BLD AUTO-RTO: 0 /100 WBCS
P AXIS: -9 DEGREES
P AXIS: 6 DEGREES
PLATELET # BLD AUTO: 280 THOUSANDS/UL (ref 149–390)
PMV BLD AUTO: 10.1 FL (ref 8.9–12.7)
POTASSIUM SERPL-SCNC: 3.7 MMOL/L (ref 3.5–5.3)
PR INTERVAL: 134 MS
PR INTERVAL: 142 MS
PROTHROMBIN TIME: 12.4 SECONDS (ref 11.6–14.5)
QRS AXIS: 14 DEGREES
QRS AXIS: 5 DEGREES
QRSD INTERVAL: 88 MS
QRSD INTERVAL: 90 MS
QT INTERVAL: 384 MS
QT INTERVAL: 386 MS
QTC INTERVAL: 386 MS
QTC INTERVAL: 402 MS
RBC # BLD AUTO: 5.05 MILLION/UL (ref 3.88–5.62)
SARS-COV-2 RNA RESP QL NAA+PROBE: NEGATIVE
SODIUM SERPL-SCNC: 136 MMOL/L (ref 136–145)
T WAVE AXIS: 10 DEGREES
T WAVE AXIS: 8 DEGREES
TROPONIN I SERPL-MCNC: <0.02 NG/ML
TROPONIN I SERPL-MCNC: <0.02 NG/ML
VENTRICULAR RATE: 60 BPM
VENTRICULAR RATE: 66 BPM
WBC # BLD AUTO: 7.89 THOUSAND/UL (ref 4.31–10.16)

## 2020-06-04 PROCEDURE — 87635 SARS-COV-2 COVID-19 AMP PRB: CPT | Performed by: EMERGENCY MEDICINE

## 2020-06-04 PROCEDURE — 85610 PROTHROMBIN TIME: CPT | Performed by: EMERGENCY MEDICINE

## 2020-06-04 PROCEDURE — G0283 ELEC STIM OTHER THAN WOUND: HCPCS | Performed by: PHYSICAL THERAPIST

## 2020-06-04 PROCEDURE — 97140 MANUAL THERAPY 1/> REGIONS: CPT | Performed by: PHYSICAL THERAPIST

## 2020-06-04 PROCEDURE — 36415 COLL VENOUS BLD VENIPUNCTURE: CPT | Performed by: EMERGENCY MEDICINE

## 2020-06-04 PROCEDURE — 71045 X-RAY EXAM CHEST 1 VIEW: CPT

## 2020-06-04 PROCEDURE — 97014 ELECTRIC STIMULATION THERAPY: CPT | Performed by: PHYSICAL THERAPIST

## 2020-06-04 PROCEDURE — 84484 ASSAY OF TROPONIN QUANT: CPT | Performed by: EMERGENCY MEDICINE

## 2020-06-04 PROCEDURE — 85025 COMPLETE CBC W/AUTO DIFF WBC: CPT | Performed by: EMERGENCY MEDICINE

## 2020-06-04 PROCEDURE — 80048 BASIC METABOLIC PNL TOTAL CA: CPT | Performed by: EMERGENCY MEDICINE

## 2020-06-04 PROCEDURE — 97110 THERAPEUTIC EXERCISES: CPT | Performed by: PHYSICAL THERAPIST

## 2020-06-04 PROCEDURE — 93005 ELECTROCARDIOGRAM TRACING: CPT

## 2020-06-04 PROCEDURE — 93010 ELECTROCARDIOGRAM REPORT: CPT | Performed by: INTERNAL MEDICINE

## 2020-06-04 PROCEDURE — 99285 EMERGENCY DEPT VISIT HI MDM: CPT | Performed by: EMERGENCY MEDICINE

## 2020-06-04 PROCEDURE — 99285 EMERGENCY DEPT VISIT HI MDM: CPT

## 2020-06-04 PROCEDURE — 85379 FIBRIN DEGRADATION QUANT: CPT | Performed by: EMERGENCY MEDICINE

## 2020-06-04 PROCEDURE — 85730 THROMBOPLASTIN TIME PARTIAL: CPT | Performed by: EMERGENCY MEDICINE

## 2020-06-04 RX ORDER — ALBUTEROL SULFATE 90 UG/1
2 AEROSOL, METERED RESPIRATORY (INHALATION) ONCE
Status: COMPLETED | OUTPATIENT
Start: 2020-06-04 | End: 2020-06-04

## 2020-06-04 RX ADMIN — ALBUTEROL SULFATE 2 PUFF: 90 AEROSOL, METERED RESPIRATORY (INHALATION) at 06:20

## 2020-06-05 ENCOUNTER — OFFICE VISIT (OUTPATIENT)
Dept: OBGYN CLINIC | Facility: CLINIC | Age: 60
End: 2020-06-05

## 2020-06-05 VITALS
WEIGHT: 254 LBS | DIASTOLIC BLOOD PRESSURE: 82 MMHG | TEMPERATURE: 97.6 F | SYSTOLIC BLOOD PRESSURE: 126 MMHG | HEIGHT: 67 IN | BODY MASS INDEX: 39.87 KG/M2

## 2020-06-05 DIAGNOSIS — S83.231A COMPLEX TEAR OF MEDIAL MENISCUS OF RIGHT KNEE, UNSPECIFIED WHETHER OLD OR CURRENT TEAR, INITIAL ENCOUNTER: Primary | ICD-10-CM

## 2020-06-05 PROCEDURE — 3008F BODY MASS INDEX DOCD: CPT | Performed by: ORTHOPAEDIC SURGERY

## 2020-06-05 PROCEDURE — 3079F DIAST BP 80-89 MM HG: CPT | Performed by: ORTHOPAEDIC SURGERY

## 2020-06-05 PROCEDURE — 99024 POSTOP FOLLOW-UP VISIT: CPT | Performed by: ORTHOPAEDIC SURGERY

## 2020-06-05 PROCEDURE — 3074F SYST BP LT 130 MM HG: CPT | Performed by: ORTHOPAEDIC SURGERY

## 2020-06-09 ENCOUNTER — OFFICE VISIT (OUTPATIENT)
Dept: PHYSICAL THERAPY | Facility: CLINIC | Age: 60
End: 2020-06-09
Payer: COMMERCIAL

## 2020-06-09 DIAGNOSIS — S83.241A OTHER TEAR OF MEDIAL MENISCUS, CURRENT INJURY, RIGHT KNEE, INITIAL ENCOUNTER: Primary | ICD-10-CM

## 2020-06-09 PROCEDURE — 97112 NEUROMUSCULAR REEDUCATION: CPT | Performed by: PHYSICAL THERAPIST

## 2020-06-09 PROCEDURE — 97140 MANUAL THERAPY 1/> REGIONS: CPT | Performed by: PHYSICAL THERAPIST

## 2020-06-09 PROCEDURE — 97110 THERAPEUTIC EXERCISES: CPT | Performed by: PHYSICAL THERAPIST

## 2020-06-10 ENCOUNTER — OFFICE VISIT (OUTPATIENT)
Dept: PHYSICAL THERAPY | Facility: CLINIC | Age: 60
End: 2020-06-10
Payer: COMMERCIAL

## 2020-06-10 DIAGNOSIS — S83.241A OTHER TEAR OF MEDIAL MENISCUS, CURRENT INJURY, RIGHT KNEE, INITIAL ENCOUNTER: Primary | ICD-10-CM

## 2020-06-10 PROCEDURE — G0283 ELEC STIM OTHER THAN WOUND: HCPCS | Performed by: PHYSICAL THERAPIST

## 2020-06-10 PROCEDURE — 97110 THERAPEUTIC EXERCISES: CPT | Performed by: PHYSICAL THERAPIST

## 2020-06-10 PROCEDURE — 97014 ELECTRIC STIMULATION THERAPY: CPT | Performed by: PHYSICAL THERAPIST

## 2020-06-10 PROCEDURE — 97140 MANUAL THERAPY 1/> REGIONS: CPT | Performed by: PHYSICAL THERAPIST

## 2020-06-16 ENCOUNTER — OFFICE VISIT (OUTPATIENT)
Dept: PHYSICAL THERAPY | Facility: CLINIC | Age: 60
End: 2020-06-16
Payer: COMMERCIAL

## 2020-06-16 DIAGNOSIS — S83.241A OTHER TEAR OF MEDIAL MENISCUS, CURRENT INJURY, RIGHT KNEE, INITIAL ENCOUNTER: Primary | ICD-10-CM

## 2020-06-16 PROCEDURE — 97112 NEUROMUSCULAR REEDUCATION: CPT | Performed by: PHYSICAL THERAPIST

## 2020-06-16 PROCEDURE — 97110 THERAPEUTIC EXERCISES: CPT | Performed by: PHYSICAL THERAPIST

## 2020-06-16 PROCEDURE — 97140 MANUAL THERAPY 1/> REGIONS: CPT | Performed by: PHYSICAL THERAPIST

## 2020-06-19 ENCOUNTER — OFFICE VISIT (OUTPATIENT)
Dept: PHYSICAL THERAPY | Facility: CLINIC | Age: 60
End: 2020-06-19
Payer: COMMERCIAL

## 2020-06-19 DIAGNOSIS — S83.241A OTHER TEAR OF MEDIAL MENISCUS, CURRENT INJURY, RIGHT KNEE, INITIAL ENCOUNTER: Primary | ICD-10-CM

## 2020-06-19 PROCEDURE — 97110 THERAPEUTIC EXERCISES: CPT | Performed by: PHYSICAL THERAPIST

## 2020-06-19 PROCEDURE — 97140 MANUAL THERAPY 1/> REGIONS: CPT | Performed by: PHYSICAL THERAPIST

## 2020-06-22 ENCOUNTER — OFFICE VISIT (OUTPATIENT)
Dept: PHYSICAL THERAPY | Facility: CLINIC | Age: 60
End: 2020-06-22
Payer: COMMERCIAL

## 2020-06-22 DIAGNOSIS — S83.241A OTHER TEAR OF MEDIAL MENISCUS, CURRENT INJURY, RIGHT KNEE, INITIAL ENCOUNTER: Primary | ICD-10-CM

## 2020-06-22 PROCEDURE — 97110 THERAPEUTIC EXERCISES: CPT

## 2020-06-22 PROCEDURE — 97140 MANUAL THERAPY 1/> REGIONS: CPT

## 2020-06-25 ENCOUNTER — OFFICE VISIT (OUTPATIENT)
Dept: PHYSICAL THERAPY | Facility: CLINIC | Age: 60
End: 2020-06-25
Payer: COMMERCIAL

## 2020-06-25 DIAGNOSIS — S83.241A OTHER TEAR OF MEDIAL MENISCUS, CURRENT INJURY, RIGHT KNEE, INITIAL ENCOUNTER: Primary | ICD-10-CM

## 2020-06-25 PROCEDURE — 97140 MANUAL THERAPY 1/> REGIONS: CPT | Performed by: PHYSICAL THERAPIST

## 2020-06-25 PROCEDURE — 97110 THERAPEUTIC EXERCISES: CPT | Performed by: PHYSICAL THERAPIST

## 2020-06-25 PROCEDURE — 97112 NEUROMUSCULAR REEDUCATION: CPT | Performed by: PHYSICAL THERAPIST

## 2020-06-29 ENCOUNTER — OFFICE VISIT (OUTPATIENT)
Dept: PHYSICAL THERAPY | Facility: CLINIC | Age: 60
End: 2020-06-29
Payer: COMMERCIAL

## 2020-06-29 ENCOUNTER — TELEPHONE (OUTPATIENT)
Dept: OBGYN CLINIC | Facility: HOSPITAL | Age: 60
End: 2020-06-29

## 2020-06-29 DIAGNOSIS — S83.241A OTHER TEAR OF MEDIAL MENISCUS, CURRENT INJURY, RIGHT KNEE, INITIAL ENCOUNTER: Primary | ICD-10-CM

## 2020-06-29 PROCEDURE — 97110 THERAPEUTIC EXERCISES: CPT | Performed by: PHYSICAL THERAPIST

## 2020-06-29 PROCEDURE — 97140 MANUAL THERAPY 1/> REGIONS: CPT | Performed by: PHYSICAL THERAPIST

## 2020-06-29 PROCEDURE — 97016 VASOPNEUMATIC DEVICE THERAPY: CPT | Performed by: PHYSICAL THERAPIST

## 2020-07-02 ENCOUNTER — OFFICE VISIT (OUTPATIENT)
Dept: PHYSICAL THERAPY | Facility: CLINIC | Age: 60
End: 2020-07-02
Payer: COMMERCIAL

## 2020-07-02 DIAGNOSIS — S83.241A OTHER TEAR OF MEDIAL MENISCUS, CURRENT INJURY, RIGHT KNEE, INITIAL ENCOUNTER: Primary | ICD-10-CM

## 2020-07-02 PROCEDURE — 97140 MANUAL THERAPY 1/> REGIONS: CPT | Performed by: PHYSICAL THERAPIST

## 2020-07-02 PROCEDURE — 97110 THERAPEUTIC EXERCISES: CPT | Performed by: PHYSICAL THERAPIST

## 2020-07-02 NOTE — PROGRESS NOTES
Daily Note     Today's date: 2020  Patient name: Yelena Wells  : 1960  MRN: [id number]  Referring provider: Zen Ch  Dx:   Encounter Diagnosis     ICD-10-CM    1  Other tear of medial meniscus, current injury, right knee, initial encounter R53 144M                   Subjective: Pt reported that "I felt pretty good after the last workout "     Objective: See treatment diary below      Assessment: Tolerated treatment well  Increased endurance training through increased bike times  Attempted 6" step ups, however did increase pain, step height was decreased to 4" which improved symptoms  MRE's were performed to address weakness across different planes of motion at different angles  Patient demonstrated fatigue post treatment, exhibited good technique with therapeutic exercises and would benefit from continued PT  Plan: Continue per plan of care        Precautions: post surgical wrap to be removed , falls risk post op      Manuals    PROM stretch, jt mobs, STM patellar tendon JZ       (R knee prom, RLE str,  STM pat tend)  AFB SK JZ                                          Neuro Re-Ed    Quadset             SLR, quad set       3# 3x10 3# 3x10 3# 2x10    clamshell             LAQ+       5# 3x10                                             Ther Ex    SLR 3# 3x10ea            Quad set        5"x30 5"x30    Supine active HS stretch             Seated hamstring stretch       :15x5 :15x3 np    Heel slides             S/l hip abd             SAQ       5# 3x10 5# 3x10 np    ITB stretch strap :15x5      :15x3 :15x3 :15x3 :15x5   Hamstring stretch strap :15x5      :15x3 :15x3 :15x3 :15x5   Soleus stre angle             Calf stretch angle             TKE ball             HR             TR back to wall             PB hamstring curl u/l       R 3x10 R 3x10 np    Leg press u/l shallow range          30/ 3x10   Bike 10'      7' 7' 7'    Bridge w/ TB abd  3x15      2x10 2x10 2x12  3x15   S/l clamshell          B :05 x 20   PKFS :15x5ea         :15x5   Retro walk          25/ 10x   LAQ       Held P! 5# 3x10 5# 3x10    Stand HS curl 6# 3x10            Step ups  4" 3x10ea                                                   TKE         Green  5" x20    Ther Activity                                       Gait Training                                       Modalities 7/2 6/19 6/22 6/25 6/29           5' 5' 5'   CT       10 10'  post 10' post    Estim       10      Vaso          10

## 2020-07-06 ENCOUNTER — OFFICE VISIT (OUTPATIENT)
Dept: PHYSICAL THERAPY | Facility: CLINIC | Age: 60
End: 2020-07-06
Payer: COMMERCIAL

## 2020-07-06 DIAGNOSIS — S83.241A OTHER TEAR OF MEDIAL MENISCUS, CURRENT INJURY, RIGHT KNEE, INITIAL ENCOUNTER: Primary | ICD-10-CM

## 2020-07-06 PROCEDURE — 97110 THERAPEUTIC EXERCISES: CPT | Performed by: PHYSICAL THERAPIST

## 2020-07-06 PROCEDURE — 97140 MANUAL THERAPY 1/> REGIONS: CPT | Performed by: PHYSICAL THERAPIST

## 2020-07-06 NOTE — PROGRESS NOTES
Daily Note     Today's date: 2020  Patient name: Afsaneh Mg  : 1960  MRN: [id number]  Referring provider: Lilian Sales  Dx:   Encounter Diagnosis     ICD-10-CM    1  Other tear of medial meniscus, current injury, right knee, initial encounter S86 111D                   Subjective: Pt reported that he is improving with his strength as the sessions continue  Noted that he is having less pain getting out of bed and truck  Objective: See treatment diary below      Assessment: Tolerated treatment well  Noted increased knee pain with last set of LP u/l, demonstrating further need for strengthening  Patient demonstrated fatigue post treatment, exhibited good technique with therapeutic exercises and would benefit from continued PT  Plan: Continue per plan of care        Precautions: post surgical wrap to be removed , falls risk post op      Manuals    PROM stretch, jt mobs, STM patellar tendon JZ JZ      (R knee prom, RLE str,  STM pat tend)  AFB SK JZ                                          Neuro Re-Ed    Quadset             SLR, quad set       3# 3x10 3# 3x10 3# 2x10    clamshell             LAQ+       5# 3x10                                             Ther Ex    SLR 3# 3x10ea            Quad set        5"x30 5"x30    Supine active HS stretch             Seated hamstring stretch       :15x5 :15x3 np    Heel slides             S/l hip abd             SAQ       5# 3x10 5# 3x10 np    ITB stretch strap :15x5 :15x3     :15x3 :15x3 :15x3 :15x5   Hamstring stretch strap :15x5 :15x3     :15x3 :15x3 :15x3 :15x5   Soleus stre angle             Calf stretch angle             TKE ball             HR             TR back to wall             PB hamstring curl u/l       R 3x10 R 3x10 np    Leg press u/l shallow range          30/ 3x10   Bike 10' 10' lvl2     7' 7' 7'    Bridge w/ TB abd  3x15 B 3x15 2x10 2x10 2x12  3x15   S/l clamshell          B :05 x 20   PKFS :15x5ea :15x5        :15x5   Retro walk  35/ 2x10        25/ 10x   LAQ       Held P! 5# 3x10 5# 3x10    Stand HS curl 6# 3x10            Step ups  4" 3x10ea 4" 3x10           EFX   2'                                      TKE         Green  5" x20    Ther Activity                                       Gait Training                                       Modalities 7/2 7/6 6/19 6/22 6/25 6/29   MH        5' 5' 5'   CT       10 10'  post 10' post    Estim       10      Vaso          10

## 2020-07-08 ENCOUNTER — OFFICE VISIT (OUTPATIENT)
Dept: PHYSICAL THERAPY | Facility: CLINIC | Age: 60
End: 2020-07-08
Payer: COMMERCIAL

## 2020-07-08 DIAGNOSIS — S83.241A OTHER TEAR OF MEDIAL MENISCUS, CURRENT INJURY, RIGHT KNEE, INITIAL ENCOUNTER: Primary | ICD-10-CM

## 2020-07-08 PROCEDURE — 97016 VASOPNEUMATIC DEVICE THERAPY: CPT | Performed by: PHYSICAL THERAPIST

## 2020-07-08 PROCEDURE — 97110 THERAPEUTIC EXERCISES: CPT | Performed by: PHYSICAL THERAPIST

## 2020-07-08 PROCEDURE — 97140 MANUAL THERAPY 1/> REGIONS: CPT | Performed by: PHYSICAL THERAPIST

## 2020-07-08 NOTE — PROGRESS NOTES
Daily Note     Today's date: 2020  Patient name: Johnie Truong  : 1960  MRN: [id number]  Referring provider: Vinicio Cervantes  Dx:   Encounter Diagnosis     ICD-10-CM    1  Other tear of medial meniscus, current injury, right knee, initial encounter E47 399V                   Subjective: Pt reported, "I am feeling pretty good today "       Objective: See treatment diary below      Assessment: Tolerated treatment well  Increased bike resistance and with standing knee flexion, which he performed well without provocation of pain  Demonstrated significant improvement in knee extension ROM without any anterior knee pain, reflecting improvement  Patient demonstrated fatigue post treatment, exhibited good technique with therapeutic exercises and would benefit from continued PT  Plan: Continue per plan of care        Precautions: post surgical wrap to be removed , falls risk post op      Manuals    PROM stretch, jt mobs, STM patellar tendon JZ JZ JZ     (R knee prom, RLE str,  STM pat tend)  AFB SK JZ                                          Neuro Re-Ed    Quadset             SLR, quad set       3# 3x10 3# 3x10 3# 2x10    clamshell             LAQ+       5# 3x10      SLS   :15x5                                    Ther Ex    SLR 3# 3x10ea            Quad set        5"x30 5"x30    Supine active HS stretch             Seated hamstring stretch       :15x5 :15x3 np    Heel slides             S/l hip abd             SAQ       5# 3x10 5# 3x10 np    ITB stretch strap :15x5 :15x3     :15x3 :15x3 :15x3 :15x5   Hamstring stretch strap :15x5 :15x3     :15x3 :15x3 :15x3 :15x5   Soleus stre angle             Calf stretch angle             TKE ball             HR             TR back to wall             PB hamstring curl u/l       R 3x10 R 3x10 np    Leg press u/l shallow range          30/ 3x10   Bike 10' 10' lvl2 10' lvl3    7' 7' 7'    Bridge w/ TB abd  3x15 B 3x15     2x10 2x10 2x12  3x15   S/l clamshell          B :05 x 20   PKFS :15x5ea :15x5 :15x5       :15x5   Retro walk  35/ 2x10 25/ 3x10       25/ 10x   LAQ       Held P! 5# 3x10 5# 3x10    Stand HS curl 6# 3x10  7 5# 3x10          Step ups  4" 3x10ea 4" 3x10 4" 3x10          EFX   2'                                      TKE         Green  5" x20    Ther Activity                                       Gait Training                                       Modalities 7/2 7/6 7/8 6/19 6/22 6/25 6/29   MH        5' 5' 5'   CT       10 10'  post 10' post    Estim       10      Vaso   10       10

## 2020-07-13 ENCOUNTER — APPOINTMENT (OUTPATIENT)
Dept: PHYSICAL THERAPY | Facility: CLINIC | Age: 60
End: 2020-07-13
Payer: COMMERCIAL

## 2020-07-16 ENCOUNTER — APPOINTMENT (OUTPATIENT)
Dept: PHYSICAL THERAPY | Facility: CLINIC | Age: 60
End: 2020-07-16
Payer: COMMERCIAL

## 2020-07-20 ENCOUNTER — APPOINTMENT (OUTPATIENT)
Dept: PHYSICAL THERAPY | Facility: CLINIC | Age: 60
End: 2020-07-20
Payer: COMMERCIAL

## 2020-07-23 ENCOUNTER — OFFICE VISIT (OUTPATIENT)
Dept: PHYSICAL THERAPY | Facility: CLINIC | Age: 60
End: 2020-07-23
Payer: COMMERCIAL

## 2020-07-23 DIAGNOSIS — S83.241A OTHER TEAR OF MEDIAL MENISCUS, CURRENT INJURY, RIGHT KNEE, INITIAL ENCOUNTER: Primary | ICD-10-CM

## 2020-07-23 PROCEDURE — 97140 MANUAL THERAPY 1/> REGIONS: CPT | Performed by: PHYSICAL THERAPIST

## 2020-07-23 PROCEDURE — 97016 VASOPNEUMATIC DEVICE THERAPY: CPT | Performed by: PHYSICAL THERAPIST

## 2020-07-23 PROCEDURE — 97110 THERAPEUTIC EXERCISES: CPT | Performed by: PHYSICAL THERAPIST

## 2020-07-23 NOTE — PROGRESS NOTES
Daily Note     Today's date: 2020  Patient name: Carolyne Rizo  : 1960  MRN: [id number]  Referring provider: Shruthi Chow  Dx:   Encounter Diagnosis     ICD-10-CM    1  Other tear of medial meniscus, current injury, right knee, initial encounter S80 026S                 Subjective: Pt reported that he is improving overall with walking and stair negotiation, however stated that he has been frustrated with some posterior knee pain  Objective: See treatment diary below      Assessment: Tolerated treatment well  Noted increased knee pain since patient was unable to attend PT for the past 2 weeks  Modified program accordingly  Noted distal medial knee TTP over hamstring insertions and above  Performed stretches following manual treatment, which improved flexibility  Patient demonstrated fatigue post treatment, exhibited good technique with therapeutic exercises and would benefit from continued PT  Plan: Continue per plan of care        Precautions: post surgical wrap to be removed , falls risk post op      Manuals    PROM stretch, jt mobs, STM patellar tendon JZ JZ JZ JZ    (R knee prom, RLE str,  STM pat tend)  AFB SK JZ                                          Neuro Re-Ed    Lovetta Burows             SLR, quad set       3# 3x10 3# 3x10 3# 2x10    clamshell             LAQ+       5# 3x10      SLS   :15x5                                    Ther Ex    SLR 3# 3x10ea   3# 3x10         Quad set        5"x30 5"x30    Supine active HS stretch             Seated hamstring stretch    :15x5   :15x5 :15x3 np    Heel slides             S/l hip abd             SAQ       5# 3x10 5# 3x10 np    ITB stretch strap :15x5 :15x3  :15x5   :15x3 :15x3 :15x3 :15x5   Hamstring stretch strap :15x5 :15x3  :15x5   :15x3 :15x3 :15x3 :15x5   Soleus stre angle             Calf stretch angle             TKE ball             HR             TR back to wall             PB hamstring curl u/l       R 3x10 R 3x10 np    Leg press u/l shallow range          30/ 3x10   Bike 10' 10' lvl2 10' lvl3 5'    7' 7' 7'    Bridge w/ TB abd  3x15 B 3x15     2x10 2x10 2x12  3x15   S/l clamshell          B :05 x 20   PKFS :15x5ea :15x5 :15x5 pain      :15x5   Retro walk  35/ 2x10 25/ 3x10       25/ 10x   LAQ       Held P! 5# 3x10 5# 3x10    Stand HS curl 6# 3x10  7 5# 3x10          Step ups  4" 3x10ea 4" 3x10 4" 3x10          EFX   2'                         Kayla forward kick    8/ 3x10         TKE         Green  5" x20    Ther Activity                                       Gait Training                                       Modalities 7/2 7/6 7/8 7/23 6/19 6/22 6/25 6/29   MH        5' 5' 5'   CT       10 10'  post 10' post    Estim       10      Vaso   10 10      10

## 2020-07-24 ENCOUNTER — OFFICE VISIT (OUTPATIENT)
Dept: OBGYN CLINIC | Facility: CLINIC | Age: 60
End: 2020-07-24

## 2020-07-24 VITALS
TEMPERATURE: 97.6 F | WEIGHT: 246 LBS | SYSTOLIC BLOOD PRESSURE: 124 MMHG | DIASTOLIC BLOOD PRESSURE: 84 MMHG | HEIGHT: 67 IN | HEART RATE: 68 BPM | BODY MASS INDEX: 38.61 KG/M2

## 2020-07-24 DIAGNOSIS — S83.231A COMPLEX TEAR OF MEDIAL MENISCUS OF RIGHT KNEE, UNSPECIFIED WHETHER OLD OR CURRENT TEAR, INITIAL ENCOUNTER: Primary | ICD-10-CM

## 2020-07-24 PROCEDURE — 99024 POSTOP FOLLOW-UP VISIT: CPT | Performed by: ORTHOPAEDIC SURGERY

## 2020-07-24 PROCEDURE — 3079F DIAST BP 80-89 MM HG: CPT | Performed by: ORTHOPAEDIC SURGERY

## 2020-07-24 PROCEDURE — 3074F SYST BP LT 130 MM HG: CPT | Performed by: ORTHOPAEDIC SURGERY

## 2020-07-24 PROCEDURE — 3008F BODY MASS INDEX DOCD: CPT | Performed by: ORTHOPAEDIC SURGERY

## 2020-07-24 NOTE — PROGRESS NOTES
Knee Post Operative Visit     Assesment:     56 y o  male 2 months s/p surgical arthroscopy of the right knee with medial meniscectomy, DOS: 5/27/2020       Plan:    Post-Operative treatment:    Ice to knee for 20 minutes at least 1-2 times daily  PT for ROM/strengthening to knee, hip and core  OTC NSAIDS prn for pain  Imaging: All imaging from today was reviewed by myself and explained to the patient  Weight bearing:  as tolerated     ROM:  Full    Brace:  No brace needed    DVT Prophylaxis:  Ambulation    Follow up:   6 weeks     Patient was advised that if they have any fevers, chills, chest pain, shortness of breath, redness or drainage from the incision, please let our office know immediately  Chief Complaint   Patient presents with    Right Knee - Post-op       History of Present Illness: The patient is a 56 y o  male who is being evaluated post operatively 2 months  status post surgical arthroscopy of the right knee with medial meniscectomy, DOS: 5/27/2020  Since the prior visit, He reports significant improvement  He notes he feels 90% improvement  He has minimal pain  The pain is a 2/10  He denies any numbness or tingling  Pain is well controlled  The patient is using ice to control swelling  The patient denies any fevers, chills, calf pain, chest pain/shortness of breath, redness or drainage from the incision  I have reviewed the past medical, surgical, social and family history, medications and allergies as documented in the EMR  Review of systems: ROS is negative other than that noted in the HPI  Constitutional: Negative for fatigue and fever  Physical Exam:    Blood pressure 124/84, pulse 68, temperature 97 6 °F (36 4 °C), height 5' 7" (1 702 m), weight 112 kg (246 lb)      General/Constitutional: NAD, well developed, well nourished  HENT: Normocephalic, atraumatic  CV: Intact distal pulses, regular rate  Resp: No respiratory distress or labored breathing  Lymphatic: No lymphadenopathy palpated  Neuro: Alert and Oriented x 3, no focal deficits  Psych: Normal mood, normal affect, normal judgement, normal behavior  Skin: Warm, dry, no rashes, no erythema      Knee Exam (focused):                   RIGHT LEFT   ROM:   0-130 0-130   Palpation: Effusion negative negative     MJL tenderness Positive mild Negative     LJL tenderness Negative Negative   Instability: Varus stable stable     Valgus stable stable   Special Tests: Lachman Negative Negative     Posterior drawer Negative Negative     Anterior drawer Negative Negative     Pivot shift not tested not tested     Dial not tested not tested   Patella: Palpation no tenderness no tenderness     Mobility 1/4 1/4     Apprehension Negative Negative   Other: Single leg 1/4 squat not tested not tested      Incisions show no erythema, no drainage    LE NV Exam: +2 DP/PT pulses bilaterally  Sensation intact to light touch L2-S1 bilaterally     Bilateral hip ROM demonstrates no pain actively or passively    No calf tenderness to palpation bilaterally

## 2020-07-27 ENCOUNTER — OFFICE VISIT (OUTPATIENT)
Dept: PHYSICAL THERAPY | Facility: CLINIC | Age: 60
End: 2020-07-27
Payer: COMMERCIAL

## 2020-07-27 DIAGNOSIS — S83.241A OTHER TEAR OF MEDIAL MENISCUS, CURRENT INJURY, RIGHT KNEE, INITIAL ENCOUNTER: Primary | ICD-10-CM

## 2020-07-27 PROCEDURE — 97110 THERAPEUTIC EXERCISES: CPT | Performed by: PHYSICAL THERAPIST

## 2020-07-27 PROCEDURE — 97016 VASOPNEUMATIC DEVICE THERAPY: CPT | Performed by: PHYSICAL THERAPIST

## 2020-07-27 PROCEDURE — 97140 MANUAL THERAPY 1/> REGIONS: CPT | Performed by: PHYSICAL THERAPIST

## 2020-07-27 NOTE — PROGRESS NOTES
Daily Note     Today's date: 2020  Patient name: Castillo Boyd  : 1960  MRN: [id number]  Referring provider: Issa Rodriguez  Dx:   Encounter Diagnosis     ICD-10-CM    1  Other tear of medial meniscus, current injury, right knee, initial encounter D21 [id number]                   Subjective: Pt reported that he experienced a significant improvement in TTP over insertion of hamstring following IASTM per last session  Objective: See treatment diary below      Assessment: Tolerated treatment well  Required some v/c for form with all exercises, however noted no increase in pain  Patient demonstrated fatigue post treatment, exhibited good technique with therapeutic exercises and would benefit from continued PT  Plan: Continue per plan of care        Precautions: post surgical wrap to be removed , falls risk post op      Manuals         PROM stretch, jt mobs, STM patellar tendon JZ JZ JZ JZ JZ                                               Neuro Re-Ed         Quadset             SLR, quad set             clamshell             LAQ+             SLS   :15x5                                    Ther Ex         SLR 3# 3x10ea   3# 3x10 3# 3x10        Quad set             Supine active HS stretch             Seated hamstring stretch    :15x5 :15x5        Heel slides             S/l hip abd     3x10        SAQ             ITB stretch strap :15x5 :15x3  :15x5 :15x5        Hamstring stretch strap :15x5 :15x3  :15x5 :15x5        Adductor stretch strap     :15x5        Calf stretch angle             TKE ball             HR             TR back to wall             PB hamstring curl u/l             Leg press u/l shallow range             Bike 10' 10' lvl2 10' lvl3 5'  8'        Bridge w/ TB abd  3x15 B 3x15           S/l clamshell             PKFS :15x5ea :15x5 :15x5 pain :15x3        Retro walk  35/ 2x10 25/ 3x10          LAQ             Stand HS curl 6# 3x10  7 5# 3x10  3x10        Step ups  4" 3x10ea 4" 3x10 4" 3x10          EFX   2'                         [place] forward kick    8/ 3x10         TKE             Ther Activity                                       Gait Training                                       Modalities 7/2 7/6 7/8 7/23 7/27                     CT             Estim             Vaso   10 10 10

## 2020-07-30 ENCOUNTER — OFFICE VISIT (OUTPATIENT)
Dept: PHYSICAL THERAPY | Facility: CLINIC | Age: 60
End: 2020-07-30
Payer: COMMERCIAL

## 2020-07-30 DIAGNOSIS — S83.241A OTHER TEAR OF MEDIAL MENISCUS, CURRENT INJURY, RIGHT KNEE, INITIAL ENCOUNTER: Primary | ICD-10-CM

## 2020-07-30 PROCEDURE — 97110 THERAPEUTIC EXERCISES: CPT | Performed by: PHYSICAL THERAPIST

## 2020-07-30 PROCEDURE — 97140 MANUAL THERAPY 1/> REGIONS: CPT | Performed by: PHYSICAL THERAPIST

## 2020-07-30 NOTE — PROGRESS NOTES
Daily Note     Today's date: 2020  Patient name: Joellen Mina  : 1960  MRN: [id number]  Referring provider: Sam Stern  Dx:   Encounter Diagnosis     ICD-10-CM    1  Other tear of medial meniscus, current injury, right knee, initial encounter S81 980C                   Subjective: Pt reported that he is improving with his functional ability, however is still experiencing pain in his knee with terminal knee extension  Objective: See treatment diary below      Assessment: Tolerated treatment well  Continues to report knee pain with terminal knee extension, reflecting further need for PT  Patient demonstrated fatigue post treatment, exhibited good technique with therapeutic exercises and would benefit from continued PT  Plan: Continue per plan of care        Precautions: post surgical wrap to be removed , falls risk post op      Manuals        PROM stretch, jt mobs, STM patellar tendon JZ JZ JZ JZ JZ JZ                                              Neuro Re-Ed        Ruthell Ahsleyter             SLR, quad set             clamshell             LAQ+             SLS   :15x5                                    Ther Ex        SLR 3# 3x10ea   3# 3x10 3# 3x10 3# 3x10       Quad set             Supine active HS stretch             Seated hamstring stretch    :15x5 :15x5 :15x5       Heel slides             S/l hip abd     3x10 3x10       SAQ             ITB stretch strap :15x5 :15x3  :15x5 :15x5 :15x5       Hamstring stretch strap :15x5 :15x3  :15x5 :15x5 :15x5       Adductor stretch strap     :15x5 :15x5       Calf stretch angle             TKE ball             HR             TR back to wall             PB hamstring curl u/l             Leg press u/l shallow range             Bike 10' 10' lvl2 10' lvl3 5'  8' 5'        Bridge w/ TB abd  3x15 B 3x15           S/l clamshell             PKFS :15x5ea :15x5 :15x5 pain :15x3 :15x5       Retro walk  35/ 2x10 25/ 3x10          LAQ             Stand HS curl 6# 3x10  7 5# 3x10  3x10        Step ups  4" 3x10ea 4" 3x10 4" 3x10          EFX   2'            Kayla kick back      11/ 3x10       Kayla forward kick    8/ 3x10  8/ 3x10       TKE             Ther Activity                                       Gait Training                                       Modalities 7/2 7/6 7/8 7/23 7/27 7/30                    CT             Estim             Vaso   10 10 10

## 2020-08-03 ENCOUNTER — OFFICE VISIT (OUTPATIENT)
Dept: PHYSICAL THERAPY | Facility: CLINIC | Age: 60
End: 2020-08-03
Payer: COMMERCIAL

## 2020-08-03 DIAGNOSIS — S83.241A OTHER TEAR OF MEDIAL MENISCUS, CURRENT INJURY, RIGHT KNEE, INITIAL ENCOUNTER: Primary | ICD-10-CM

## 2020-08-03 PROCEDURE — 97110 THERAPEUTIC EXERCISES: CPT | Performed by: PHYSICAL THERAPIST

## 2020-08-03 PROCEDURE — 97140 MANUAL THERAPY 1/> REGIONS: CPT | Performed by: PHYSICAL THERAPIST

## 2020-08-03 NOTE — PROGRESS NOTES
Daily Note     Today's date: 8/3/2020  Patient name: Harrsi Rachel  : 1960  MRN: [id number]  Referring provider: Anita Shanks  Dx:   Encounter Diagnosis     ICD-10-CM    1  Other tear of medial meniscus, current injury, right knee, initial encounter  S87 121Q                   Subjective: Pt reported that "I don't have much pain today "       Objective: See treatment diary below      Assessment: Tolerated treatment well  Performed leg press and resisted retro walk well without provocation of pain  Patient demonstrated fatigue post treatment, exhibited good technique with therapeutic exercises and would benefit from continued PT  Plan: Continue per plan of care        Precautions: post surgical wrap to be removed , falls risk post op      Manuals  8/3      PROM stretch, jt mobs, STM patellar tendon JZ JZ JZ JZ JZ JZ JZ                                             Neuro Re-Ed  83      Quadset             SLR, quad set             clamshell             LAQ+             SLS   :15x5                                    Ther Ex  83      SLR 3# 3x10ea   3# 3x10 3# 3x10 3# 3x10 3# 3x10      Quad set             Supine active HS stretch             Seated hamstring stretch    :15x5 :15x5 :15x5 Std :15x5      Heel slides             S/l hip abd     3x10 3x10 3x10      SAQ             ITB stretch strap :15x5 :15x3  :15x5 :15x5 :15x5 Std :15x5      Hamstring stretch strap :15x5 :15x3  :15x5 :15x5 :15x5       Adductor stretch strap     :15x5 :15x5 Std :15x5      Calf stretch angle             TKE ball             HR             TR back to wall             PB hamstring curl u/l             Leg press u/l shallow range       B/l 90/ 3x10      Bike 10' 10' lvl2 10' lvl3 5'  8' 5'  5'      Bridge w/ TB abd  3x15 B 3x15     Heel 3x10      S/l clamshell       G 3x10      PKFS :15x5ea :15x5 :15x5 pain :15x3 :15x5       Retro walk 35/ 2x10 25/ 3x10    45/ 3x10      LAQ             Stand HS curl 6# 3x10  7 5# 3x10  3x10        Step ups  4" 3x10ea 4" 3x10 4" 3x10          EFX   2'            [place] kick back      11/ 3x10       [place] forward kick    8/ 3x10  8/ 3x10       TKE             Ther Activity                                       Gait Training                                       Modalities 7/2 7/6 7/8 7/23 7/27 7/30 8/3      MH             CT             Estim             Vaso   10 10 10

## 2020-08-06 ENCOUNTER — APPOINTMENT (OUTPATIENT)
Dept: PHYSICAL THERAPY | Facility: CLINIC | Age: 60
End: 2020-08-06
Payer: COMMERCIAL

## 2020-08-10 ENCOUNTER — OFFICE VISIT (OUTPATIENT)
Dept: PHYSICAL THERAPY | Facility: CLINIC | Age: 60
End: 2020-08-10
Payer: COMMERCIAL

## 2020-08-10 DIAGNOSIS — S83.241A OTHER TEAR OF MEDIAL MENISCUS, CURRENT INJURY, RIGHT KNEE, INITIAL ENCOUNTER: Primary | ICD-10-CM

## 2020-08-10 PROCEDURE — 97140 MANUAL THERAPY 1/> REGIONS: CPT | Performed by: PHYSICAL THERAPIST

## 2020-08-10 PROCEDURE — 97016 VASOPNEUMATIC DEVICE THERAPY: CPT | Performed by: PHYSICAL THERAPIST

## 2020-08-10 PROCEDURE — 97110 THERAPEUTIC EXERCISES: CPT | Performed by: PHYSICAL THERAPIST

## 2020-08-10 NOTE — PROGRESS NOTES
Daily Note     Today's date: 8/10/2020  Patient name: Carl Powell  : 1960  MRN: [id number]  Referring provider: Sara Ramirez  Dx:   Encounter Diagnosis     ICD-10-CM    1  Other tear of medial meniscus, current injury, right knee, initial encounter  S82 842P                   Subjective: Pt reported that he has not been having difficulty or pain with getting into/out bed/truck  Notes that his gait is improved significantly  Objective: See treatment diary below      Assessment: Tolerated treatment well  Presented with significant plantar flexion weakness with u/l heel raise, initiated u/l heel raises and u/l calf press, which he performed well without pain  Patient demonstrated fatigue post treatment, exhibited good technique with therapeutic exercises and would benefit from continued PT  Plan: Continue per plan of care        Precautions: post surgical wrap to be removed , falls risk post op      Manuals 7/ 2 7/6 7/8 7/23 7/27 7/30 8/3 8/10     PROM stretch, jt mobs, STM patellar tendon JZ JZ JZ JZ JZ JZ JZ JZ                                            Neuro Re-Ed 7/2 7/6 7/8 7/23 7/27 7/30 8/3 8/10     Rondel Caper             SLR, quad set             clamshell             LAQ+             SLS   :15x5                                    Ther Ex 7/2 7/6 7/8 7/23 7/27 7/30 8/3 8/10     SLR 3# 3x10ea   3# 3x10 3# 3x10 3# 3x10 3# 3x10 3# 3x10     Quad set             Supine active HS stretch             Seated hamstring stretch    :15x5 :15x5 :15x5 Std :15x5      Heel slides             S/l hip abd     3x10 3x10 3x10      SAQ             ITB stretch strap :15x5 :15x3  :15x5 :15x5 :15x5 Std :15x5 :15x5     Hamstring stretch strap :15x5 :15x3  :15x5 :15x5 :15x5       Adductor stretch strap     :15x5 :15x5 Std :15x5      Calf stretch step        :15x3     Calf press        50/ 3x10     U/l HR        3x10ea     TR back to wall             PB hamstring curl u/l             Leg press u/l shallow range       B/l 90/ 3x10      Bike 10' 10' lvl2 10' lvl3 5'  8' 5'  5'      Bridge w/ TB abd  3x15 B 3x15     Heel 3x10      S/l clamshell       G 3x10      PKFS :15x5ea :15x5 :15x5 pain :15x3 :15x5  :15x4     Retro walk  35/ 2x10 25/ 3x10    45/ 3x10      LAQ             Stand HS curl 6# 3x10  7 5# 3x10  3x10   7 5# 3x10     Step ups  4" 3x10ea 4" 3x10 4" 3x10          EFX   2'            [place] kick back      11/ 3x10       [place] forward kick    8/ 3x10  8/ 3x10       TKE             Ther Activity                                       Gait Training                                       Modalities 7/2 7/6 7/8 7/23 7/27 7/30 8/3 8/10     MH             CT             Estim             Vaso   10 10 10

## 2020-08-13 ENCOUNTER — OFFICE VISIT (OUTPATIENT)
Dept: PHYSICAL THERAPY | Facility: CLINIC | Age: 60
End: 2020-08-13
Payer: COMMERCIAL

## 2020-08-13 DIAGNOSIS — S83.241A OTHER TEAR OF MEDIAL MENISCUS, CURRENT INJURY, RIGHT KNEE, INITIAL ENCOUNTER: Primary | ICD-10-CM

## 2020-08-13 PROCEDURE — 97140 MANUAL THERAPY 1/> REGIONS: CPT | Performed by: PHYSICAL THERAPIST

## 2020-08-13 PROCEDURE — 97110 THERAPEUTIC EXERCISES: CPT | Performed by: PHYSICAL THERAPIST

## 2020-08-13 NOTE — PROGRESS NOTES
Daily Note     Today's date: 2020  Patient name: Nikki Golden  : 1960  MRN: [id number]  Referring provider: Renetta Perera  Dx:   Encounter Diagnosis     ICD-10-CM    1  Other tear of medial meniscus, current injury, right knee, initial encounter  S85 825C                 Subjective: Pt reported that "I have felt pretty good recently  I am fearful about the next couple weeks because I am going to the beach, and the last time I was there my pain got worse "        Objective: See treatment diary below      Assessment: Tolerated treatment well  Patient demonstrated fatigue post treatment, exhibited good technique with therapeutic exercises and would benefit from continued PT  Plan: Continue per plan of care        Precautions: post surgical wrap to be removed , falls risk post op      Manuals 7/ 2 7/6 7/8 7/23 7/27 7/30 8/3 8/10 8/13    PROM stretch, jt mobs, STM patellar tendon JZ JZ JZ JZ JZ JZ JZ JZ JZ                                           Neuro Re-Ed 7/2 7/6 7/8 7/23 7/27 7/30 8/3 8/10 8/13    Quadset             SLR, quad set             clamshell             LAQ+             SLS   :15x5      :15x5                              Ther Ex 7/2 7/6 7/8 7/23 7/27 7/30 8/3 8/10 8/13    SLR 3# 3x10ea   3# 3x10 3# 3x10 3# 3x10 3# 3x10 3# 3x10 3# 3x10    Quad set             Supine active HS stretch             Seated hamstring stretch    :15x5 :15x5 :15x5 Std :15x5      Heel slides             S/l hip abd     3x10 3x10 3x10  3x10    SAQ             ITB stretch strap :15x5 :15x3  :15x5 :15x5 :15x5 Std :15x5 :15x5 :15x5    Hamstring stretch strap :15x5 :15x3  :15x5 :15x5 :15x5       Adductor stretch strap     :15x5 :15x5 Std :15x5      Calf stretch step        :15x3 :15x3    Calf press        50/ 3x10 50/ 3x10    U/l HR        3x10ea 3x10    TR back to wall             PB hamstring curl u/l             Leg press u/l shallow range       B/l 90/ 3x10  B/l 90/ 3x10    Bike 10' 10' lvl2 10' lvl3 5'  8' 5'  5'      Bridge w/ TB abd  3x15 B 3x15     Heel 3x10      S/l clamshell       G 3x10      PKFS :15x5ea :15x5 :15x5 pain :15x3 :15x5  :15x4 :15x5    Retro walk  35/ 2x10 25/ 3x10    45/ 3x10      LAQ             Stand HS curl 6# 3x10  7 5# 3x10  3x10   7 5# 3x10 7 5# 3x10    Step ups  4" 3x10ea 4" 3x10 4" 3x10          EFX   2'            Kayla kick back      11/ 3x10       [place] forward kick    8/ 3x10  8/ 3x10       TKE             Ther Activity                                       Gait Training                                       Modalities 7/2 7/6 7/8 7/23 7/27 7/30 8/3 8/10 8/13    MH             CT             Estim             Vaso   10 10 10

## 2020-08-17 ENCOUNTER — APPOINTMENT (OUTPATIENT)
Dept: PHYSICAL THERAPY | Facility: CLINIC | Age: 60
End: 2020-08-17
Payer: COMMERCIAL

## 2020-08-18 ENCOUNTER — OFFICE VISIT (OUTPATIENT)
Dept: PHYSICAL THERAPY | Facility: CLINIC | Age: 60
End: 2020-08-18
Payer: COMMERCIAL

## 2020-08-18 DIAGNOSIS — S83.241A OTHER TEAR OF MEDIAL MENISCUS, CURRENT INJURY, RIGHT KNEE, INITIAL ENCOUNTER: Primary | ICD-10-CM

## 2020-08-18 PROCEDURE — 97140 MANUAL THERAPY 1/> REGIONS: CPT | Performed by: PHYSICAL THERAPIST

## 2020-08-18 PROCEDURE — 97110 THERAPEUTIC EXERCISES: CPT | Performed by: PHYSICAL THERAPIST

## 2020-08-18 NOTE — PROGRESS NOTES
Daily Note     Today's date: 2020  Patient name: Carolyne Rizo  : 1960  MRN: [id number]  Referring provider: Shruthi Chow  Dx:   Encounter Diagnosis     ICD-10-CM    1  Other tear of medial meniscus, current injury, right knee, initial encounter  S41 874B                   Subjective: Pt reported that he had to walk increased distances yesterday for a work emergency, however stated that he did not experience any R knee pain  However he did report onset of R plantar fascial pain  Objective: See treatment diary below      Assessment: Tolerated treatment well  Patient demonstrated fatigue post treatment, exhibited good technique with therapeutic exercises and would benefit from continued PT  Plan: Continue per plan of care        Precautions: post surgical wrap to be removed , falls risk post op      Manuals 7/ 2 7/6 7/8 7/23 7/27 7/30 8/3 8/10 8/13 8/18   PROM stretch, jt mobs, STM patellar tendon JZ JZ JZ JZ JZ JZ JZ JZ JZ JZ                                          Neuro Re-Ed 7/2 7/6 7/8 7/23 7/27 7/30 8/3 8/10 8/13 8/18   Quadset             SLR, quad set             clamshell             LAQ+             SLS   :15x5      :15x5                              Ther Ex 7/2 7/6 7/8 7/23 7/27 7/30 8/3 8/10 8/13 8/18   SLR 3# 3x10ea   3# 3x10 3# 3x10 3# 3x10 3# 3x10 3# 3x10 3# 3x10    Quad set             Supine active HS stretch             Seated hamstring stretch    :15x5 :15x5 :15x5 Std :15x5      Heel slides             S/l hip abd     3x10 3x10 3x10  3x10 3x10   SAQ             ITB stretch strap :15x5 :15x3  :15x5 :15x5 :15x5 Std :15x5 :15x5 :15x5 :15x5   Hamstring stretch strap :15x5 :15x3  :15x5 :15x5 :15x5       Adductor stretch strap     :15x5 :15x5 Std :15x5      Calf stretch step        :15x3 :15x3    Calf press        50/ 3x10 50/ 3x10 50/ 3x10   U/l HR        3x10ea 3x10 3x10   TR back to wall             PB hamstring curl u/l             Leg press u/l shallow range B/l 90/ 3x10  B/l 90/ 3x10 B/l 90/3x10   Bike 10' 10' lvl2 10' lvl3 5'  8' 5'  5'      Bridge w/ TB abd  3x15 B 3x15     Heel 3x10   Heel 3x10   S/l clamshell       G 3x10      PKFS :15x5ea :15x5 :15x5 pain :15x3 :15x5  :15x4 :15x5 :15x5   Retro walk  35/ 2x10 25/ 3x10    45/ 3x10      LAQ             Stand HS curl 6# 3x10  7 5# 3x10  3x10   7 5# 3x10 7 5# 3x10    Step ups  4" 3x10ea 4" 3x10 4" 3x10          EFX   2'            Kayla kick back      11/ 3x10       [place] forward kick    8/ 3x10  8/ 3x10       TKE             Ther Activity                                       Gait Training                                       Modalities 7/2 7/6 7/8 7/23 7/27 7/30 8/3 8/10 8/13 8/18   MH             CT             Estim             Vaso   10 10 10

## 2020-08-20 ENCOUNTER — APPOINTMENT (OUTPATIENT)
Dept: PHYSICAL THERAPY | Facility: CLINIC | Age: 60
End: 2020-08-20
Payer: COMMERCIAL

## 2020-08-24 ENCOUNTER — OFFICE VISIT (OUTPATIENT)
Dept: PHYSICAL THERAPY | Facility: CLINIC | Age: 60
End: 2020-08-24
Payer: COMMERCIAL

## 2020-08-24 DIAGNOSIS — S83.241A OTHER TEAR OF MEDIAL MENISCUS, CURRENT INJURY, RIGHT KNEE, INITIAL ENCOUNTER: Primary | ICD-10-CM

## 2020-08-24 PROCEDURE — 97110 THERAPEUTIC EXERCISES: CPT | Performed by: PHYSICAL THERAPIST

## 2020-08-24 PROCEDURE — 97140 MANUAL THERAPY 1/> REGIONS: CPT | Performed by: PHYSICAL THERAPIST

## 2020-08-24 NOTE — PROGRESS NOTES
Daily Note     Today's date: 2020  Patient name: Con Corea  : 1960  MRN: [id number]  Referring provider: Calixto Castellanos  Dx:   Encounter Diagnosis     ICD-10-CM    1  Other tear of medial meniscus, current injury, right knee, initial encounter  [id number]                    Subjective: Pt reported that he is improving with his strength as the sessions continue, however stated that he will likely be taking a break from PT due to time restraints with return to work  Objective: See treatment diary below      Assessment: Tolerated treatment well  Patient demonstrated fatigue post treatment and exhibited good technique with therapeutic exercises  Would benefit from further PT, however he will be holding PT due to time restraints  Plan: Continue per plan of care        Precautions: post surgical wrap to be removed , falls risk post op      Manuals    PROM stretch, jt mobs, STM patellar tendon JZ         JZ                                          Neuro Re-Ed    Quadset             SLR, quad set             clamshell             LAQ+             SLS         :15x5                              Ther Ex    SLR 3#3x10        3# 3x10    Quad set             Supine active HS stretch             Seated hamstring stretch             Heel slides             S/l hip abd 3x10        3x10 3x10   SAQ             ITB stretch strap :15x5        :15x5 :15x5   Hamstring stretch strap             Adductor stretch strap             Calf stretch step :15x5        :15x3    Calf press 50/ 3x10        50/ 3x10 50/ 3x10   U/l HR 3x10        3x10 3x10   TR back to wall             PB hamstring curl u/l             Leg press u/l shallow range B/l 130/ 3x10        B/l 90/ 3x10 B/l 90/3x10   Bike             Bridge w/ TB abd          Heel 3x10   S/l clamshell             PKFS :15x5        :15x5 :15x5   Retro walk             LAQ             Stand HS curl 7 5# 3x10    Step ups              EFX              Kayla kick back             [place] forward kick             TKE             Ther Activity 8/24                                      Gait Training                                       Modalities 8/24 8/13 8/18   [address]             CT             Estim             Vaso

## 2020-08-27 ENCOUNTER — APPOINTMENT (OUTPATIENT)
Dept: PHYSICAL THERAPY | Facility: CLINIC | Age: 60
End: 2020-08-27
Payer: COMMERCIAL

## 2020-09-11 ENCOUNTER — OFFICE VISIT (OUTPATIENT)
Dept: OBGYN CLINIC | Facility: CLINIC | Age: 60
End: 2020-09-11
Payer: COMMERCIAL

## 2020-09-11 VITALS
HEIGHT: 67 IN | HEART RATE: 66 BPM | DIASTOLIC BLOOD PRESSURE: 84 MMHG | BODY MASS INDEX: 38.45 KG/M2 | TEMPERATURE: 98.4 F | SYSTOLIC BLOOD PRESSURE: 136 MMHG | WEIGHT: 245 LBS

## 2020-09-11 DIAGNOSIS — S83.231A COMPLEX TEAR OF MEDIAL MENISCUS OF RIGHT KNEE, UNSPECIFIED WHETHER OLD OR CURRENT TEAR, INITIAL ENCOUNTER: Primary | ICD-10-CM

## 2020-09-11 PROCEDURE — 3075F SYST BP GE 130 - 139MM HG: CPT | Performed by: ORTHOPAEDIC SURGERY

## 2020-09-11 PROCEDURE — 1036F TOBACCO NON-USER: CPT | Performed by: ORTHOPAEDIC SURGERY

## 2020-09-11 PROCEDURE — 99213 OFFICE O/P EST LOW 20 MIN: CPT | Performed by: ORTHOPAEDIC SURGERY

## 2020-09-11 PROCEDURE — 3079F DIAST BP 80-89 MM HG: CPT | Performed by: ORTHOPAEDIC SURGERY

## 2020-09-11 NOTE — PROGRESS NOTES
Knee Post Operative Visit     Assesment:     56 y o  male 3 moths s/p surgical arthroscopy of the right knee with medial meniscectomy, DOS: 5/27/2020, doing well at this time     Plan:    Post-Operative treatment:    Ice to knee for 20 minutes at least 1-2 times daily  Let pain guide gradual return activities  Imaging:    No imaging was available for review today  Weight bearing:  as tolerated     ROM:  Full    Brace:  No brace needed    DVT Prophylaxis:  Ambulation    Follow up: If any changes       Patient was advised that if they have any fevers, chills, chest pain, shortness of breath, redness or drainage from the incision, please let our office know immediately  Chief Complaint   Patient presents with    Right Knee - Post-op       History of Present Illness: The patient is a 56 y o  male who is being evaluated post operatively 3 moths s/p surgical arthroscopy of the right knee with medial meniscectomy,     Since the prior visit, He reports significant improvement  Pain is well controlled  The patient is using ice to control swelling  They have started physical therapy and has completed it at this time  The patient Ambulation for DVT ppx  The patient has been ambulating without crutches  The patient has been ambulating without a brace  The patient denies any fevers, chills, calf pain, chest pain/shortness of breath, redness or drainage from the incision  I have reviewed the past medical, surgical, social and family history, medications and allergies as documented in the EMR  Review of systems: ROS is negative other than that noted in the HPI  Constitutional: Negative for fatigue and fever  Physical Exam:    Blood pressure 136/84, pulse 66, temperature 98 4 °F (36 9 °C), height 5' 7" (1 702 m), weight 111 kg (245 lb)      General/Constitutional: NAD, well developed, well nourished  HENT: Normocephalic, atraumatic  CV: Intact distal pulses, regular rate  Resp: No respiratory distress or labored breathing  Lymphatic: No lymphadenopathy palpated  Neuro: Alert and Oriented x 3, no focal deficits  Psych: Normal mood, normal affect, normal judgement, normal behavior  Skin: Warm, dry, no rashes, no erythema    Knee Exam (focused):              RIGHT LEFT   ROM:   0-130 0-130   Palpation: Effusion negative negative     MJL tenderness Negative Negative     LJL tenderness Negative Negative   Instability: Varus stable stable     Valgus stable stable   Special Tests: Lachman Negative Negative     Posterior drawer Negative Negative     Anterior drawer Negative Negative     Pivot shift not tested not tested     Dial not tested not tested   Patella: Palpation no tenderness no tenderness     Mobility 1/4 1/4     Apprehension Negative Negative   Other: Single leg 1/4 squat not tested not tested      Incisions show no erythema, no drainage    LE NV Exam: +2 DP/PT pulses bilaterally  Sensation intact to light touch L2-S1 bilaterally     Bilateral hip ROM demonstrates no pain actively or passively    No calf tenderness to palpation bilaterally    Scribe Attestation    I,:   Manpreet Styles am acting as a scribe while in the presence of the attending physician :        I,:   Ila Bonilla DO personally performed the services described in this documentation    as scribed in my presence :

## 2020-09-11 NOTE — LETTER
September 11, 2020     Patient: Sotero Ocamop   YOB: 1960   Date of Visit: 9/11/2020       To Whom it May Concern:    Josi Travismars is under my professional care  He was seen in my office on 9/11/2020  He may return to work with no restrictions on 9/14/2020  If you have any questions or concerns, please don't hesitate to call  Sincerely,          Velia Resendiz,         CC: Mathieu Fischer

## 2020-09-24 DIAGNOSIS — G40.909 SEIZURE DISORDER (HCC): ICD-10-CM

## 2020-09-24 RX ORDER — PHENYTOIN SODIUM 100 MG/1
CAPSULE, EXTENDED RELEASE ORAL
Qty: 90 CAPSULE | Refills: 3 | Status: SHIPPED | OUTPATIENT
Start: 2020-09-24 | End: 2020-10-21 | Stop reason: SDUPTHER

## 2020-10-06 ENCOUNTER — TRANSCRIBE ORDERS (OUTPATIENT)
Dept: ADMINISTRATIVE | Facility: HOSPITAL | Age: 60
End: 2020-10-06

## 2020-10-06 DIAGNOSIS — C64.9 RENAL CARCINOMA, UNSPECIFIED LATERALITY (HCC): Primary | ICD-10-CM

## 2020-10-14 ENCOUNTER — HOSPITAL ENCOUNTER (OUTPATIENT)
Dept: CT IMAGING | Facility: HOSPITAL | Age: 60
Discharge: HOME | End: 2020-10-14
Payer: COMMERCIAL

## 2020-10-14 ENCOUNTER — HOSPITAL ENCOUNTER (OUTPATIENT)
Dept: RADIOLOGY | Facility: HOSPITAL | Age: 60
Discharge: HOME | End: 2020-10-14
Payer: COMMERCIAL

## 2020-10-14 DIAGNOSIS — C64.9 RENAL CARCINOMA, UNSPECIFIED LATERALITY (HCC): ICD-10-CM

## 2020-10-14 PROCEDURE — 71046 X-RAY EXAM CHEST 2 VIEWS: CPT

## 2020-10-14 PROCEDURE — G1004 CDSM NDSC: HCPCS

## 2020-10-14 PROCEDURE — 74150 CT ABDOMEN W/O CONTRAST: CPT

## 2020-10-21 DIAGNOSIS — G40.909 SEIZURE DISORDER (HCC): ICD-10-CM

## 2020-10-29 RX ORDER — PHENYTOIN SODIUM 100 MG/1
100 CAPSULE, EXTENDED RELEASE ORAL 3 TIMES DAILY
Qty: 90 CAPSULE | Refills: 3 | Status: SHIPPED | OUTPATIENT
Start: 2020-10-29 | End: 2021-07-12

## 2020-11-11 ENCOUNTER — TELEPHONE (OUTPATIENT)
Dept: FAMILY MEDICINE CLINIC | Facility: CLINIC | Age: 60
End: 2020-11-11

## 2020-12-15 ENCOUNTER — OFFICE VISIT (OUTPATIENT)
Dept: FAMILY MEDICINE CLINIC | Facility: CLINIC | Age: 60
End: 2020-12-15
Payer: COMMERCIAL

## 2020-12-15 VITALS
OXYGEN SATURATION: 97 % | DIASTOLIC BLOOD PRESSURE: 90 MMHG | TEMPERATURE: 97.2 F | HEART RATE: 78 BPM | SYSTOLIC BLOOD PRESSURE: 130 MMHG | BODY MASS INDEX: 40.02 KG/M2 | WEIGHT: 255 LBS | HEIGHT: 67 IN

## 2020-12-15 DIAGNOSIS — N18.31 STAGE 3A CHRONIC KIDNEY DISEASE (HCC): ICD-10-CM

## 2020-12-15 DIAGNOSIS — Z12.5 SCREENING FOR PROSTATE CANCER: ICD-10-CM

## 2020-12-15 DIAGNOSIS — Z12.11 SCREENING FOR COLON CANCER: ICD-10-CM

## 2020-12-15 DIAGNOSIS — Z13.29 SCREENING FOR THYROID DISORDER: ICD-10-CM

## 2020-12-15 DIAGNOSIS — Z90.5 H/O UNILATERAL NEPHRECTOMY: ICD-10-CM

## 2020-12-15 DIAGNOSIS — Z23 NEED FOR VACCINATION: ICD-10-CM

## 2020-12-15 DIAGNOSIS — I10 BENIGN ESSENTIAL HYPERTENSION: Primary | ICD-10-CM

## 2020-12-15 DIAGNOSIS — Z13.220 SCREENING FOR LIPID DISORDERS: ICD-10-CM

## 2020-12-15 PROCEDURE — 3080F DIAST BP >= 90 MM HG: CPT | Performed by: FAMILY MEDICINE

## 2020-12-15 PROCEDURE — 3008F BODY MASS INDEX DOCD: CPT | Performed by: FAMILY MEDICINE

## 2020-12-15 PROCEDURE — 1036F TOBACCO NON-USER: CPT | Performed by: FAMILY MEDICINE

## 2020-12-15 PROCEDURE — 90715 TDAP VACCINE 7 YRS/> IM: CPT

## 2020-12-15 PROCEDURE — 99214 OFFICE O/P EST MOD 30 MIN: CPT | Performed by: FAMILY MEDICINE

## 2020-12-15 PROCEDURE — 3075F SYST BP GE 130 - 139MM HG: CPT | Performed by: FAMILY MEDICINE

## 2020-12-15 PROCEDURE — 3725F SCREEN DEPRESSION PERFORMED: CPT | Performed by: FAMILY MEDICINE

## 2020-12-15 PROCEDURE — 90471 IMMUNIZATION ADMIN: CPT

## 2020-12-15 RX ORDER — LOSARTAN POTASSIUM 50 MG/1
50 TABLET ORAL DAILY
Qty: 90 TABLET | Refills: 0 | Status: SHIPPED | OUTPATIENT
Start: 2020-12-15 | End: 2021-01-06

## 2021-01-06 DIAGNOSIS — I10 BENIGN ESSENTIAL HYPERTENSION: ICD-10-CM

## 2021-01-06 RX ORDER — LOSARTAN POTASSIUM 50 MG/1
TABLET ORAL
Qty: 30 TABLET | Refills: 2 | Status: SHIPPED | OUTPATIENT
Start: 2021-01-06 | End: 2021-04-12

## 2021-01-13 ENCOUNTER — CLINICAL SUPPORT (OUTPATIENT)
Dept: FAMILY MEDICINE CLINIC | Facility: CLINIC | Age: 61
End: 2021-01-13

## 2021-01-13 DIAGNOSIS — Z13.228 SCREENING FOR ENDOCRINE, NUTRITIONAL, METABOLIC AND IMMUNITY DISORDER: ICD-10-CM

## 2021-01-13 DIAGNOSIS — I10 BENIGN ESSENTIAL HYPERTENSION: Primary | ICD-10-CM

## 2021-01-13 DIAGNOSIS — Z13.21 SCREENING FOR ENDOCRINE, NUTRITIONAL, METABOLIC AND IMMUNITY DISORDER: ICD-10-CM

## 2021-01-13 DIAGNOSIS — E66.9 OBESITY (BMI 35.0-39.9 WITHOUT COMORBIDITY): ICD-10-CM

## 2021-01-13 DIAGNOSIS — Z13.29 SCREENING FOR ENDOCRINE, NUTRITIONAL, METABOLIC AND IMMUNITY DISORDER: ICD-10-CM

## 2021-01-13 DIAGNOSIS — Z13.0 SCREENING FOR DEFICIENCY ANEMIA: ICD-10-CM

## 2021-01-13 DIAGNOSIS — N18.31 STAGE 3A CHRONIC KIDNEY DISEASE (HCC): ICD-10-CM

## 2021-01-13 DIAGNOSIS — Z13.29 SCREENING FOR THYROID DISORDER: ICD-10-CM

## 2021-01-13 DIAGNOSIS — Z85.46 HISTORY OF PROSTATE CANCER: ICD-10-CM

## 2021-01-13 DIAGNOSIS — E78.2 MIXED HYPERLIPIDEMIA: ICD-10-CM

## 2021-01-13 DIAGNOSIS — Z13.0 SCREENING FOR ENDOCRINE, NUTRITIONAL, METABOLIC AND IMMUNITY DISORDER: ICD-10-CM

## 2021-01-14 ENCOUNTER — TELEPHONE (OUTPATIENT)
Dept: FAMILY MEDICINE CLINIC | Facility: CLINIC | Age: 61
End: 2021-01-14

## 2021-01-14 LAB
ALBUMIN SERPL-MCNC: 4.4 G/DL (ref 3.8–4.8)
ALBUMIN/GLOB SERPL: 1.6 {RATIO} (ref 1.2–2.2)
ALP SERPL-CCNC: 99 IU/L (ref 39–117)
ALT SERPL-CCNC: 24 IU/L (ref 0–44)
AST SERPL-CCNC: 21 IU/L (ref 0–40)
BASOPHILS # BLD AUTO: 0 X10E3/UL (ref 0–0.2)
BASOPHILS NFR BLD AUTO: 1 %
BILIRUB SERPL-MCNC: 0.3 MG/DL (ref 0–1.2)
BUN SERPL-MCNC: 15 MG/DL (ref 8–27)
BUN/CREAT SERPL: 12 (ref 10–24)
CALCIUM SERPL-MCNC: 9.5 MG/DL (ref 8.6–10.2)
CHLORIDE SERPL-SCNC: 104 MMOL/L (ref 96–106)
CHOLEST SERPL-MCNC: 242 MG/DL (ref 100–199)
CO2 SERPL-SCNC: 27 MMOL/L (ref 20–29)
CREAT SERPL-MCNC: 1.22 MG/DL (ref 0.76–1.27)
EOSINOPHIL # BLD AUTO: 0.2 X10E3/UL (ref 0–0.4)
EOSINOPHIL NFR BLD AUTO: 3 %
ERYTHROCYTE [DISTWIDTH] IN BLOOD BY AUTOMATED COUNT: 13.1 % (ref 11.6–15.4)
GLOBULIN SER-MCNC: 2.7 G/DL (ref 1.5–4.5)
GLUCOSE SERPL-MCNC: 99 MG/DL (ref 65–99)
HCT VFR BLD AUTO: 49.8 % (ref 37.5–51)
HDLC SERPL-MCNC: 46 MG/DL
HGB BLD-MCNC: 16.9 G/DL (ref 13–17.7)
IMM GRANULOCYTES # BLD: 0 X10E3/UL (ref 0–0.1)
IMM GRANULOCYTES NFR BLD: 0 %
LDLC SERPL CALC-MCNC: 157 MG/DL (ref 0–99)
LDLC/HDLC SERPL: 3.4 RATIO (ref 0–3.6)
LYMPHOCYTES # BLD AUTO: 1.7 X10E3/UL (ref 0.7–3.1)
LYMPHOCYTES NFR BLD AUTO: 31 %
MCH RBC QN AUTO: 30.1 PG (ref 26.6–33)
MCHC RBC AUTO-ENTMCNC: 33.9 G/DL (ref 31.5–35.7)
MCV RBC AUTO: 89 FL (ref 79–97)
MONOCYTES # BLD AUTO: 0.4 X10E3/UL (ref 0.1–0.9)
MONOCYTES NFR BLD AUTO: 8 %
NEUTROPHILS # BLD AUTO: 3.1 X10E3/UL (ref 1.4–7)
NEUTROPHILS NFR BLD AUTO: 57 %
PLATELET # BLD AUTO: 285 X10E3/UL (ref 150–450)
POTASSIUM SERPL-SCNC: 5.1 MMOL/L (ref 3.5–5.2)
PROT SERPL-MCNC: 7.1 G/DL (ref 6–8.5)
PSA FREE MFR SERPL: 6.7 %
PSA FREE SERPL-MCNC: 0.02 NG/ML
PSA SERPL-MCNC: 0.3 NG/ML (ref 0–4)
RBC # BLD AUTO: 5.62 X10E6/UL (ref 4.14–5.8)
SL AMB EGFR AFRICAN AMERICAN: 73 ML/MIN/1.73
SL AMB EGFR NON AFRICAN AMERICAN: 63 ML/MIN/1.73
SL AMB VLDL CHOLESTEROL CALC: 39 MG/DL (ref 5–40)
SODIUM SERPL-SCNC: 138 MMOL/L (ref 134–144)
TRIGL SERPL-MCNC: 213 MG/DL (ref 0–149)
TSH SERPL DL<=0.005 MIU/L-ACNC: 1.46 UIU/ML (ref 0.45–4.5)
WBC # BLD AUTO: 5.5 X10E3/UL (ref 3.4–10.8)

## 2021-01-14 NOTE — TELEPHONE ENCOUNTER
----- Message from [facility], DO sent at 1/14/2021  1:00 PM EST -----  Labs are ok except lipids are elevated  Watch diet   We will discuss meds at next ov

## 2021-01-21 ENCOUNTER — OFFICE VISIT (OUTPATIENT)
Dept: FAMILY MEDICINE CLINIC | Facility: CLINIC | Age: 61
End: 2021-01-21
Payer: COMMERCIAL

## 2021-01-21 VITALS
SYSTOLIC BLOOD PRESSURE: 146 MMHG | TEMPERATURE: 97.3 F | OXYGEN SATURATION: 97 % | HEIGHT: 67 IN | BODY MASS INDEX: 39.51 KG/M2 | WEIGHT: 251.75 LBS | DIASTOLIC BLOOD PRESSURE: 90 MMHG | HEART RATE: 73 BPM

## 2021-01-21 DIAGNOSIS — Z00.00 ANNUAL PHYSICAL EXAM: Primary | ICD-10-CM

## 2021-01-21 DIAGNOSIS — E78.2 MIXED HYPERLIPIDEMIA: ICD-10-CM

## 2021-01-21 DIAGNOSIS — Z85.46 HISTORY OF PROSTATE CANCER: ICD-10-CM

## 2021-01-21 DIAGNOSIS — Z12.11 ENCOUNTER FOR SCREENING COLONOSCOPY: ICD-10-CM

## 2021-01-21 PROCEDURE — 99396 PREV VISIT EST AGE 40-64: CPT | Performed by: FAMILY MEDICINE

## 2021-01-21 PROCEDURE — 3008F BODY MASS INDEX DOCD: CPT | Performed by: FAMILY MEDICINE

## 2021-01-21 PROCEDURE — 3080F DIAST BP >= 90 MM HG: CPT | Performed by: FAMILY MEDICINE

## 2021-01-21 PROCEDURE — 3077F SYST BP >= 140 MM HG: CPT | Performed by: FAMILY MEDICINE

## 2021-01-21 PROCEDURE — 1036F TOBACCO NON-USER: CPT | Performed by: FAMILY MEDICINE

## 2021-01-21 RX ORDER — ATORVASTATIN CALCIUM 20 MG/1
20 TABLET, FILM COATED ORAL DAILY
Qty: 90 TABLET | Refills: 3 | Status: SHIPPED | OUTPATIENT
Start: 2021-01-21 | End: 2021-03-19 | Stop reason: ALTCHOICE

## 2021-01-21 NOTE — PATIENT INSTRUCTIONS

## 2021-01-21 NOTE — PROGRESS NOTES
Renae Siu is a 56 y o   male and is here for routine health maintenance  History of Present Illness     PATIENT HERE FOR ANNUAL PHYSICAL  REPORT RIGHT EAR FULLNESS  USING DEBROX EAR DROPS  RIGHT MEDIAL KNEE PAIN X1 MONTH  HX OF MENISCAL TEAR S/P REPAIR  Well Adult Physical   Patient here for a comprehensive physical exam       Diet and Physical Activity  Diet: poor diet  Weight concerns: Patient has class 2 obesity (BMI 35 0-39  9)  Exercise: rarely      Depression Screen  PHQ-9 Depression Screening    PHQ-9:   Frequency of the following problems over the past two weeks:              General Health  Hearing: Normal:  bilateral  Vision: most recent eye exam >1 year and SNELLEN CHART Q1YR AT WORK  Dental: regular dental visits      Cancer Screening  Colononoscopy WILL SCHEDULE THIS YEAR  PSA 0 3      Smoker NON-SMOKER   Annual screening with low-dose helical computed tomography (CT) for patients age 54 to 76 years with history of smoking at least 30 pack-years and, if a former smoker, had quit within the previous 15 years      The following portions of the patient's history were reviewed and updated as appropriate: allergies, current medications, past family history, past medical history, past social history, past surgical history and problem list     Review of Systems     Review of Systems   Constitutional: Negative  Negative for activity change, appetite change, chills, fatigue and fever  HENT: Negative  Negative for congestion, ear pain, postnasal drip and sinus pain  RIGHT EAR FULLNESS/CERUMEN IMPACTION   Eyes: Negative  Respiratory: Negative  Negative for cough and shortness of breath  Cardiovascular: Negative  Negative for chest pain and leg swelling  Gastrointestinal: Negative  Negative for constipation and diarrhea  Endocrine: Negative  Genitourinary: Negative  Negative for dysuria  Musculoskeletal: Negative           RIGHT MEDIAL KNEE PAIN Skin: Negative  Allergic/Immunologic: Negative  Negative for immunocompromised state  Neurological: Negative  Negative for dizziness and light-headedness  Hematological: Negative  Psychiatric/Behavioral: Negative          Past Medical History     Past Medical History:   Diagnosis Date    Accelerated essential hypertension     Acquired spondylolisthesis     Atypical chest pain     CA of prostate (HCC)     CPAP (continuous positive airway pressure) dependence     De Quervain's tenosynovitis     Epileptic seizure ([address] )     Esophageal reflux     Fatigue     Hematuria     Hyperglycemia     last assessed: 3/28/2013    Hyperlipidemia     Impaired fasting glucose     Lightheadedness     Lower back pain     Lumbar foraminal stenosis     Lumbar spondylosis     Malignant melanoma of skin (HCC)     Microscopic hematuria     last assessed: 6/16/2014    Obesity     Plantar fasciitis     Polyp of sigmoid colon     PONV (postoperative nausea and vomiting)     Renal cell carcinoma, right (HCC)     Right kidney mass     Sleep apnea     Spinal stenosis        Past Surgical History     Past Surgical History:   Procedure Laterality Date    CHOLECYSTECTOMY      HERNIA REPAIR      NEPHRECTOMY Left     DC KNEE SCOPE,MED/LAT MENISECTOMY Right 5/27/2020    Procedure: MEDIAL Menisectomy Right knee;  Surgeon: Yady Gibson DO;  Location:  MAIN OR;  Service: Orthopedics    PROSTATECTOMY         Social History     Social History     Socioeconomic History    Marital status: /Civil Union     Spouse name: None    Number of children: None    Years of education: None    Highest education level: None   Occupational History    None   Social Needs    Financial resource strain: None    Food insecurity     Worry: None     Inability: None    Transportation needs     Medical: None     Non-medical: None   Tobacco Use    Smoking status: Never Smoker    Smokeless tobacco: Never Used    Tobacco comment: nonsmoker   Substance and Sexual Activity    Alcohol use: No    Drug use: No    Sexual activity: None   Lifestyle    Physical activity     Days per week: None     Minutes per session: None    Stress: None   Relationships    Social connections     Talks on phone: None     Gets together: None     Attends Confucianism service: None     Active member of club or organization: None     Attends meetings of clubs or organizations: None     Relationship status: None    Intimate partner violence     Fear of current or ex partner: None     Emotionally abused: None     Physically abused: None     Forced sexual activity: None   Other Topics Concern    None   Social History Narrative    None       Family History     Family History   Problem Relation Age of Onset    Hypertension Mother         essential    Diabetes Father         mellitus    Tremor Father         involuntary shaking or tremblin movements (tremor)    Hypertension Brother         essential       Current Medications       Current Outpatient Medications:     losartan (COZAAR) 50 mg tablet, TAKE 1 TABLET BY MOUTH EVERY DAY, Disp: 30 tablet, Rfl: 2    phenytoin (DILANTIN) 100 mg ER capsule, Take 1 capsule (100 mg total) by mouth 3 (three) times a day, Disp: 90 capsule, Rfl: 3     Allergies     Allergies   Allergen Reactions    Ciprofloxacin Other (See Comments)     Other reaction(s): RAPID IRREGULAR HEART BEAT    Fluticasone-Salmeterol GI Intolerance       Objective     /90   Pulse 73   Temp (!) 97 3 °F (36 3 °C)   Ht 5' 7" (1 702 m)   Wt 114 kg (251 lb 12 oz)   SpO2 97%   BMI 39 43 kg/m²      Physical Exam  Vitals signs and nursing note reviewed  Constitutional:       Appearance: Normal appearance  He is obese  HENT:      Head: Normocephalic and atraumatic  Right Ear: Tympanic membrane, ear canal and external ear normal  There is impacted cerumen        Left Ear: Tympanic membrane, ear canal and external ear normal       Nose: Nose normal       Mouth/Throat:      Mouth: Mucous membranes are moist       Pharynx: Oropharynx is clear  Eyes:      Extraocular Movements: Extraocular movements intact  Conjunctiva/sclera: Conjunctivae normal       Pupils: Pupils are equal, round, and reactive to light  Neck:      Musculoskeletal: Normal range of motion and neck supple  Cardiovascular:      Rate and Rhythm: Normal rate and regular rhythm  Pulses: Normal pulses  Heart sounds: Normal heart sounds  Pulmonary:      Effort: Pulmonary effort is normal       Breath sounds: Normal breath sounds  Abdominal:      General: Bowel sounds are normal       Palpations: Abdomen is soft  Musculoskeletal: Normal range of motion  General: Tenderness present  Comments: RIGHT MEDIAL MENISCAL TENDERNESS  Skin:     General: Skin is warm and dry  Neurological:      General: No focal deficit present  Mental Status: He is alert and oriented to person, place, and time  Psychiatric:         Mood and Affect: Mood normal          Behavior: Behavior normal          Thought Content:  Thought content normal          Judgment: Judgment normal            No exam data present    Health Maintenance     Health Maintenance   Topic Date Due    HIV Screening  06/16/1973    Annual Physical  06/16/1976    Colonoscopy Surveillance  04/25/2019    Influenza Vaccine (1) 06/30/2021 (Originally 9/1/2020)    Depression Screening PHQ  12/15/2021    BMI: Followup Plan  12/15/2021    BMI: Adult  01/21/2022    Colorectal Cancer Screening  04/25/2024    DTaP,Tdap,and Td Vaccines (3 - Td) 12/15/2030    Hepatitis C Screening  Completed    Pneumococcal Vaccine: Pediatrics (0 to 5 Years) and At-Risk Patients (6 to 59 Years)  Aged Out    HIB Vaccine  Aged Out    Hepatitis B Vaccine  Aged Out    IPV Vaccine  Aged Out    Hepatitis A Vaccine  Aged Out    Meningococcal ACWY Vaccine  Aged Out    HPV Vaccine  Aged [employer] History Administered Date(s) Administered    Tdap 08/20/2010, 12/15/2020       Laboratory Results:   Lab Results   Component Value Date    WBC 5 5 01/13/2021    WBC 7 89 06/04/2020    WBC 9 0 12/04/2018    HGB 16 9 01/13/2021    HGB 15 5 06/04/2020    HGB 15 7 12/04/2018    HCT 49 8 01/13/2021    HCT 44 7 06/04/2020    HCT 45 9 12/04/2018    MCV 89 01/13/2021    MCV 89 06/04/2020    MCV 89 12/04/2018     01/13/2021     06/04/2020     12/04/2018     Lab Results   Component Value Date    BUN 15 01/13/2021    BUN 22 06/04/2020    BUN 15 12/04/2018     Lab Results   Component Value Date    GLUC 99 01/13/2021    GLUC 118 06/04/2020    GLUC 97 12/04/2018    ALT 24 01/13/2021    ALT 22 12/04/2018    AST 21 01/13/2021    AST 20 12/04/2018     Lab Results   Component Value Date    TSH 1 460 01/13/2021    TSH 1 680 12/04/2018     Lab Results   Component Value Date    HGBA1C 5 6 03/11/2013       Lipid Profile:   No results found for: CHOL  Lab Results   Component Value Date    HDL 46 01/13/2021    HDL 45 12/04/2018     No results found for: LDLC  Lab Results   Component Value Date    LDLCALC 157 (H) 01/13/2021    LDLCALC 154 (H) 12/04/2018     Lab Results   Component Value Date    TRIG 213 (H) 01/13/2021    TRIG 198 (H) 12/04/2018       Assessment/Plan       1  Healthy male exam   2  Patient Counseling:   · Nutrition: Stressed importance of a well balanced diet, moderation of sodium/saturated fat, caloric balance and sufficient intake of fiber  · Exercise: Stressed the importance of regular exercise with a goal of 150 minutes per week  · Dental Health: Discussed daily flossing and brushing and regular dental visits     · Immunizations reviewed  · Discussed benefits of screening   · Discussed the patient's BMI with him  The BMI is above average; BMI management plan is completed  3  Cancer Screening PT FOLLOWS UP WITH ONCOLOGIST  4  Labs REVEIWED  6   Follow up [place] 2 D Charisse Blizzard

## 2021-02-03 ENCOUNTER — OFFICE VISIT (OUTPATIENT)
Dept: FAMILY MEDICINE CLINIC | Facility: CLINIC | Age: 61
End: 2021-02-03
Payer: COMMERCIAL

## 2021-02-03 VITALS
HEIGHT: 67 IN | DIASTOLIC BLOOD PRESSURE: 90 MMHG | HEART RATE: 73 BPM | BODY MASS INDEX: 40.02 KG/M2 | TEMPERATURE: 98.2 F | SYSTOLIC BLOOD PRESSURE: 146 MMHG | OXYGEN SATURATION: 97 % | WEIGHT: 255 LBS

## 2021-02-03 DIAGNOSIS — H61.21 HEARING LOSS OF RIGHT EAR DUE TO CERUMEN IMPACTION: Primary | ICD-10-CM

## 2021-02-03 DIAGNOSIS — H60.501 ACUTE OTITIS EXTERNA OF RIGHT EAR, UNSPECIFIED TYPE: ICD-10-CM

## 2021-02-03 PROCEDURE — 69209 REMOVE IMPACTED EAR WAX UNI: CPT | Performed by: FAMILY MEDICINE

## 2021-02-03 PROCEDURE — 3725F SCREEN DEPRESSION PERFORMED: CPT | Performed by: FAMILY MEDICINE

## 2021-02-03 PROCEDURE — 99214 OFFICE O/P EST MOD 30 MIN: CPT | Performed by: FAMILY MEDICINE

## 2021-02-03 NOTE — PROGRESS NOTES
Subjective:   Chief Complaint   Patient presents with    fluid in ear     PT COMES IN TODAY FOR FLUID IN RIGHT EAR X 3 WEEKS  Patient ID: Amelia Butler is a 56 y o  male  The patient is being seen today because he feels like his right ears clogged  This is been going on for a few weeks, getting worse not better   He does not have any pain   No drainage from the ear  It just feels very full   He has never had anything like this before  He has tried multiple different things in the ear including an oil drop and another drop he had at home  Nothing has helped  He does use Q-tips  He has never had a problem with wax buildup before      The following portions of the patient's history were reviewed and updated as appropriate: allergies, current medications, past family history, past medical history, past social history, past surgical history and problem list     Review of Systems          Objective:  Vitals:    02/03/21 1330 02/03/21 1334   BP: 150/98 146/90   Pulse: 73    Temp: 98 2 °F (36 8 °C)    SpO2: 97%    Weight: 116 kg (255 lb)    Height: 5' 7" (1 702 m)       Physical Exam  Constitutional:       General: He is not in acute distress  Appearance: Normal appearance  He is not ill-appearing  HENT:      Ears:      Comments:  Cerumen debris in the left ear but not impacted; cerumen impaction in the right ear; after cerumen was lavaged the right ear canal appears inflamed and erythematous  Skin:     General: Skin is warm and dry  Findings: No erythema or rash  Neurological:      General: No focal deficit present  Mental Status: He is alert and oriented to person, place, and time  Cranial Nerves: No cranial nerve deficit  Gait: Gait normal    Psychiatric:         Mood and Affect: Mood normal          Behavior: Behavior normal          Thought Content:  Thought content normal          Judgment: Judgment normal            Ear cerumen removal    Date/Time: 2/3/2021 1:58 PM  Performed by: Kenroy Salmon MD  Authorized by: Kenroy Salmon MD   Universal Protocol:  Consent: Verbal consent obtained  Risks and benefits: risks, benefits and alternatives were discussed  Consent given by: patient  Patient understanding: patient states understanding of the procedure being performed      Procedure details:     Location:  R ear    Procedure type: irrigation only      Approach:  External  Post-procedure details:     Complication:  None    Hearing quality:  Improved    Patient tolerance of procedure: Tolerated well, no immediate complications          Assessment/Plan:    No problem-specific Assessment & Plan notes found for this encounter  I advised the patient against using Q-tips in suggested he get Debrox over-the-counter  I am going to treat him with an antibiotic/steroid drop because of the significant erythema and edema of the ear canal   I suspect this may be related to all of the things he put in his ear plus there was cotton from qtips that was lavaged with the wax         Diagnoses and all orders for this visit:    Hearing loss of right ear due to cerumen impaction  -     Ear cerumen removal    Acute otitis externa of right ear, unspecified type  -     neomycin-polymyxin-hydrocortisone (CORTISPORIN) otic solution; Administer 4 drops to the right ear every 8 (eight) hours

## 2021-03-19 ENCOUNTER — TELEPHONE (OUTPATIENT)
Dept: FAMILY MEDICINE CLINIC | Facility: CLINIC | Age: 61
End: 2021-03-19

## 2021-03-19 ENCOUNTER — OFFICE VISIT (OUTPATIENT)
Dept: FAMILY MEDICINE CLINIC | Facility: CLINIC | Age: 61
End: 2021-03-19
Payer: COMMERCIAL

## 2021-03-19 VITALS
OXYGEN SATURATION: 97 % | HEIGHT: 67 IN | HEART RATE: 65 BPM | WEIGHT: 252 LBS | SYSTOLIC BLOOD PRESSURE: 132 MMHG | BODY MASS INDEX: 39.55 KG/M2 | DIASTOLIC BLOOD PRESSURE: 84 MMHG | TEMPERATURE: 97.9 F

## 2021-03-19 DIAGNOSIS — H62.40 FUNGAL OTITIS EXTERNA: Primary | ICD-10-CM

## 2021-03-19 DIAGNOSIS — B36.9 FUNGAL OTITIS EXTERNA: Primary | ICD-10-CM

## 2021-03-19 PROCEDURE — 1036F TOBACCO NON-USER: CPT | Performed by: FAMILY MEDICINE

## 2021-03-19 PROCEDURE — 3008F BODY MASS INDEX DOCD: CPT | Performed by: FAMILY MEDICINE

## 2021-03-19 PROCEDURE — 99214 OFFICE O/P EST MOD 30 MIN: CPT | Performed by: FAMILY MEDICINE

## 2021-03-19 RX ORDER — FLUCONAZOLE 150 MG/1
150 TABLET ORAL WEEKLY
Qty: 2 TABLET | Refills: 0 | Status: SHIPPED | OUTPATIENT
Start: 2021-03-19 | End: 2021-03-27

## 2021-03-19 RX ORDER — CLOTRIMAZOLE 1 G/ML
1 SOLUTION TOPICAL 2 TIMES DAILY
Qty: 30 ML | Refills: 0 | Status: SHIPPED | OUTPATIENT
Start: 2021-03-19 | End: 2022-05-31

## 2021-03-19 NOTE — PROGRESS NOTES
Subjective:   Chief Complaint   Patient presents with    Earache     PT COMES IN WITH B/L EAR PAIN, RIGHT SIDE RADIATING INTO JAW AND  DECREASED HEARING        Patient ID: Cadence Saxena is a 56 y o  male  The patient is here today because he is still having problems with his years   He was here just over a month ago and I lavaged a significant amount a wax and material that was likely cotton from a Q-tip from both ears   He did feel better initially but has progressively gotten worse again   He feels like he can not hear very much from either ear  The right ear is much worse  He is having pain from the right ear into the jaw   No drainage from the ear   He has been using Debrox      The following portions of the patient's history were reviewed and updated as appropriate: allergies, current medications, past family history, past medical history, past social history, past surgical history and problem list     Review of Systems          Objective:  Vitals:    03/19/21 1003   BP: 132/84   Pulse: 65   Temp: 97 9 °F (36 6 °C)   SpO2: 97%   Weight: 114 kg (252 lb)   Height: 5' 7" (1 702 m)      Physical Exam  Constitutional:       General: He is not in acute distress  Appearance: Normal appearance  He is not ill-appearing  HENT:      Ears:      Comments:  Significant erythema of both ear canals and white patches covering the tympanic membrane, considerably worse on the right than the left  Skin:     General: Skin is warm and dry  Findings: No erythema or rash  Neurological:      General: No focal deficit present  Mental Status: He is alert and oriented to person, place, and time  Cranial Nerves: No cranial nerve deficit  Gait: Gait normal    Psychiatric:         Mood and Affect: Mood normal          Behavior: Behavior normal          Thought Content:  Thought content normal          Judgment: Judgment normal            Assessment/Plan:    No problem-specific Assessment & Plan notes found for this encounter  Diagnoses and all orders for this visit:    Fungal otitis externa  -     clotrimazole 1 % external solution; Apply 1 application topically 2 (two) times a day INTO EACH EAR  -     fluconazole (DIFLUCAN) 150 mg tablet; Take 1 tablet (150 mg total) by mouth once a week for 2 doses         I discussed the diagnosis with the patient  I am going to use a topical and oral antifungal   Hopefully this will cause complete resolution of his symptoms  If not I would then refer him to ENT for further evaluation  I did review his recent lab results and he did have a normal glucose  I was concerned about the possibility of diabetes given the fungal infection  If this does not resolve we may want to check an A1c despite the normal glucose of 99

## 2021-04-10 DIAGNOSIS — I10 BENIGN ESSENTIAL HYPERTENSION: ICD-10-CM

## 2021-04-12 RX ORDER — LOSARTAN POTASSIUM 50 MG/1
50 TABLET ORAL DAILY
Qty: 90 TABLET | Refills: 0 | Status: SHIPPED | OUTPATIENT
Start: 2021-04-12 | End: 2021-07-09

## 2021-07-09 DIAGNOSIS — I10 BENIGN ESSENTIAL HYPERTENSION: ICD-10-CM

## 2021-07-09 RX ORDER — LOSARTAN POTASSIUM 50 MG/1
TABLET ORAL
Qty: 30 TABLET | Refills: 2 | Status: SHIPPED | OUTPATIENT
Start: 2021-07-09 | End: 2021-10-05

## 2021-07-12 DIAGNOSIS — G40.909 SEIZURE DISORDER (HCC): ICD-10-CM

## 2021-07-12 RX ORDER — PHENYTOIN SODIUM 100 MG/1
CAPSULE, EXTENDED RELEASE ORAL
Qty: 90 CAPSULE | Refills: 3 | Status: SHIPPED | OUTPATIENT
Start: 2021-07-12 | End: 2021-11-10

## 2021-07-19 ENCOUNTER — TELEPHONE (OUTPATIENT)
Dept: FAMILY MEDICINE CLINIC | Facility: CLINIC | Age: 61
End: 2021-07-19

## 2021-07-19 DIAGNOSIS — M25.569 ACUTE KNEE PAIN, UNSPECIFIED LATERALITY: Primary | ICD-10-CM

## 2021-07-19 NOTE — TELEPHONE ENCOUNTER
Patient had surgery on his knee by Dr Nella Thompson  He is having problems with it again and is asking if you will put a referral in the system for him to see him  Thank you  Only call if there is a problem

## 2021-07-21 ENCOUNTER — OFFICE VISIT (OUTPATIENT)
Dept: FAMILY MEDICINE CLINIC | Facility: CLINIC | Age: 61
End: 2021-07-21
Payer: COMMERCIAL

## 2021-07-21 VITALS
HEART RATE: 61 BPM | BODY MASS INDEX: 38.3 KG/M2 | OXYGEN SATURATION: 96 % | DIASTOLIC BLOOD PRESSURE: 86 MMHG | WEIGHT: 244 LBS | HEIGHT: 67 IN | SYSTOLIC BLOOD PRESSURE: 140 MMHG

## 2021-07-21 DIAGNOSIS — Z98.890 STATUS POST MEDIAL MENISCECTOMY OF RIGHT KNEE: ICD-10-CM

## 2021-07-21 DIAGNOSIS — G47.33 SEVERE OBSTRUCTIVE SLEEP APNEA: ICD-10-CM

## 2021-07-21 DIAGNOSIS — Z12.11 SCREENING FOR COLON CANCER: ICD-10-CM

## 2021-07-21 DIAGNOSIS — E66.01 CLASS 2 SEVERE OBESITY WITH SERIOUS COMORBIDITY AND BODY MASS INDEX (BMI) OF 38.0 TO 38.9 IN ADULT, UNSPECIFIED OBESITY TYPE (HCC): Primary | ICD-10-CM

## 2021-07-21 DIAGNOSIS — I10 BENIGN ESSENTIAL HYPERTENSION: ICD-10-CM

## 2021-07-21 DIAGNOSIS — Z99.89 CPAP (CONTINUOUS POSITIVE AIRWAY PRESSURE) DEPENDENCE: ICD-10-CM

## 2021-07-21 DIAGNOSIS — E78.2 MIXED HYPERLIPIDEMIA: ICD-10-CM

## 2021-07-21 PROCEDURE — 99214 OFFICE O/P EST MOD 30 MIN: CPT | Performed by: FAMILY MEDICINE

## 2021-07-21 NOTE — PATIENT INSTRUCTIONS
Obesity   AMBULATORY CARE:   Obesity  is when your body mass index (BMI) is greater than 30  Your healthcare provider will use your height and weight to measure your BMI  The risks of obesity include  many health problems, such as injuries or physical disability  You may need tests to check for the following:  · Diabetes    · High blood pressure or high cholesterol    · Heart disease    · Gallbladder or liver disease    · Cancer of the colon, breast, prostate, liver, or kidney    · Sleep apnea    · Arthritis or gout    Seek care immediately if:   · You have a severe headache, confusion, or difficulty speaking  · You have weakness on one side of your body  · You have chest pain, sweating, or shortness of breath  Contact your healthcare provider if:   · You have symptoms of gallbladder or liver disease, such as pain in your upper abdomen  · You have knee or hip pain and discomfort while walking  · You have symptoms of diabetes, such as intense hunger and thirst, and frequent urination  · You have symptoms of sleep apnea, such as snoring or daytime sleepiness  · You have questions or concerns about your condition or care  Treatment for obesity  focuses on helping you lose weight to improve your health  Even a small decrease in BMI can reduce the risk for many health problems  Your healthcare provider will help you set a weight-loss goal   · Lifestyle changes  are the first step in treating obesity  These include making healthy food choices and getting regular physical activity  Your healthcare provider may suggest a weight-loss program that involves coaching, education, and therapy  · Medicine  may help you lose weight when it is used with a healthy diet and physical activity  · Surgery  can help you lose weight if you are very obese and have other health problems  There are several types of weight-loss surgery  Ask your healthcare provider for more information      Be successful losing weight:   · Set small, realistic goals  An example of a small goal is to walk for 20 minutes 5 days a week  Anther goal is to lose 5% of your body weight  · Tell friends, family members, and coworkers about your goals  and ask for their support  Ask a friend to lose weight with you, or join a weight-loss support group  · Identify foods or triggers that may cause you to overeat , and find ways to avoid them  Remove tempting high-calorie foods from your home and workplace  Place a bowl of fresh fruit on your kitchen counter  If stress causes you to eat, then find other ways to cope with stress  · Keep a diary to track what you eat and drink  Also write down how many minutes of physical activity you do each day  Weigh yourself once a week and record it in your diary  Eating changes: You will need to eat 500 to 1,000 fewer calories each day than you currently eat to lose 1 to 2 pounds a week  The following changes will help you cut calories:  · Eat smaller portions  Use small plates, no larger than 9 inches in diameter  Fill your plate half full of fruits and vegetables  Measure your food using measuring cups until you know what a serving size looks like  · Eat 3 meals and 1 or 2 snacks each day  Plan your meals in advance  Amy Herrera and eat at home most of the time  Eat slowly  Do not skip meals  Skipping meals can lead to overeating later in the day  This can make it harder for you to lose weight  Talk with a dietitian to help you make a meal plan and schedule that is right for you  · Eat fruits and vegetables at every meal   They are low in calories and high in fiber, which makes you feel full  Do not add butter, margarine, or cream sauce to vegetables  Use herbs to season steamed vegetables  · Eat less fat and fewer fried foods  Eat more baked or grilled chicken and fish  These protein sources are lower in calories and fat than red meat  Limit fast food   Dress your salads with olive oil and vinegar instead of bottled dressing  · Limit the amount of sugar you eat  Do not drink sugary beverages  Limit alcohol  Activity changes:  Physical activity is good for your body in many ways  It helps you burn calories and build strong muscles  It decreases stress and depression, and improves your mood  It can also help you sleep better  Talk to your healthcare provider before you begin an exercise program   · Exercise for at least 30 minutes 5 days a week  Start slowly  Set aside time each day for physical activity that you enjoy and that is convenient for you  It is best to do both weight training and an activity that increases your heart rate, such as walking, bicycling, or swimming  · Find ways to be more active  Do yard work and housecleaning  Walk up the stairs instead of using elevators  Spend your leisure time going to events that require walking, such as outdoor festivals or fairs  This extra physical activity can help you lose weight and keep it off  Follow up with your healthcare provider as directed: You may need to meet with a dietitian  Write down your questions so you remember to ask them during your visits  © Copyright [employer] 2021 Information is for End User's use only and may not be sold, redistributed or otherwise used for commercial purposes  All illustrations and images included in CareNotes® are the copyrighted property of [employer]  or Radha Eugene  The above information is an  only  It is not intended as medical advice for individual conditions or treatments  Talk to your doctor, nurse or pharmacist before following any medical regimen to see if it is safe and effective for you

## 2021-07-21 NOTE — ASSESSMENT & PLAN NOTE
Repeat Lipid panel  Hesitant to start statins due to side effects  Discussed other rx options  Will get labwork and discuss future plan of care    Continue dietary modification

## 2021-07-21 NOTE — ASSESSMENT & PLAN NOTE
Right knee injury this weekend running after dog  Appointment Monday with ortho  Will use voltaren getl 1% 4g apply topically to right knee for pain relief

## 2021-07-26 ENCOUNTER — OFFICE VISIT (OUTPATIENT)
Dept: OBGYN CLINIC | Facility: MEDICAL CENTER | Age: 61
End: 2021-07-26
Payer: COMMERCIAL

## 2021-07-26 VITALS
BODY MASS INDEX: 38.3 KG/M2 | HEIGHT: 67 IN | DIASTOLIC BLOOD PRESSURE: 75 MMHG | WEIGHT: 244 LBS | HEART RATE: 71 BPM | SYSTOLIC BLOOD PRESSURE: 137 MMHG

## 2021-07-26 DIAGNOSIS — M17.11 PRIMARY OSTEOARTHRITIS OF RIGHT KNEE: Primary | ICD-10-CM

## 2021-07-26 DIAGNOSIS — M25.569 ACUTE KNEE PAIN, UNSPECIFIED LATERALITY: ICD-10-CM

## 2021-07-26 DIAGNOSIS — S83.231A COMPLEX TEAR OF MEDIAL MENISCUS OF RIGHT KNEE, UNSPECIFIED WHETHER OLD OR CURRENT TEAR, INITIAL ENCOUNTER: ICD-10-CM

## 2021-07-26 PROCEDURE — 3008F BODY MASS INDEX DOCD: CPT | Performed by: ORTHOPAEDIC SURGERY

## 2021-07-26 PROCEDURE — 1036F TOBACCO NON-USER: CPT | Performed by: ORTHOPAEDIC SURGERY

## 2021-07-26 PROCEDURE — 3078F DIAST BP <80 MM HG: CPT | Performed by: ORTHOPAEDIC SURGERY

## 2021-07-26 PROCEDURE — 99213 OFFICE O/P EST LOW 20 MIN: CPT | Performed by: ORTHOPAEDIC SURGERY

## 2021-07-26 PROCEDURE — 3075F SYST BP GE 130 - 139MM HG: CPT | Performed by: ORTHOPAEDIC SURGERY

## 2021-07-26 PROCEDURE — 20610 DRAIN/INJ JOINT/BURSA W/O US: CPT | Performed by: ORTHOPAEDIC SURGERY

## 2021-07-26 RX ORDER — BETAMETHASONE SODIUM PHOSPHATE AND BETAMETHASONE ACETATE 3; 3 MG/ML; MG/ML
6 INJECTION, SUSPENSION INTRA-ARTICULAR; INTRALESIONAL; INTRAMUSCULAR; SOFT TISSUE
Status: COMPLETED | OUTPATIENT
Start: 2021-07-26 | End: 2021-07-26

## 2021-07-26 RX ORDER — LIDOCAINE HYDROCHLORIDE 10 MG/ML
3 INJECTION, SOLUTION INFILTRATION; PERINEURAL
Status: COMPLETED | OUTPATIENT
Start: 2021-07-26 | End: 2021-07-26

## 2021-07-26 RX ADMIN — BETAMETHASONE SODIUM PHOSPHATE AND BETAMETHASONE ACETATE 6 MG: 3; 3 INJECTION, SUSPENSION INTRA-ARTICULAR; INTRALESIONAL; INTRAMUSCULAR; SOFT TISSUE at 15:04

## 2021-07-26 RX ADMIN — LIDOCAINE HYDROCHLORIDE 3 ML: 10 INJECTION, SOLUTION INFILTRATION; PERINEURAL at 14:23

## 2021-07-26 NOTE — PROGRESS NOTES
Ortho Sports Medicine Knee Follow Up Visit     Assesment:   59 y o  male right knee mild medial joint OA    Plan:    Conservative treatment:    Ice to knee for 20 minutes at least 1-2 times daily  PT for ROM/strengthening to knee, hip and core  Imaging: All imaging from today was reviewed by myself and explained to the patient  Possible MRI if no improvement over next few weeks     Injection:    The risks and benefits of the injection (which include but are not limited to: infection, bleeding,damage to nerve/artery, need for further intervention), as well as the risks and benefits of all alternative treatments were explained and understood  The patient elected to proceed with injection  The procedure was done with aseptic technique, and the patient tolerated the procedure well with no complications  A corticosteroid injection was performed  The patient should take 1-2 days off of activity, with gradual return to activity as tolerated  Ice to the knee 1-2 times daily for 20 minutes, for next 24-48 hrs  Surgery:     No surgery is recommended at this point, continue with conservative treatment plan as noted  Follow up:    Return in about 8 weeks (around 9/20/2021) for Recheck  Chief Complaint   Patient presents with    Right Knee - Follow-up       History of Present Illness: The patient is returns for follow up of right knee s/p medial meniscectomy  Over the past 2 months he states his knee has began to bother him more  He notes this pain is sharp and stabbing on the medial aspect of the knee  He went to run after a dog last weekend and was only able to take 2 steps with a significant amount of pain  He has been having difficulty walking and notes some swelling as well /    Pain is located medial      Pain is improved by rest  Pain is aggravated by weight bearing and walking  Symptoms include clicking, catching and swelling  The patient has tried rest, ice, NSAIDS and physical therapy  Knee Surgical History:  Right knee medial menisectomy May 2020 - Dr Peyton Stein    Past Medical, Social and Family History:  Past Medical History:   Diagnosis Date    Accelerated essential hypertension     Acquired spondylolisthesis     Atypical chest pain     CA of prostate ([address] )     CPAP (continuous positive airway pressure) dependence     De Quervain's tenosynovitis     Epileptic seizure ([address]     Esophageal reflux     Fatigue     Hematuria     Hyperglycemia     last assessed: 3/28/2013    Hyperlipidemia     Impaired fasting glucose     Lightheadedness     Lower back pain     Lumbar foraminal stenosis     Lumbar spondylosis     Malignant melanoma of skin (HCC)     Microscopic hematuria     last assessed: 6/16/2014    Obesity     Plantar fasciitis     Polyp of sigmoid colon     PONV (postoperative nausea and vomiting)     Renal cell carcinoma, right (HCC)     Right kidney mass     Sleep apnea     Spinal stenosis     Status post medial meniscectomy of right knee 7/21/2021     Past Surgical History:   Procedure Laterality Date    CHOLECYSTECTOMY      HERNIA REPAIR      NEPHRECTOMY Left     AR KNEE SCOPE,MED/LAT MENISECTOMY Right 5/27/2020    Procedure: MEDIAL Menisectomy Right knee;  Surgeon: Tatiana Sorto DO;  Location:  MAIN OR;  Service: Orthopedics    PROSTATECTOMY       Allergies   Allergen Reactions    Ciprofloxacin Other (See Comments)     Other reaction(s): RAPID IRREGULAR HEART BEAT    Fluticasone-Salmeterol GI Intolerance     Current Outpatient Medications on File Prior to Visit   Medication Sig Dispense Refill    clotrimazole 1 % external solution Apply 1 application topically 2 (two) times a day INTO EACH EAR 30 mL 0    Diclofenac Sodium (VOLTAREN) 1 % Apply 4 g topically 4 (four) times a day Apply 4g to right knee up to 4 times daily for pain relief   100 g 3    losartan (COZAAR) 50 mg tablet TAKE 1 TABLET BY MOUTH EVERY DAY 30 tablet 2    phenytoin (DILANTIN) 100 mg ER capsule TAKE 1 CAPSULE BY MOUTH THREE TIMES A DAY 90 capsule 3     No current facility-administered medications on file prior to visit  Social History     Socioeconomic History    Marital status: /Civil Union     Spouse name: Not on file    Number of children: Not on file    Years of education: Not on file    Highest education level: Not on file   Occupational History    Not on file   Tobacco Use    Smoking status: Never Smoker    Smokeless tobacco: Never Used    Tobacco comment: nonsmoker   Vaping Use    Vaping Use: Never used   Substance and Sexual Activity    Alcohol use: No    Drug use: No    Sexual activity: Not on file   Other Topics Concern    Not on file   Social History Narrative    Not on file     Social Determinants of Health     Financial Resource Strain:     Difficulty of Paying Living Expenses:    Food Insecurity:     Worried About Running Out of Food in the Last Year:     [address] in the Last Year:    Transportation Needs:     Lack of Transportation (Medical):  Lack of Transportation (Non-Medical):    Physical Activity:     Days of Exercise per Week:     Minutes of Exercise per Session:    Stress:     Feeling of Stress :    Social Connections:     Frequency of Communication with Friends and Family:     Frequency of Social Gatherings with Friends and Family:     Attends Worship Services:     Active Member of Clubs or Organizations:     Attends Club or Organization Meetings:     Marital Status:    Intimate Partner Violence:     Fear of Current or Ex-Partner:     Emotionally Abused:     Physically Abused:     Sexually Abused:      I have reviewed the past medical, surgical, social and family history, medications and allergies as documented in the EMR  Review of systems: ROS is negative other than that noted in the HPI  Constitutional: Negative for fatigue and fever        Physical Exam:    Blood pressure 137/75, pulse 71, height 5' 7" (1 702 m), weight 111 kg (244 lb)  General/Constitutional: NAD, well developed, well nourished  HENT: Normocephalic, atraumatic  CV: Intact distal pulses, regular rate  Resp: No respiratory distress or labored breathing  Lymphatic: No lymphadenopathy palpated  Neuro: Alert and Oriented x 3, no focal deficits  Psych: Normal mood, normal affect, normal judgement, normal behavior  Skin: Warm, dry, no rashes, no erythema    Knee Exam (focused): RIGHT LEFT   ROM:   0-130 0-130   Palpation: Effusion negative negative     MJL tenderness Positive Negative     LJL tenderness Negative Negative   Meniscus: Freida Negative Negative    Apley's Compression Negative Negative   Instability: Varus stable stable     Valgus stable stable   Special Tests: Lachman Negative Negative     Posterior drawer Negative Negative     Anterior drawer Negative Negative     Pivot shift not tested not tested     Dial not tested not tested   Patella: Palpation no tenderness no tenderness     Mobility 1/4 1/4     Apprehension Negative Negative   Other: Single leg 1/4 squat not tested not tested      Large joint arthrocentesis: R knee  Universal Protocol:  Consent: Verbal consent obtained  Risks and benefits: risks, benefits and alternatives were discussed  Consent given by: patient and parent  Time out: Immediately prior to procedure a "time out" was called to verify the correct patient, procedure, equipment, support staff and site/side marked as required  Patient understanding: patient states understanding of the procedure being performed  Patient consent: the patient's understanding of the procedure matches consent given  Procedure consent: procedure consent matches procedure scheduled  Relevant documents: relevant documents present and verified  Test results: test results available and properly labeled  Site marked: the operative site was marked  Radiology Images displayed and confirmed   If images not available, report reviewed: imaging studies available    Supporting Documentation  Indications: pain and joint swelling   Procedure Details  Location: knee - R knee  Needle size: 22 G  Ultrasound guidance: no  Approach: anterolateral  Medications administered: 3 mL lidocaine 1 %; 6 mg betamethasone acetate-betamethasone sodium phosphate 6 (3-3) mg/mL    Patient tolerance: patient tolerated the procedure well with no immediate complications  Dressing:  Sterile dressing applied        LE NV Exam: +2 DP/PT pulses bilaterally  Sensation intact to light touch L2-S1 bilaterally    No calf tenderness to palpation bilaterally      Knee Imaging    No imaging was performed today      Scribe Attestation    I,:  Amanda Arthur am acting as a scribe while in the presence of the attending physician :       I,:  Miriam Bonilla DO personally performed the services described in this documentation    as scribed in my presence :

## 2021-09-10 LAB
CHOLEST SERPL-MCNC: 267 MG/DL (ref 100–199)
CHOLEST/HDLC SERPL: 6.1 RATIO (ref 0–5)
HDLC SERPL-MCNC: 44 MG/DL
LDLC SERPL CALC-MCNC: 175 MG/DL (ref 0–99)
LDLC SERPL DIRECT ASSAY-MCNC: 183 MG/DL (ref 0–99)
SL AMB VLDL CHOLESTEROL CALC: 48 MG/DL (ref 5–40)
TRIGL SERPL-MCNC: 251 MG/DL (ref 0–149)

## 2021-09-14 ENCOUNTER — TELEPHONE (OUTPATIENT)
Dept: FAMILY MEDICINE CLINIC | Facility: CLINIC | Age: 61
End: 2021-09-14

## 2021-09-14 NOTE — TELEPHONE ENCOUNTER
----- Message from [address] sent at 9/14/2021  2:54 PM EDT -----  Mr  [facility] lipid panel has worsened since last year  It would be adventageous for him to start statin therapy  If he is agreeable I can send medication into the pharmacy for him at this time  If he wishes to discuss with Dr Nitin Duggan at his appointment in November this is also reasonable  In the meantime Mr Schneider should eat lo cholesterol foods, plenty of fruits/veggies and limit saturated fats  Physical activity is also advised

## 2021-09-24 ENCOUNTER — OFFICE VISIT (OUTPATIENT)
Dept: OBGYN CLINIC | Facility: CLINIC | Age: 61
End: 2021-09-24
Payer: COMMERCIAL

## 2021-09-24 VITALS
SYSTOLIC BLOOD PRESSURE: 138 MMHG | HEIGHT: 67 IN | DIASTOLIC BLOOD PRESSURE: 72 MMHG | WEIGHT: 137 LBS | BODY MASS INDEX: 21.5 KG/M2

## 2021-09-24 DIAGNOSIS — S83.231A COMPLEX TEAR OF MEDIAL MENISCUS OF RIGHT KNEE, UNSPECIFIED WHETHER OLD OR CURRENT TEAR, INITIAL ENCOUNTER: ICD-10-CM

## 2021-09-24 DIAGNOSIS — M17.11 PRIMARY OSTEOARTHRITIS OF RIGHT KNEE: Primary | ICD-10-CM

## 2021-09-24 PROCEDURE — 3008F BODY MASS INDEX DOCD: CPT | Performed by: ORTHOPAEDIC SURGERY

## 2021-09-24 PROCEDURE — 99213 OFFICE O/P EST LOW 20 MIN: CPT | Performed by: ORTHOPAEDIC SURGERY

## 2021-09-24 PROCEDURE — 1036F TOBACCO NON-USER: CPT | Performed by: ORTHOPAEDIC SURGERY

## 2021-09-24 NOTE — PROGRESS NOTES
Ortho Sports Medicine Knee Follow Up Visit     Assesment:     59 y o  male right knee medial joint mild osteoarthritis    Plan:      Conservative treatment:    Ice to knee for 20 minutes at least 1-2 times daily  PT for ROM/strengthening to knee, hip and core  OTC NSAIDS prn for pain  I discussed with Malvin Stephens that he will benefit from attending physical therapy for his arthritis, even if he goes once or twice to get a home exercise plan  We also discussed a visco injection for future consideration, which I explained provides about 6 months of pain relief  An MRI may be ordered in the future to rule out additional pathology in the future, if necessary  Malvin Stephens will follow up in one month for his next CSI injection, 3 months apart from his last one in late July  Imaging:    No imaging was available for review today  Injection:    No Injection planned at this time  May consider future corticosteroid injection depending on clinical exam/imaging  May consider future viscosupplementation injection depending on clinical exam/imaging  Surgery:     No surgery is recommended at this point, continue with conservative treatment plan as noted  Follow up:    No follow-ups on file  No chief complaint on file  History of Present Illness: The patient is returns for follow up of knee s/p medial meniscectomy  Since the prior visit, He reports significant improvement, especially with the CSI injection received on 7/26/2021  He took a hiking trip to [place] shortly after his injection and had no problems climbing up, but felt pain on the way down  The relief wore off within the last 2 weeks and his symptoms have returned, although he states that the pain is not as significant as what he was experiencing before his first injection  Pain is located medial      Pain is improved by rest and injection, as well as NSAIDs that he does not take very often   Pain is aggravated by weight bearing and walking  Symptoms include tightness in his posterior knee with motion, as well as "throbbing" pain in his medial knee at nights only  The patient has tried rest, ice, NSAIDS, physical therapy and injection  He has not attended physical therapy recently           Knee Surgical History:  Right knee medial menisectomy May 2020 - Dr Antwan Ackerman    Past Medical, Social and Family History:  Past Medical History:   Diagnosis Date    Accelerated essential hypertension     Acquired spondylolisthesis     Atypical chest pain     CA of prostate ([address]     CPAP (continuous positive airway pressure) dependence     De Quervain's tenosynovitis     Epileptic seizure ([address]     Esophageal reflux     Fatigue     Hematuria     Hyperglycemia     last assessed: 3/28/2013    Hyperlipidemia     Impaired fasting glucose     Lightheadedness     Lower back pain     Lumbar foraminal stenosis     Lumbar spondylosis     Malignant melanoma of skin (HCC)     Microscopic hematuria     last assessed: 6/16/2014    Obesity     Plantar fasciitis     Polyp of sigmoid colon     PONV (postoperative nausea and vomiting)     Renal cell carcinoma, right (HCC)     Right kidney mass     Sleep apnea     Spinal stenosis     Status post medial meniscectomy of right knee 7/21/2021     Past Surgical History:   Procedure Laterality Date    CHOLECYSTECTOMY      HERNIA REPAIR      NEPHRECTOMY Left     ME KNEE SCOPE,MED/LAT MENISECTOMY Right 5/27/2020    Procedure: MEDIAL Menisectomy Right knee;  Surgeon: Axel Avendaño DO;  Location:  MAIN OR;  Service: Orthopedics    PROSTATECTOMY       Allergies   Allergen Reactions    Ciprofloxacin Other (See Comments)     Other reaction(s): RAPID IRREGULAR HEART BEAT    Fluticasone-Salmeterol GI Intolerance     Current Outpatient Medications on File Prior to Visit   Medication Sig Dispense Refill    clotrimazole 1 % external solution Apply 1 application topically 2 (two) times a day INTO [facility] EAR 30 mL 0    Diclofenac Sodium (VOLTAREN) 1 % Apply 4 g topically 4 (four) times a day Apply 4g to right knee up to 4 times daily for pain relief  100 g 3    losartan (COZAAR) 50 mg tablet TAKE 1 TABLET BY MOUTH EVERY DAY 30 tablet 2    phenytoin (DILANTIN) 100 mg ER capsule TAKE 1 CAPSULE BY MOUTH THREE TIMES A DAY 90 capsule 3     No current facility-administered medications on file prior to visit  Social History     Socioeconomic History    Marital status: /Civil Union     Spouse name: Not on file    Number of children: Not on file    Years of education: Not on file    Highest education level: Not on file   Occupational History    Not on file   Tobacco Use    Smoking status: Never Smoker    Smokeless tobacco: Never Used    Tobacco comment: nonsmoker   Vaping Use    Vaping Use: Never used   Substance and Sexual Activity    Alcohol use: No    Drug use: No    Sexual activity: Not on file   Other Topics Concern    Not on file   Social History Narrative    Not on file     Social Determinants of Health     Financial Resource Strain:     Difficulty of Paying Living Expenses:    Food Insecurity:     Worried About Running Out of Food in the Last Year:     [address] in the Last Year:    Transportation Needs:     Lack of Transportation (Medical):      Lack of Transportation (Non-Medical):    Physical Activity:     Days of Exercise per Week:     Minutes of Exercise per Session:    Stress:     Feeling of Stress :    Social Connections:     Frequency of Communication with Friends and Family:     Frequency of Social Gatherings with Friends and Family:     Attends [employer] Services:     Active Member of Clubs or Organizations:     Attends Club or Organization Meetings:     Marital Status:    Intimate Partner Violence:     Fear of Current or Ex-Partner:     Emotionally Abused:     Physically Abused:     Sexually Abused:          I have reviewed the past medical, surgical, social and family history, medications and allergies as documented in the EMR  Review of systems: ROS is negative other than that noted in the HPI  Constitutional: Negative for fatigue and fever  Physical Exam:    Blood pressure 138/72, height 5' 7" (1 702 m), weight 62 1 kg (137 lb)  General/Constitutional: NAD, well developed, well nourished  HENT: Normocephalic, atraumatic  CV: Intact distal pulses, regular rate  Resp: No respiratory distress or labored breathing  Lymphatic: No lymphadenopathy palpated  Neuro: Alert and Oriented x 3, no focal deficits  Psych: Normal mood, normal affect, normal judgement, normal behavior  Skin: Warm, dry, no rashes, no erythema      Knee Exam (focused): RIGHT LEFT   ROM:   0-130 0-130   Palpation: Effusion negative negative     MJL tenderness Positive Negative     LJL tenderness Negative Negative   Meniscus:  Freida Negative Negative    Apley's Compression Negative Negative   Instability: Varus stable stable     Valgus stable stable   Special Tests: Lachman Negative Negative     Posterior drawer Negative Negative     Anterior drawer Negative Negative     Pivot shift not tested not tested     Dial not tested not tested   Patella: Palpation no tenderness no tenderness     Mobility 1/4 1/4     Apprehension Negative Negative   Other: Single leg 1/4 squat not tested not tested           LE NV Exam: +2 DP/PT pulses bilaterally  Sensation intact to light touch L2-S1 bilaterally    No calf tenderness to palpation bilaterally      Knee Imaging     No imaging was performed today      Scribe Attestation    I,:  Opal Almendarez am acting as a scribe while in the presence of the attending physician :       I,:  Rosey Bonilla DO personally performed the services described in this documentation    as scribed in my presence :

## 2021-10-05 DIAGNOSIS — I10 BENIGN ESSENTIAL HYPERTENSION: ICD-10-CM

## 2021-10-05 RX ORDER — LOSARTAN POTASSIUM 50 MG/1
TABLET ORAL
Qty: 30 TABLET | Refills: 2 | Status: SHIPPED | OUTPATIENT
Start: 2021-10-05 | End: 2021-11-24 | Stop reason: ALTCHOICE

## 2021-10-18 ENCOUNTER — EVALUATION (OUTPATIENT)
Dept: PHYSICAL THERAPY | Facility: CLINIC | Age: 61
End: 2021-10-18
Payer: COMMERCIAL

## 2021-10-18 DIAGNOSIS — S83.231A COMPLEX TEAR OF MEDIAL MENISCUS OF RIGHT KNEE, UNSPECIFIED WHETHER OLD OR CURRENT TEAR, INITIAL ENCOUNTER: ICD-10-CM

## 2021-10-18 DIAGNOSIS — M17.11 PRIMARY OSTEOARTHRITIS OF RIGHT KNEE: Primary | ICD-10-CM

## 2021-10-18 PROCEDURE — 97110 THERAPEUTIC EXERCISES: CPT | Performed by: PHYSICAL THERAPIST

## 2021-10-18 PROCEDURE — 97162 PT EVAL MOD COMPLEX 30 MIN: CPT | Performed by: PHYSICAL THERAPIST

## 2021-10-25 ENCOUNTER — OFFICE VISIT (OUTPATIENT)
Dept: PHYSICAL THERAPY | Facility: CLINIC | Age: 61
End: 2021-10-25
Payer: COMMERCIAL

## 2021-10-25 DIAGNOSIS — M17.11 PRIMARY OSTEOARTHRITIS OF RIGHT KNEE: Primary | ICD-10-CM

## 2021-10-25 DIAGNOSIS — S83.231A COMPLEX TEAR OF MEDIAL MENISCUS OF RIGHT KNEE, UNSPECIFIED WHETHER OLD OR CURRENT TEAR, INITIAL ENCOUNTER: ICD-10-CM

## 2021-10-25 PROCEDURE — 97530 THERAPEUTIC ACTIVITIES: CPT

## 2021-10-25 PROCEDURE — 97140 MANUAL THERAPY 1/> REGIONS: CPT

## 2021-10-25 PROCEDURE — 97110 THERAPEUTIC EXERCISES: CPT

## 2021-10-29 ENCOUNTER — OFFICE VISIT (OUTPATIENT)
Dept: OBGYN CLINIC | Facility: CLINIC | Age: 61
End: 2021-10-29
Payer: COMMERCIAL

## 2021-10-29 VITALS
DIASTOLIC BLOOD PRESSURE: 90 MMHG | BODY MASS INDEX: 37.04 KG/M2 | SYSTOLIC BLOOD PRESSURE: 140 MMHG | WEIGHT: 236 LBS | HEIGHT: 67 IN

## 2021-10-29 DIAGNOSIS — M17.11 PRIMARY OSTEOARTHRITIS OF RIGHT KNEE: Primary | ICD-10-CM

## 2021-10-29 PROCEDURE — 99213 OFFICE O/P EST LOW 20 MIN: CPT | Performed by: ORTHOPAEDIC SURGERY

## 2021-10-29 PROCEDURE — 3008F BODY MASS INDEX DOCD: CPT | Performed by: ORTHOPAEDIC SURGERY

## 2021-10-29 PROCEDURE — 20610 DRAIN/INJ JOINT/BURSA W/O US: CPT | Performed by: ORTHOPAEDIC SURGERY

## 2021-10-29 RX ORDER — METHYLPREDNISOLONE ACETATE 40 MG/ML
1 INJECTION, SUSPENSION INTRA-ARTICULAR; INTRALESIONAL; INTRAMUSCULAR; SOFT TISSUE
Status: COMPLETED | OUTPATIENT
Start: 2021-10-29 | End: 2021-10-29

## 2021-10-29 RX ORDER — LIDOCAINE HYDROCHLORIDE 10 MG/ML
3 INJECTION, SOLUTION INFILTRATION; PERINEURAL
Status: COMPLETED | OUTPATIENT
Start: 2021-10-29 | End: 2021-10-29

## 2021-10-29 RX ADMIN — METHYLPREDNISOLONE ACETATE 1 ML: 40 INJECTION, SUSPENSION INTRA-ARTICULAR; INTRALESIONAL; INTRAMUSCULAR; SOFT TISSUE at 11:34

## 2021-10-29 RX ADMIN — LIDOCAINE HYDROCHLORIDE 3 ML: 10 INJECTION, SOLUTION INFILTRATION; PERINEURAL at 11:34

## 2021-11-01 ENCOUNTER — OFFICE VISIT (OUTPATIENT)
Dept: PHYSICAL THERAPY | Facility: CLINIC | Age: 61
End: 2021-11-01
Payer: COMMERCIAL

## 2021-11-01 DIAGNOSIS — S83.231A COMPLEX TEAR OF MEDIAL MENISCUS OF RIGHT KNEE, UNSPECIFIED WHETHER OLD OR CURRENT TEAR, INITIAL ENCOUNTER: ICD-10-CM

## 2021-11-01 DIAGNOSIS — M17.11 PRIMARY OSTEOARTHRITIS OF RIGHT KNEE: Primary | ICD-10-CM

## 2021-11-01 PROCEDURE — 97530 THERAPEUTIC ACTIVITIES: CPT | Performed by: PHYSICAL THERAPIST

## 2021-11-01 PROCEDURE — 97110 THERAPEUTIC EXERCISES: CPT | Performed by: PHYSICAL THERAPIST

## 2021-11-01 PROCEDURE — 97140 MANUAL THERAPY 1/> REGIONS: CPT | Performed by: PHYSICAL THERAPIST

## 2021-11-08 ENCOUNTER — OFFICE VISIT (OUTPATIENT)
Dept: PHYSICAL THERAPY | Facility: CLINIC | Age: 61
End: 2021-11-08
Payer: COMMERCIAL

## 2021-11-08 DIAGNOSIS — M17.11 PRIMARY OSTEOARTHRITIS OF RIGHT KNEE: Primary | ICD-10-CM

## 2021-11-08 DIAGNOSIS — S83.231A COMPLEX TEAR OF MEDIAL MENISCUS OF RIGHT KNEE, UNSPECIFIED WHETHER OLD OR CURRENT TEAR, INITIAL ENCOUNTER: ICD-10-CM

## 2021-11-08 PROCEDURE — 97530 THERAPEUTIC ACTIVITIES: CPT

## 2021-11-08 PROCEDURE — 97110 THERAPEUTIC EXERCISES: CPT

## 2021-11-08 PROCEDURE — 97140 MANUAL THERAPY 1/> REGIONS: CPT

## 2021-11-09 DIAGNOSIS — G40.909 SEIZURE DISORDER (HCC): ICD-10-CM

## 2021-11-10 RX ORDER — PHENYTOIN SODIUM 100 MG/1
CAPSULE, EXTENDED RELEASE ORAL
Qty: 90 CAPSULE | Refills: 3 | Status: SHIPPED | OUTPATIENT
Start: 2021-11-10 | End: 2022-03-14

## 2021-11-15 ENCOUNTER — OFFICE VISIT (OUTPATIENT)
Dept: PHYSICAL THERAPY | Facility: CLINIC | Age: 61
End: 2021-11-15
Payer: COMMERCIAL

## 2021-11-15 DIAGNOSIS — M17.11 PRIMARY OSTEOARTHRITIS OF RIGHT KNEE: Primary | ICD-10-CM

## 2021-11-15 DIAGNOSIS — S83.231A COMPLEX TEAR OF MEDIAL MENISCUS OF RIGHT KNEE, UNSPECIFIED WHETHER OLD OR CURRENT TEAR, INITIAL ENCOUNTER: ICD-10-CM

## 2021-11-15 PROCEDURE — 97110 THERAPEUTIC EXERCISES: CPT | Performed by: PHYSICAL THERAPIST

## 2021-11-15 PROCEDURE — 97140 MANUAL THERAPY 1/> REGIONS: CPT | Performed by: PHYSICAL THERAPIST

## 2021-11-15 PROCEDURE — 97530 THERAPEUTIC ACTIVITIES: CPT | Performed by: PHYSICAL THERAPIST

## 2021-11-22 ENCOUNTER — OFFICE VISIT (OUTPATIENT)
Dept: PHYSICAL THERAPY | Facility: CLINIC | Age: 61
End: 2021-11-22
Payer: COMMERCIAL

## 2021-11-22 DIAGNOSIS — M17.11 PRIMARY OSTEOARTHRITIS OF RIGHT KNEE: ICD-10-CM

## 2021-11-22 DIAGNOSIS — S83.231A COMPLEX TEAR OF MEDIAL MENISCUS OF RIGHT KNEE, UNSPECIFIED WHETHER OLD OR CURRENT TEAR, INITIAL ENCOUNTER: Primary | ICD-10-CM

## 2021-11-22 PROCEDURE — 97110 THERAPEUTIC EXERCISES: CPT | Performed by: PHYSICAL THERAPIST

## 2021-11-22 PROCEDURE — 97140 MANUAL THERAPY 1/> REGIONS: CPT | Performed by: PHYSICAL THERAPIST

## 2021-11-22 PROCEDURE — 97530 THERAPEUTIC ACTIVITIES: CPT | Performed by: PHYSICAL THERAPIST

## 2021-11-24 ENCOUNTER — OFFICE VISIT (OUTPATIENT)
Dept: FAMILY MEDICINE CLINIC | Facility: CLINIC | Age: 61
End: 2021-11-24
Payer: COMMERCIAL

## 2021-11-24 VITALS
HEIGHT: 67 IN | BODY MASS INDEX: 37.04 KG/M2 | DIASTOLIC BLOOD PRESSURE: 64 MMHG | TEMPERATURE: 97.2 F | SYSTOLIC BLOOD PRESSURE: 152 MMHG | HEART RATE: 54 BPM | OXYGEN SATURATION: 98 % | WEIGHT: 236 LBS

## 2021-11-24 DIAGNOSIS — G40.909 NONINTRACTABLE EPILEPSY WITHOUT STATUS EPILEPTICUS, UNSPECIFIED EPILEPSY TYPE (HCC): ICD-10-CM

## 2021-11-24 DIAGNOSIS — Z12.11 COLON CANCER SCREENING: ICD-10-CM

## 2021-11-24 DIAGNOSIS — I10 BENIGN ESSENTIAL HYPERTENSION: Primary | ICD-10-CM

## 2021-11-24 DIAGNOSIS — G47.33 SEVERE OBSTRUCTIVE SLEEP APNEA: ICD-10-CM

## 2021-11-24 DIAGNOSIS — N18.31 STAGE 3A CHRONIC KIDNEY DISEASE (HCC): ICD-10-CM

## 2021-11-24 DIAGNOSIS — C64.1 RENAL CELL CARCINOMA, RIGHT (HCC): ICD-10-CM

## 2021-11-24 DIAGNOSIS — Z85.46 HISTORY OF PROSTATE CANCER: ICD-10-CM

## 2021-11-24 PROCEDURE — 99214 OFFICE O/P EST MOD 30 MIN: CPT | Performed by: FAMILY MEDICINE

## 2021-11-24 PROCEDURE — 1036F TOBACCO NON-USER: CPT | Performed by: FAMILY MEDICINE

## 2021-11-24 RX ORDER — AMLODIPINE BESYLATE 5 MG/1
5 TABLET ORAL DAILY
Qty: 90 TABLET | Refills: 3 | Status: SHIPPED | OUTPATIENT
Start: 2021-11-24 | End: 2022-03-23

## 2021-11-29 ENCOUNTER — OFFICE VISIT (OUTPATIENT)
Dept: PHYSICAL THERAPY | Facility: CLINIC | Age: 61
End: 2021-11-29
Payer: COMMERCIAL

## 2021-11-29 DIAGNOSIS — S83.231A COMPLEX TEAR OF MEDIAL MENISCUS OF RIGHT KNEE, UNSPECIFIED WHETHER OLD OR CURRENT TEAR, INITIAL ENCOUNTER: Primary | ICD-10-CM

## 2021-11-29 DIAGNOSIS — M17.11 PRIMARY OSTEOARTHRITIS OF RIGHT KNEE: ICD-10-CM

## 2021-11-29 PROCEDURE — 97140 MANUAL THERAPY 1/> REGIONS: CPT | Performed by: PHYSICAL THERAPIST

## 2021-11-29 PROCEDURE — 97530 THERAPEUTIC ACTIVITIES: CPT | Performed by: PHYSICAL THERAPIST

## 2021-11-29 PROCEDURE — 97110 THERAPEUTIC EXERCISES: CPT | Performed by: PHYSICAL THERAPIST

## 2021-12-03 ENCOUNTER — HOSPITAL ENCOUNTER (OUTPATIENT)
Dept: CT IMAGING | Facility: HOSPITAL | Age: 61
Discharge: HOME | End: 2021-12-03
Payer: COMMERCIAL

## 2021-12-03 ENCOUNTER — HOSPITAL ENCOUNTER (OUTPATIENT)
Dept: RADIOLOGY | Facility: HOSPITAL | Age: 61
Discharge: HOME | End: 2021-12-03
Payer: COMMERCIAL

## 2021-12-03 DIAGNOSIS — C64.1 RENAL CELL CARCINOMA, RIGHT (HCC): ICD-10-CM

## 2021-12-03 PROCEDURE — 74176 CT ABD & PELVIS W/O CONTRAST: CPT

## 2021-12-03 PROCEDURE — 71046 X-RAY EXAM CHEST 2 VIEWS: CPT

## 2021-12-06 ENCOUNTER — OFFICE VISIT (OUTPATIENT)
Dept: PHYSICAL THERAPY | Facility: CLINIC | Age: 61
End: 2021-12-06
Payer: COMMERCIAL

## 2021-12-06 DIAGNOSIS — M17.11 PRIMARY OSTEOARTHRITIS OF RIGHT KNEE: Primary | ICD-10-CM

## 2021-12-06 DIAGNOSIS — S83.231A COMPLEX TEAR OF MEDIAL MENISCUS OF RIGHT KNEE, UNSPECIFIED WHETHER OLD OR CURRENT TEAR, INITIAL ENCOUNTER: ICD-10-CM

## 2021-12-06 LAB
PSA FREE MFR SERPL: 5 %
PSA FREE SERPL-MCNC: 0.02 NG/ML
PSA SERPL-MCNC: 0.4 NG/ML (ref 0–4)

## 2021-12-06 PROCEDURE — 97140 MANUAL THERAPY 1/> REGIONS: CPT | Performed by: PHYSICAL THERAPIST

## 2021-12-06 PROCEDURE — 97110 THERAPEUTIC EXERCISES: CPT | Performed by: PHYSICAL THERAPIST

## 2021-12-07 ENCOUNTER — TELEPHONE (OUTPATIENT)
Dept: FAMILY MEDICINE CLINIC | Facility: CLINIC | Age: 61
End: 2021-12-07

## 2021-12-08 ENCOUNTER — TELEPHONE (OUTPATIENT)
Dept: FAMILY MEDICINE CLINIC | Facility: CLINIC | Age: 61
End: 2021-12-08

## 2021-12-13 ENCOUNTER — APPOINTMENT (OUTPATIENT)
Dept: PHYSICAL THERAPY | Facility: CLINIC | Age: 61
End: 2021-12-13
Payer: COMMERCIAL

## 2021-12-15 ENCOUNTER — OFFICE VISIT (OUTPATIENT)
Dept: PHYSICAL THERAPY | Facility: CLINIC | Age: 61
End: 2021-12-15
Payer: COMMERCIAL

## 2021-12-15 DIAGNOSIS — S83.231A COMPLEX TEAR OF MEDIAL MENISCUS OF RIGHT KNEE, UNSPECIFIED WHETHER OLD OR CURRENT TEAR, INITIAL ENCOUNTER: Primary | ICD-10-CM

## 2021-12-15 DIAGNOSIS — M17.11 PRIMARY OSTEOARTHRITIS OF RIGHT KNEE: ICD-10-CM

## 2021-12-15 PROCEDURE — 97530 THERAPEUTIC ACTIVITIES: CPT | Performed by: PHYSICAL THERAPIST

## 2021-12-15 PROCEDURE — 97110 THERAPEUTIC EXERCISES: CPT | Performed by: PHYSICAL THERAPIST

## 2021-12-15 PROCEDURE — 97140 MANUAL THERAPY 1/> REGIONS: CPT | Performed by: PHYSICAL THERAPIST

## 2021-12-16 ENCOUNTER — TELEPHONE (OUTPATIENT)
Dept: GASTROENTEROLOGY | Facility: CLINIC | Age: 61
End: 2021-12-16

## 2021-12-17 ENCOUNTER — PREP FOR PROCEDURE (OUTPATIENT)
Dept: GASTROENTEROLOGY | Facility: CLINIC | Age: 61
End: 2021-12-17

## 2021-12-17 DIAGNOSIS — Z85.038 PERSONAL HISTORY OF COLON CANCER: Primary | ICD-10-CM

## 2021-12-20 ENCOUNTER — OFFICE VISIT (OUTPATIENT)
Dept: PHYSICAL THERAPY | Facility: CLINIC | Age: 61
End: 2021-12-20
Payer: COMMERCIAL

## 2021-12-20 DIAGNOSIS — S83.231A COMPLEX TEAR OF MEDIAL MENISCUS OF RIGHT KNEE, UNSPECIFIED WHETHER OLD OR CURRENT TEAR, INITIAL ENCOUNTER: Primary | ICD-10-CM

## 2021-12-20 DIAGNOSIS — M17.11 PRIMARY OSTEOARTHRITIS OF RIGHT KNEE: ICD-10-CM

## 2021-12-20 PROCEDURE — 97112 NEUROMUSCULAR REEDUCATION: CPT

## 2021-12-20 PROCEDURE — 97140 MANUAL THERAPY 1/> REGIONS: CPT

## 2021-12-20 PROCEDURE — 97110 THERAPEUTIC EXERCISES: CPT

## 2021-12-27 ENCOUNTER — APPOINTMENT (OUTPATIENT)
Dept: PHYSICAL THERAPY | Facility: CLINIC | Age: 61
End: 2021-12-27
Payer: COMMERCIAL

## 2022-01-03 ENCOUNTER — OFFICE VISIT (OUTPATIENT)
Dept: PHYSICAL THERAPY | Facility: CLINIC | Age: 62
End: 2022-01-03
Payer: COMMERCIAL

## 2022-01-03 DIAGNOSIS — S83.231A COMPLEX TEAR OF MEDIAL MENISCUS OF RIGHT KNEE, UNSPECIFIED WHETHER OLD OR CURRENT TEAR, INITIAL ENCOUNTER: Primary | ICD-10-CM

## 2022-01-03 DIAGNOSIS — M17.11 PRIMARY OSTEOARTHRITIS OF RIGHT KNEE: ICD-10-CM

## 2022-01-03 PROCEDURE — 97530 THERAPEUTIC ACTIVITIES: CPT | Performed by: PHYSICAL THERAPIST

## 2022-01-03 PROCEDURE — 97140 MANUAL THERAPY 1/> REGIONS: CPT | Performed by: PHYSICAL THERAPIST

## 2022-01-03 PROCEDURE — 97110 THERAPEUTIC EXERCISES: CPT | Performed by: PHYSICAL THERAPIST

## 2022-01-03 NOTE — PROGRESS NOTES
Daily Note     Today's date: 1/3/2022  Patient name: Castillo Boyd  : 1960  MRN: [id number]  Referring provider: Lexie Jones DO  Dx:   Encounter Diagnosis     ICD-10-CM    1  Complex tear of medial meniscus of right knee, unspecified whether old or current tear, initial encounter  S83 231A    2  Primary osteoarthritis of right knee  M17 11           Subjective: Pt noted that "I have not had any pain in my knee  The only thing that hurt a little was going down a ladder "     Objective: See treatment diary below    Assessment: Tolerated treatment well  Patient demonstrated fatigue post treatment and exhibited good technique with therapeutic exercises  Plan: Continue per plan of care        Precautions:     Manuals 1/3     11/22 11/29 12/6 12/15 12/20   STM ITB, lateral distal hamstring JZ     JZ JMAURICE JMAURICE JMAURICE KSG   PROM stretch JZ     ALONSO GUPTA JMAURICE KSG                             Neuro Re-Ed 1/3     11/22 11/29 12/6 12/15    Tandem walk      15' 3x                                                                                     Ther Ex 1/3     11/22 11/29 12/6 12/15    Seated 3 way hamstring stretch B/L :30x3     :30x3  :30x3 ea :30x3 ea :30x3   Stand knee flexion stretch LP B/L :30x3            Heel raise u/l         3x10 ea 3x10   Prone knee flexion stretch strap         :30x3 :30x3                Gastroc stretch  :30x3     :30x3 :30x3 :30x3 ea :30x3 :30x3   Soleus stretch :30x3      :30x3 :30x3 ea :30x3 :30x3   Calf press b/l        150/ 3x10  170/ 3x10 170/  3x10                Ther Activity 1/3     11/22 11/29 12/6 12/15    STS             STS 2hha 2 foam 3x20     3x20 no hha 1 foam 3x20 2 foam      Step ups 2hha 8" 3x10 ea            Lat step down 2hha 4" 2x10 ea            6 min walk              RB 6'      5' 5'   5' 5"   Leg press b/l seat 6 150/ 3x10     150/ 3x10 130/ 3x10 150/ 3x10 150/ 3x10 150  3x10   LP b/l seat3          70/ 2x10 70  2x10   LP u/l in PF seat 7 90/ 3x10     70/      70/ 3x10 70  3x10                             Modalities 1/3     11/22 11/29 12/6 12/15                 CT

## 2022-01-05 DIAGNOSIS — Z85.038 HISTORY OF COLON CANCER: Primary | ICD-10-CM

## 2022-01-05 RX ORDER — SODIUM PICOSULFATE, MAGNESIUM OXIDE, AND ANHYDROUS CITRIC ACID 10; 3.5; 12 MG/160ML; G/160ML; G/160ML
LIQUID ORAL
Qty: 320 ML | Refills: 0 | Status: SHIPPED | OUTPATIENT
Start: 2022-01-05 | End: 2022-03-18 | Stop reason: HOSPADM

## 2022-01-05 NOTE — TELEPHONE ENCOUNTER
Scheduled date of colonoscopy (as of today): 1/13/2022  Physician performing colonoscopy:  Abby Lake  Location of colonoscopy:  [facility]  Bowel prep reviewed with patient: Clenpiq  Instructions reviewed with patient by: ANCELMO Mohan  Clearances:   None

## 2022-01-10 ENCOUNTER — APPOINTMENT (OUTPATIENT)
Dept: PHYSICAL THERAPY | Facility: CLINIC | Age: 62
End: 2022-01-10
Payer: COMMERCIAL

## 2022-01-17 ENCOUNTER — APPOINTMENT (OUTPATIENT)
Dept: PHYSICAL THERAPY | Facility: CLINIC | Age: 62
End: 2022-01-17
Payer: COMMERCIAL

## 2022-01-21 ENCOUNTER — TELEPHONE (OUTPATIENT)
Dept: GASTROENTEROLOGY | Facility: CLINIC | Age: 62
End: 2022-01-21

## 2022-01-24 ENCOUNTER — APPOINTMENT (OUTPATIENT)
Dept: PHYSICAL THERAPY | Facility: CLINIC | Age: 62
End: 2022-01-24
Payer: COMMERCIAL

## 2022-01-31 ENCOUNTER — APPOINTMENT (OUTPATIENT)
Dept: PHYSICAL THERAPY | Facility: CLINIC | Age: 62
End: 2022-01-31
Payer: COMMERCIAL

## 2022-03-10 ENCOUNTER — TELEPHONE (OUTPATIENT)
Dept: GASTROENTEROLOGY | Facility: CLINIC | Age: 62
End: 2022-03-10

## 2022-03-10 NOTE — TELEPHONE ENCOUNTER
Scheduled date of colonoscopy (as of today): 3/18/22  Physician performing colonoscopy: Dr Florida Monahan  Location of colonoscopy: [facility]  Bowel prep reviewed with patient: Miralax  Instructions reviewed with patient by: Bruce Bhatti  Clearances: none

## 2022-03-14 DIAGNOSIS — G40.909 SEIZURE DISORDER (HCC): ICD-10-CM

## 2022-03-14 RX ORDER — PHENYTOIN SODIUM 100 MG/1
CAPSULE, EXTENDED RELEASE ORAL
Qty: 90 CAPSULE | Refills: 3 | Status: SHIPPED | OUTPATIENT
Start: 2022-03-14 | End: 2022-07-17

## 2022-03-18 ENCOUNTER — HOSPITAL ENCOUNTER (OUTPATIENT)
Dept: GASTROENTEROLOGY | Facility: AMBULATORY SURGERY CENTER | Age: 62
Discharge: HOME | End: 2022-03-18
Payer: COMMERCIAL

## 2022-03-18 ENCOUNTER — ANESTHESIA EVENT (OUTPATIENT)
Dept: GASTROENTEROLOGY | Facility: AMBULATORY SURGERY CENTER | Age: 62
End: 2022-03-18

## 2022-03-18 ENCOUNTER — ANESTHESIA (OUTPATIENT)
Dept: GASTROENTEROLOGY | Facility: AMBULATORY SURGERY CENTER | Age: 62
End: 2022-03-18

## 2022-03-18 VITALS
TEMPERATURE: 97.4 F | HEART RATE: 78 BPM | SYSTOLIC BLOOD PRESSURE: 146 MMHG | OXYGEN SATURATION: 97 % | DIASTOLIC BLOOD PRESSURE: 80 MMHG | RESPIRATION RATE: 20 BRPM

## 2022-03-18 DIAGNOSIS — Z85.038 PERSONAL HISTORY OF COLON CANCER: ICD-10-CM

## 2022-03-18 PROCEDURE — 45385 COLONOSCOPY W/LESION REMOVAL: CPT | Performed by: INTERNAL MEDICINE

## 2022-03-18 PROCEDURE — 45380 COLONOSCOPY AND BIOPSY: CPT | Performed by: INTERNAL MEDICINE

## 2022-03-18 RX ORDER — SODIUM CHLORIDE, SODIUM LACTATE, POTASSIUM CHLORIDE, CALCIUM CHLORIDE 600; 310; 30; 20 MG/100ML; MG/100ML; MG/100ML; MG/100ML
50 INJECTION, SOLUTION INTRAVENOUS CONTINUOUS
Status: DISCONTINUED | OUTPATIENT
Start: 2022-03-18 | End: 2022-03-22 | Stop reason: HOSPADM

## 2022-03-18 RX ORDER — LIDOCAINE HYDROCHLORIDE 10 MG/ML
INJECTION, SOLUTION EPIDURAL; INFILTRATION; INTRACAUDAL; PERINEURAL AS NEEDED
Status: DISCONTINUED | OUTPATIENT
Start: 2022-03-18 | End: 2022-03-18

## 2022-03-18 RX ORDER — SODIUM CHLORIDE, SODIUM LACTATE, POTASSIUM CHLORIDE, CALCIUM CHLORIDE 600; 310; 30; 20 MG/100ML; MG/100ML; MG/100ML; MG/100ML
50 INJECTION, SOLUTION INTRAVENOUS CONTINUOUS
Status: CANCELLED | OUTPATIENT
Start: 2022-03-18

## 2022-03-18 RX ORDER — GLYCOPYRROLATE 0.2 MG/ML
INJECTION INTRAMUSCULAR; INTRAVENOUS AS NEEDED
Status: DISCONTINUED | OUTPATIENT
Start: 2022-03-18 | End: 2022-03-18

## 2022-03-18 RX ORDER — PROPOFOL 10 MG/ML
INJECTION, EMULSION INTRAVENOUS AS NEEDED
Status: DISCONTINUED | OUTPATIENT
Start: 2022-03-18 | End: 2022-03-18

## 2022-03-18 RX ADMIN — PROPOFOL 100 MG: 10 INJECTION, EMULSION INTRAVENOUS at 14:49

## 2022-03-18 RX ADMIN — GLYCOPYRROLATE 0.2 MG: 0.2 INJECTION INTRAMUSCULAR; INTRAVENOUS at 14:51

## 2022-03-18 RX ADMIN — LIDOCAINE HYDROCHLORIDE 50 MG: 10 INJECTION, SOLUTION EPIDURAL; INFILTRATION; INTRACAUDAL; PERINEURAL at 14:49

## 2022-03-18 RX ADMIN — PROPOFOL 30 MG: 10 INJECTION, EMULSION INTRAVENOUS at 14:54

## 2022-03-18 RX ADMIN — SODIUM CHLORIDE, SODIUM LACTATE, POTASSIUM CHLORIDE, CALCIUM CHLORIDE 50 ML/HR: 600; 310; 30; 20 INJECTION, SOLUTION INTRAVENOUS at 14:43

## 2022-03-18 RX ADMIN — PROPOFOL 40 MG: 10 INJECTION, EMULSION INTRAVENOUS at 14:55

## 2022-03-18 RX ADMIN — PROPOFOL 30 MG: 10 INJECTION, EMULSION INTRAVENOUS at 14:59

## 2022-03-18 RX ADMIN — PROPOFOL 40 MG: 10 INJECTION, EMULSION INTRAVENOUS at 15:03

## 2022-03-18 NOTE — ANESTHESIA PREPROCEDURE EVALUATION
Procedure:  COLONOSCOPY    Relevant Problems   ANESTHESIA (within normal limits)      CARDIO   (+) Benign essential hypertension   (+) Hyperlipidemia      GI/HEPATIC   (+) Esophageal reflux      /RENAL   (+) CKD (chronic kidney disease) stage 3, GFR 30-59 ml/min (HCC)   (+) Renal cell carcinoma, right (HCC)      MUSCULOSKELETAL   (+) Lumbar spondylosis      NEURO/PSYCH   (+) Chronic pain syndrome   (+) History of prostate cancer   (+) Seizure, epileptic (HCC)      PULMONARY   (+) Severe obstructive sleep apnea      Other   (+) CPAP (continuous positive airway pressure) dependence        Physical Exam    Airway    Mallampati score: II  TM Distance: >3 FB  Neck ROM: full     Dental   No notable dental hx     Cardiovascular  Cardiovascular exam normal    Pulmonary  Pulmonary exam normal     Other Findings        Anesthesia Plan  ASA Score- 3     Anesthesia Type- IV sedation with anesthesia with ASA Monitors  Additional Monitors:   Airway Plan:     Comment: I discussed risks (reviewed with patient on the anesthesia consent form), benefits and alternatives of monitored sedation including the possibility under sedation to have recall or mild discomfort          Plan Factors-    Chart reviewed  Patient summary reviewed  Induction- intravenous  Postoperative Plan-     Informed Consent- Anesthetic plan and risks discussed with patient  I personally reviewed this patient with the CRNA  Discussed and agreed on the Anesthesia Plan with the CRNA  Paris Cevallos

## 2022-03-18 NOTE — H&P
History and Physical - [facility]  Lino Styles 59 y o  male MRN: [id number]    HPI: Lino Styles is a 59y o  year old male who presents for colonoscopy for history of colon polyps  Patient with prostate cancer may undergo further treatment for this    REVIEW OF SYSTEMS: Per the HPI, and otherwise unremarkable      Historical Information   Past Medical History:   Diagnosis Date    Accelerated essential hypertension     Acquired spondylolisthesis     Atypical chest pain     CA of prostate (HCC)     CPAP (continuous positive airway pressure) dependence     De Quervain's tenosynovitis     Epileptic seizure ([address] )     Esophageal reflux     Fatigue     Hematuria     Hyperglycemia     last assessed: 3/28/2013    Hyperlipidemia     Impaired fasting glucose     Lightheadedness     Lower back pain     Lumbar foraminal stenosis     Lumbar spondylosis     Malignant melanoma of skin (HCC)     Microscopic hematuria     last assessed: 6/16/2014    Obesity     Plantar fasciitis     Polyp of sigmoid colon     PONV (postoperative nausea and vomiting)     Renal cell carcinoma, right (HCC)     Right kidney mass     Sleep apnea     Spinal stenosis     Status post medial meniscectomy of right knee 7/21/2021     Past Surgical History:   Procedure Laterality Date    CHOLECYSTECTOMY      HERNIA REPAIR      NEPHRECTOMY Left     DE KNEE SCOPE,MED/LAT MENISECTOMY Right 5/27/2020    Procedure: MEDIAL Menisectomy Right knee;  Surgeon: Khadijah Patrick DO;  Location:  MAIN OR;  Service: Orthopedics    PROSTATECTOMY       Social History   Social History     Substance and Sexual Activity   Alcohol Use No     Social History     Substance and Sexual Activity   Drug Use No     Social History     Tobacco Use   Smoking Status Never Smoker   Smokeless Tobacco Never Used   Tobacco Comment    nonsmoker     Family History   Problem Relation Age of Onset    Hypertension Mother         essential    Diabetes Father         mellitus    Tremor Father         involuntary shaking or tremblin movements (tremor)    Hypertension Brother         essential       Meds/Allergies       Current Outpatient Medications:     amLODIPine (NORVASC) 5 mg tablet    phenytoin (DILANTIN) 100 mg ER capsule    Sod Picosulfate-Mag Ox-Cit Acd (Clenpiq) 10-3 5-12 MG-GM -GM/160ML SOLN    clotrimazole 1 % external solution    Diclofenac Sodium (VOLTAREN) 1 %    Current Facility-Administered Medications:     lactated ringers infusion, 50 mL/hr, Intravenous, Continuous, 50 mL/hr at 03/18/22 1443    Allergies   Allergen Reactions    Ciprofloxacin Other (See Comments)     Other reaction(s): RAPID IRREGULAR HEART BEAT    Fluticasone-Salmeterol GI Intolerance       Objective     BP (!) 177/100   Pulse 72   Temp (!) 97 4 °F (36 3 °C) (Temporal)   Resp 18   SpO2 95%     PHYSICAL EXAM    Gen: NAD AAOx3  Head: Normocephalic, Atraumatic  CV: S1S2 RRR no m/r/g  CHEST: Clear b/l no c/r/w  ABD: soft, +BS NT/ND  EXT: no edema    ASSESSMENT/PLAN:  This is a 59y o  year old male here for colonoscopy, and he is stable and optimized for his procedure

## 2022-03-18 NOTE — DISCHARGE INSTRUCTIONS
Colonoscopy   WHAT YOU NEED TO KNOW:   A colonoscopy is a procedure to examine the inside of your colon (intestine) with a scope  Polyps or tissue growths may have been removed during your colonoscopy  It is normal to feel bloated and to have some abdominal discomfort  You should be passing gas  If you have hemorrhoids or you had polyps removed, you may have a small amount of bleeding  DISCHARGE INSTRUCTIONS:   Seek care immediately if:    You have sudden, severe abdominal pain   You have problems swallowing   You have a large amount of black, sticky bowel movements or blood in your bowel movements   You have sudden trouble breathing   You feel weak, lightheaded, or faint or your heart beats faster than normal for you  Contact your healthcare provider if:    You have a fever and chills   You have nausea or are vomiting   Your abdomen is bloated or feels full and hard   You have abdominal pain   You have black, sticky bowel movements or blood in your bowel movements   You have not had a bowel movement for 3 days after your procedure   You have rash or hives   You have questions or concerns about your procedure  Activity:    Do not lift, strain, or run for 24 hours after your procedure   Rest after your procedure  You have been given medicine to relax you  Do not drive or make important decisions until the day after your procedure  Return to your normal activity as directed   Relieve gas and discomfort from bloating by lying on your right side with a heating pad on your abdomen  You may need to take short walks to help the gas move out  Eat small meals until bloating is relieved  Follow up with your healthcare provider as directed: Write down your questions so you remember to ask them during your visits  If you take a blood thinner, please review the specific instructions from your endoscopist about when you should resume it   These can be found in the Recommendation and Your Medication list sections of this After Visit Summary  Hemorrhoids   WHAT YOU NEED TO KNOW:   What are hemorrhoids? Hemorrhoids are swollen blood vessels inside your rectum (internal hemorrhoids) or on your anus (external hemorrhoids)  Sometimes a hemorrhoid may prolapse  This means it extends out of your anus  What increases my risk for hemorrhoids? · Pregnancy or obesity    · Straining or sitting for a long time during bowel movements    · Liver disease    · Weak muscles around the anus caused by older age, rectal surgery, or anal intercourse    · A lack of physical activity    · Chronic diarrhea or constipation    · A low-fiber diet    What are the signs and symptoms of hemorrhoids? · Pain or itching around your anus or inside your rectum    · Swelling or bumps around your anus    · Bright red blood in your bowel movement, on the toilet paper, or in the toilet bowl    · Tissue bulging out of your anus (prolapsed hemorrhoids)    · Incontinence (poor control over urine or bowel movements)    How are hemorrhoids diagnosed? Your healthcare provider will ask about your symptoms, the foods you eat, and your bowel movements  He or she will examine your anus for external hemorrhoids  You may need the following:  · A digital rectal exam  is a test to check for hemorrhoids  Your healthcare provider will put a gloved finger inside your anus to feel for the hemorrhoids  · An anoscopy  is a test that uses a scope (small tube with a light and camera on the end) to look at your hemorrhoids  How are hemorrhoids treated? Treatment will depend on your symptoms  You may need any of the following:  · Medicines  can help decrease pain and swelling, and soften your bowel movement  The medicine may be a pill, pad, cream, or ointment  · Procedures  may be used to shrink or remove your hemorrhoid  Examples include rubber-band ligation, sclerotherapy, and photocoagulation   These procedures may be done in your healthcare provider's office  Ask your healthcare provider for more information about these procedures  · Surgery  may be needed to shrink or remove your hemorrhoids  How can I manage my symptoms? · Apply ice on your anus for 15 to 20 minutes every hour or as directed  Use an ice pack, or put crushed ice in a plastic bag  Cover it with a towel before you apply it to your anus  Ice helps prevent tissue damage and decreases swelling and pain  · Take a sitz bath  Fill a bathtub with 4 to 6 inches of warm water  You may also use a sitz bath pan that fits inside a toilet bowl  Sit in the sitz bath for 15 minutes  Do this 3 times a day, and after each bowel movement  The warm water can help decrease pain and swelling  · Keep your anal area clean  Gently wash the area with warm water daily  Soap may irritate the area  After a bowel movement, wipe with moist towelettes or wet toilet paper  Dry toilet paper can irritate the area  How can I help prevent hemorrhoids? · Do not strain to have a bowel movement  Do not sit on the toilet too long  These actions can increase pressure on the tissues in your rectum and anus  · Drink plenty of liquids  Liquids can help prevent constipation  Ask how much liquid to drink each day and which liquids are best for you  · Eat a variety of high-fiber foods  Examples include fruits, vegetables, and whole grains  Ask your healthcare provider how much fiber you need each day  You may need to take a fiber supplement  · Exercise as directed  Exercise, such as walking, may make it easier to have a bowel movement  Ask your healthcare provider to help you create an exercise plan  · Do not have anal sex  Anal sex can weaken the skin around your rectum and anus  · Avoid heavy lifting  This can cause straining and increase your risk for another hemorrhoid  When should I seek immediate care?    · You have severe pain in your rectum or around your anus  · You have severe pain in your abdomen and you are vomiting  · You have bleeding from your anus that soaks through your underwear  When should I contact my healthcare provider? · You have frequent and painful bowel movements  · Your hemorrhoid looks or feels more swollen than usual      · You do not have a bowel movement for 2 days or more  · You see or feel tissue coming through your anus  · You have questions or concerns about your condition or care  CARE AGREEMENT:   You have the right to help plan your care  Learn about your health condition and how it may be treated  Discuss treatment options with your healthcare providers to decide what care you want to receive  You always have the right to refuse treatment  The above information is an  only  It is not intended as medical advice for individual conditions or treatments  Talk to your doctor, nurse or pharmacist before following any medical regimen to see if it is safe and effective for you  © Copyright [employer] 2022 Information is for End User's use only and may not be sold, redistributed or otherwise used for commercial purposes  All illustrations and images included in CareNotes® are the copyrighted property of A D A M , Inc  or [facility] Wilian Aguirre   Colorectal Polyps   WHAT YOU NEED TO KNOW:   What are colorectal polyps? Colorectal polyps are small growths of tissue in the lining of the colon and rectum  Most polyps are usually benign (not cancer)  Certain types of polyps, called adenomatous polyps, may turn into cancer  What increases my risk for colorectal polyps? The exact cause of colorectal polyps is unknown   The following may increase your risk:  · Older age    · Foods high in fat and low in fiber    · Family history of polyps    · Intestinal diseases, such as Crohn disease or ulcerative colitis    · Smoking cigarettes or drinking alcohol    · Lack of physical activity, such as exercise    · Obesity    What are the signs and symptoms of colorectal polyps? · Blood in your bowel movement or bleeding from the rectum    · Change in bowel movement habits, such as diarrhea or constipation    · Abdominal pain    What do I need to know about colorectal polyp screening and diagnosis? Screening means you are checked for polyps that may be cancer, even if you do not have signs or symptoms  Screening is recommended starting at age 48 and continuing to age 76 if you are at average risk  Your healthcare provider may suggest screening starting at age 39  Screening may start before you are 45 or continue after you are 75 if your risk is high  Your provider will tell you how often to get screened  Timing depends on the type of screening and if polyps are found  Timing also depends on your age and if you are at increased risk for cancer  Screening may be recommended every 1, 2, 5, or 10 years  Your healthcare provider will need to test polyps to find out if they are cancer  Any of the following may be used to find polyps:  · A digital rectal exam  means your provider will use a finger to check for polyps  · A barium enema  is an x-ray of the colon  A tube is put into your anus, and a liquid called barium is put through the tube  Barium is used so that healthcare providers can see your colon better on the x-ray film  · A virtual colonoscopy  is a CT scan that takes pictures of the inside of your colon and rectum  A small, flexible tube is put into your rectum and air or carbon dioxide (gas) is used to expand your colon  This lets healthcare providers clearly see your colon and any polyps on a monitor  · Colonoscopy or sigmoidoscopy  are procedures to help your provider see the inside of your colon  He or she will use a flexible tube with a small light and camera on the end  During a sigmoidoscopy, your provider will only look at rectum and lower colon   During a colonoscopy, he or she will look at the full length of your colon  A small amount of tissue may be removed from your colon for tests  How are colorectal polyps treated? Small, benign polyps may not need treatment  Your healthcare provider will check the polyp over time to make sure it is not changing  Polyps that are cancer may be removed with one of the following:  · A polypectomy  is a minimally invasive procedure to remove a polyp during a colonoscopy or sigmoidoscopy  Your healthcare provider may need to remove the polyp with a laparoscope  Laparoscopy is done by inserting a small, flexible scope into incisions made on your abdomen  · Surgery  may be needed to remove large or deep polyps that cannot be removed in a polypectomy  Tissues or lymph nodes near a polyp may also be removed  This helps stop cancer from spreading  What can I do to lower my risk for colorectal polyps? · Eat a variety of healthy foods  Healthy foods include fruit, vegetables, whole-grain breads, low-fat dairy products, beans, lean meat, and fish  Ask if you need to be on a special diet  · Maintain a healthy weight  Ask your healthcare provider what a healthy weight is for you  Ask him or her to help with a weight loss plan if you are overweight  · Exercise as directed  Begin to exercise slowly and do more as you get stronger  Talk with your healthcare provider before you start an exercise program          · Limit alcohol  Your risk for polyps increases the more you drink  · Do not smoke  Nicotine and other chemicals in cigarettes and cigars increase your risk for polyps  Ask your healthcare provider for information if you currently smoke and need help to quit  E-cigarettes or smokeless tobacco still contain nicotine  Talk to your healthcare provider before you use these products  Where can I find more information?    · [address] ([place])  [address] [place] [address]  Phone: 3- 773 - 032-4055  Web Address: www digestive  [facility]    Call your local emergency number (911 in the [address]) if:   · You have sudden shortness of breath  · You have a fast heart rate, fast breathing, or are too dizzy to stand up  When should I seek immediate care? · You have severe abdominal pain  · You see blood in your bowel movement  When should I call my doctor? · You have a fever  · You have chills, a cough, or feel weak and achy  · You have abdominal pain that does not go away or gets worse after you take medicine  · Your abdomen is swollen  · You are losing weight without trying  · You have questions or concerns about your condition or care  CARE AGREEMENT:   You have the right to help plan your care  Learn about your health condition and how it may be treated  Discuss treatment options with your healthcare providers to decide what care you want to receive  You always have the right to refuse treatment  The above information is an  only  It is not intended as medical advice for individual conditions or treatments  Talk to your doctor, nurse or pharmacist before following any medical regimen to see if it is safe and effective for you  © Copyright [employer] 2022 Information is for End User's use only and may not be sold, redistributed or otherwise used for commercial purposes   All illustrations and images included in CareNotes® are the copyrighted property of A D A M , Inc  or [facility] [employer]

## 2022-03-18 NOTE — ANESTHESIA POSTPROCEDURE EVALUATION
Post-Op Assessment Note    CV Status:  Stable  Pain Score: 0    Pain management: adequate     Mental Status:  Awake   Hydration Status:  Stable   PONV Controlled:  None   Airway Patency:  Patent      Post Op Vitals Reviewed: Yes      Staff: CRNA         No complications documented      BP  136/82   Temp     Pulse 63   Resp 14   SpO2 93%

## 2022-03-23 ENCOUNTER — OFFICE VISIT (OUTPATIENT)
Dept: FAMILY MEDICINE CLINIC | Facility: CLINIC | Age: 62
End: 2022-03-23
Payer: COMMERCIAL

## 2022-03-23 VITALS
WEIGHT: 243 LBS | BODY MASS INDEX: 38.14 KG/M2 | HEART RATE: 56 BPM | SYSTOLIC BLOOD PRESSURE: 124 MMHG | OXYGEN SATURATION: 97 % | DIASTOLIC BLOOD PRESSURE: 80 MMHG | HEIGHT: 67 IN | TEMPERATURE: 97.6 F

## 2022-03-23 DIAGNOSIS — Z85.46 HISTORY OF PROSTATE CANCER: ICD-10-CM

## 2022-03-23 DIAGNOSIS — I10 BENIGN ESSENTIAL HYPERTENSION: Primary | ICD-10-CM

## 2022-03-23 DIAGNOSIS — C64.1 RENAL CELL CARCINOMA, RIGHT (HCC): ICD-10-CM

## 2022-03-23 DIAGNOSIS — G40.909 NONINTRACTABLE EPILEPSY WITHOUT STATUS EPILEPTICUS, UNSPECIFIED EPILEPSY TYPE (HCC): ICD-10-CM

## 2022-03-23 PROCEDURE — 99213 OFFICE O/P EST LOW 20 MIN: CPT | Performed by: FAMILY MEDICINE

## 2022-03-23 PROCEDURE — 1036F TOBACCO NON-USER: CPT | Performed by: FAMILY MEDICINE

## 2022-03-23 PROCEDURE — 3008F BODY MASS INDEX DOCD: CPT | Performed by: FAMILY MEDICINE

## 2022-03-23 PROCEDURE — 3725F SCREEN DEPRESSION PERFORMED: CPT | Performed by: FAMILY MEDICINE

## 2022-03-23 RX ORDER — AMLODIPINE BESYLATE 10 MG/1
10 TABLET ORAL DAILY
Qty: 90 TABLET | Refills: 1 | Status: SHIPPED | OUTPATIENT
Start: 2022-03-23

## 2022-03-23 NOTE — PROGRESS NOTES
Subjective:   Chief Complaint   Patient presents with    Follow-up     pt here for 3 month f/u on blood pressure medication        Patient ID: Carl Powell is a 59 y o  male  Here for bp check      The following portions of the patient's history were reviewed and updated as appropriate: allergies, current medications, past family history, past medical history, past social history, past surgical history and problem list     Review of Systems   Constitutional: Negative for activity change, appetite change, chills, diaphoresis, fatigue and unexpected weight change  HENT: Negative for congestion, ear discharge, ear pain, hearing loss, nosebleeds and rhinorrhea  Eyes: Negative for pain, redness, itching and visual disturbance  Respiratory: Negative for cough, choking, chest tightness and shortness of breath  Cardiovascular: Negative for chest pain and leg swelling  Gastrointestinal: Negative for abdominal pain, blood in stool, constipation, diarrhea and nausea  Endocrine: Negative for cold intolerance, polydipsia and polyphagia  Genitourinary: Negative for dysuria, frequency, hematuria and urgency  Musculoskeletal: Negative for arthralgias, back pain, gait problem, joint swelling, neck pain and neck stiffness  Skin: Negative for color change and rash  Allergic/Immunologic: Negative for environmental allergies and food allergies  Neurological: Positive for weakness  Negative for dizziness, tremors, seizures, speech difficulty, numbness and headaches  Hematological: Negative for adenopathy  Does not bruise/bleed easily  Psychiatric/Behavioral: Negative for behavioral problems, dysphoric mood, hallucinations and self-injury  Objective:  Vitals:    03/23/22 0828   BP: 124/80   Pulse: 56   Temp: 97 6 °F (36 4 °C)   SpO2: 97%   Weight: 110 kg (243 lb)   Height: 5' 7" (1 702 m)      Physical Exam  Constitutional:       General: He is not in acute distress       Appearance: He is well-developed  He is not diaphoretic  HENT:      Head: Normocephalic and atraumatic  Right Ear: External ear normal       Left Ear: External ear normal       Nose: Nose normal       Mouth/Throat:      Pharynx: No oropharyngeal exudate  Eyes:      General: No scleral icterus  Right eye: No discharge  Left eye: No discharge  Conjunctiva/sclera: Conjunctivae normal       Pupils: Pupils are equal, round, and reactive to light  Neck:      Thyroid: No thyromegaly  Cardiovascular:      Rate and Rhythm: Normal rate and regular rhythm  Heart sounds: Normal heart sounds  No murmur heard  Pulmonary:      Effort: Pulmonary effort is normal       Breath sounds: Normal breath sounds  No wheezing or rales  Abdominal:      General: Bowel sounds are normal       Palpations: Abdomen is soft  There is no mass  Tenderness: There is no abdominal tenderness  There is no guarding  Musculoskeletal:         General: No tenderness  Normal range of motion  Cervical back: Normal range of motion and neck supple  Lymphadenopathy:      Cervical: No cervical adenopathy  Skin:     General: Skin is warm and dry  Neurological:      Mental Status: He is alert and oriented to person, place, and time  Deep Tendon Reflexes: Reflexes are normal and symmetric  Psychiatric:         Thought Content: Thought content normal          Judgment: Judgment normal            Assessment/Plan:    No problem-specific Assessment & Plan notes found for this encounter  Diagnoses and all orders for this visit:    Benign essential hypertension  -     amLODIPine (NORVASC) 10 mg tablet;  Take 1 tablet (10 mg total) by mouth daily    Renal cell carcinoma, right (HCC)    History of prostate cancer    Nonintractable epilepsy without status epilepticus, unspecified epilepsy type ([facility] [address] )        Increase the amlodipine to 5 mg daily

## 2022-03-25 NOTE — RESULT ENCOUNTER NOTE
Polyp is benign adenoma 5 year recall colonoscopy    Biopsy of the ileocecal valve is normal   Left a voicemail for the patient

## 2022-05-04 ENCOUNTER — OFFICE VISIT (OUTPATIENT)
Dept: FAMILY MEDICINE CLINIC | Facility: CLINIC | Age: 62
End: 2022-05-04
Payer: COMMERCIAL

## 2022-05-04 VITALS
SYSTOLIC BLOOD PRESSURE: 154 MMHG | OXYGEN SATURATION: 97 % | HEIGHT: 67 IN | TEMPERATURE: 97.2 F | HEART RATE: 61 BPM | DIASTOLIC BLOOD PRESSURE: 92 MMHG | WEIGHT: 242 LBS | BODY MASS INDEX: 37.98 KG/M2

## 2022-05-04 DIAGNOSIS — R07.9 CHEST PAIN, UNSPECIFIED TYPE: ICD-10-CM

## 2022-05-04 DIAGNOSIS — I10 BENIGN ESSENTIAL HYPERTENSION: Primary | ICD-10-CM

## 2022-05-04 PROCEDURE — 99214 OFFICE O/P EST MOD 30 MIN: CPT | Performed by: FAMILY MEDICINE

## 2022-05-04 PROCEDURE — 3008F BODY MASS INDEX DOCD: CPT | Performed by: FAMILY MEDICINE

## 2022-05-04 PROCEDURE — 1036F TOBACCO NON-USER: CPT | Performed by: FAMILY MEDICINE

## 2022-05-04 RX ORDER — ATENOLOL 50 MG/1
50 TABLET ORAL DAILY
Qty: 30 TABLET | Refills: 1 | Status: SHIPPED | OUTPATIENT
Start: 2022-05-04 | End: 2022-05-31

## 2022-05-04 NOTE — PROGRESS NOTES
Subjective:   Chief Complaint   Patient presents with    Follow-up     pt here for 6 week f/u on blood pressure        Patient ID: Evelio Gallardo is a 59 y o  male  Here for bp check  Feels well on the current meds  Home blood pressure readings have been elevated  He also had an episode of chest pain that was shooting across his chest   It was not associated with exertion and he was not diaphoretic  He has previously seen cardiology and had a nuclear stress test but that was over 5 years ago      The following portions of the patient's history were reviewed and updated as appropriate: allergies, current medications, past family history, past medical history, past social history, past surgical history and problem list     Review of Systems   Constitutional: Negative for activity change, appetite change, chills, diaphoresis, fatigue and unexpected weight change  HENT: Negative for congestion, ear discharge, ear pain, hearing loss, nosebleeds and rhinorrhea  Eyes: Negative for pain, redness, itching and visual disturbance  Respiratory: Negative for cough, choking, chest tightness and shortness of breath  Cardiovascular: Negative for chest pain and leg swelling  Gastrointestinal: Negative for abdominal pain, blood in stool, constipation, diarrhea and nausea  Endocrine: Negative for cold intolerance, polydipsia and polyphagia  Genitourinary: Negative for dysuria, frequency, hematuria and urgency  Musculoskeletal: Negative for arthralgias, back pain, gait problem, joint swelling, neck pain and neck stiffness  Skin: Negative for color change and rash  Allergic/Immunologic: Negative for environmental allergies and food allergies  Neurological: Negative for dizziness, tremors, seizures, speech difficulty, numbness and headaches  Hematological: Negative for adenopathy  Does not bruise/bleed easily     Psychiatric/Behavioral: Negative for behavioral problems, dysphoric mood, hallucinations and self-injury  Objective:  Vitals:    05/04/22 0805   BP: 154/92   Pulse: 61   Temp: (!) 97 2 °F (36 2 °C)   SpO2: 97%   Weight: 110 kg (242 lb)   Height: 5' 7" (1 702 m)      Physical Exam  Constitutional:       General: He is not in acute distress  Appearance: He is well-developed  He is not diaphoretic  HENT:      Head: Normocephalic and atraumatic  Right Ear: External ear normal       Left Ear: External ear normal       Nose: Nose normal       Mouth/Throat:      Pharynx: No oropharyngeal exudate  Eyes:      General: No scleral icterus  Right eye: No discharge  Left eye: No discharge  Conjunctiva/sclera: Conjunctivae normal       Pupils: Pupils are equal, round, and reactive to light  Neck:      Thyroid: No thyromegaly  Cardiovascular:      Rate and Rhythm: Normal rate and regular rhythm  Heart sounds: Normal heart sounds  No murmur heard  Pulmonary:      Effort: Pulmonary effort is normal       Breath sounds: Normal breath sounds  No wheezing or rales  Abdominal:      General: Bowel sounds are normal       Palpations: Abdomen is soft  There is no mass  Tenderness: There is no abdominal tenderness  There is no guarding  Musculoskeletal:         General: No tenderness  Normal range of motion  Cervical back: Normal range of motion and neck supple  Lymphadenopathy:      Cervical: No cervical adenopathy  Skin:     General: Skin is warm and dry  Neurological:      Mental Status: He is alert and oriented to person, place, and time  Deep Tendon Reflexes: Reflexes are normal and symmetric  Psychiatric:         Thought Content: Thought content normal          Judgment: Judgment normal            Assessment/Plan:    No problem-specific Assessment & Plan notes found for this encounter  Diagnoses and all orders for this visit:    Benign essential hypertension  -     atenolol (TENORMIN) 50 mg tablet;  Take 1 tablet (50 mg total) by mouth daily

## 2022-05-13 ENCOUNTER — HOSPITAL ENCOUNTER (OUTPATIENT)
Dept: NON INVASIVE DIAGNOSTICS | Age: 62
Discharge: HOME | End: 2022-05-13
Payer: COMMERCIAL

## 2022-05-13 DIAGNOSIS — I10 BENIGN ESSENTIAL HYPERTENSION: ICD-10-CM

## 2022-05-13 LAB
BASELINE ST DEPRESSION: 0 MM
CHEST PAIN STATEMENT: NORMAL
MAX DIASTOLIC BP: 86 MMHG
MAX HEART RATE: 96 BPM
MAX PREDICTED HEART RATE: 157 BPM
MAX. SYSTOLIC BP: 176 MMHG
NUC STRESS EJECTION FRACTION: 59 %
PROTOCOL NAME: NORMAL
REASON FOR TERMINATION: NORMAL
SL CV REST NUCLEAR ISOTOPE DOSE: 15.7 MCI
SL CV STRESS NUCLEAR ISOTOPE DOSE: 47.1 MCI
STRESS ANGINA INDEX: 0
STRESS BASELINE HR: 65 BPM
STRESS POST PEAK BP: 176 MMHG
STRESS ST DEPRESSION: 0 MM
STRESS/REST PERFUSION RATIO: 1.07
TARGET HR FORMULA: NORMAL
TEST INDICATION: NORMAL
TIME IN EXERCISE PHASE: NORMAL

## 2022-05-13 PROCEDURE — 93017 CV STRESS TEST TRACING ONLY: CPT

## 2022-05-13 PROCEDURE — A9502 TC99M TETROFOSMIN: HCPCS

## 2022-05-13 PROCEDURE — 93018 CV STRESS TEST I&R ONLY: CPT | Performed by: INTERNAL MEDICINE

## 2022-05-13 PROCEDURE — 78452 HT MUSCLE IMAGE SPECT MULT: CPT | Performed by: INTERNAL MEDICINE

## 2022-05-13 PROCEDURE — G1004 CDSM NDSC: HCPCS

## 2022-05-13 PROCEDURE — 93016 CV STRESS TEST SUPVJ ONLY: CPT | Performed by: INTERNAL MEDICINE

## 2022-05-13 PROCEDURE — 78452 HT MUSCLE IMAGE SPECT MULT: CPT

## 2022-05-13 RX ADMIN — REGADENOSON 0.4 MG: 0.08 INJECTION, SOLUTION INTRAVENOUS at 08:47

## 2022-05-30 DIAGNOSIS — I10 BENIGN ESSENTIAL HYPERTENSION: ICD-10-CM

## 2022-05-31 DIAGNOSIS — I10 BENIGN ESSENTIAL HYPERTENSION: ICD-10-CM

## 2022-05-31 RX ORDER — ATENOLOL 50 MG/1
50 TABLET ORAL DAILY
Qty: 90 TABLET | Refills: 1 | Status: SHIPPED | OUTPATIENT
Start: 2022-05-31

## 2022-05-31 RX ORDER — ATENOLOL 50 MG/1
TABLET ORAL
Qty: 90 TABLET | Refills: 1 | Status: SHIPPED | OUTPATIENT
Start: 2022-05-31 | End: 2022-05-31 | Stop reason: SDUPTHER

## 2022-07-17 DIAGNOSIS — G40.909 SEIZURE DISORDER (HCC): ICD-10-CM

## 2022-07-17 RX ORDER — PHENYTOIN SODIUM 100 MG/1
CAPSULE, EXTENDED RELEASE ORAL
Qty: 90 CAPSULE | Refills: 3 | Status: SHIPPED | OUTPATIENT
Start: 2022-07-17 | End: 2022-08-14

## 2022-09-15 DIAGNOSIS — I10 BENIGN ESSENTIAL HYPERTENSION: ICD-10-CM

## 2022-09-15 RX ORDER — AMLODIPINE BESYLATE 10 MG/1
TABLET ORAL
Qty: 30 TABLET | Refills: 5 | Status: SHIPPED | OUTPATIENT
Start: 2022-09-15 | End: 2022-10-13

## 2022-10-13 DIAGNOSIS — I10 BENIGN ESSENTIAL HYPERTENSION: ICD-10-CM

## 2022-10-13 RX ORDER — AMLODIPINE BESYLATE 10 MG/1
TABLET ORAL
Qty: 90 TABLET | Refills: 2 | Status: SHIPPED | OUTPATIENT
Start: 2022-10-13

## 2022-12-14 ENCOUNTER — APPOINTMENT (OUTPATIENT)
Dept: RADIOLOGY | Facility: MEDICAL CENTER | Age: 62
End: 2022-12-14

## 2022-12-14 ENCOUNTER — OFFICE VISIT (OUTPATIENT)
Dept: OBGYN CLINIC | Facility: MEDICAL CENTER | Age: 62
End: 2022-12-14

## 2022-12-14 VITALS — BODY MASS INDEX: 37.98 KG/M2 | HEIGHT: 67 IN | WEIGHT: 242 LBS

## 2022-12-14 DIAGNOSIS — M25.562 LEFT KNEE PAIN, UNSPECIFIED CHRONICITY: ICD-10-CM

## 2022-12-14 DIAGNOSIS — S83.242A OTHER TEAR OF MEDIAL MENISCUS, CURRENT INJURY, LEFT KNEE, INITIAL ENCOUNTER: Primary | ICD-10-CM

## 2022-12-14 NOTE — PROGRESS NOTES
Subjective:   Chief Complaint   Patient presents with    Knee Pain     PT COMES IN TODAY WITH WORSENING  RIGHT KNEE PAIN  Patient ID: Clemente Jaquez is a 62 y o  male  PATIENT IS HERE WITH COMPLAINT OF WORSENING RIGHT KNEE PAIN  HE WAS [place] AND HAD PLAIN X-RAYS WHICH WERE NORMAL  HE WAS WEARING A BRACE AND THIS HELPED AT 1ST  THE MORE HE IS ON HIS FEET AND PARTICULARLY WHEN HE HAS TO GO UP AND DOWN STEPS OR WORK ON LADDERS PERFORM REPETITIVE MOTION WITH HIS RIGHT KNEE THE WORSE THE PAIN GETS  HE GETS SWELLING ABOUT THE KNEE  THERE IS MEDIAL JOINT LINE TENDERNESS AND A FEELING OF INSTABILITY  HE GETS SOME POPLITEAL FULLNESS AS WELL  The following portions of the patient's history were reviewed and updated as appropriate: allergies, current medications, past family history, past medical history, past social history, past surgical history and problem list     Review of Systems   Constitutional: Negative for activity change, appetite change, chills, diaphoresis, fatigue and unexpected weight change  HENT: Negative for congestion, ear discharge, ear pain, hearing loss, nosebleeds and rhinorrhea  Eyes: Negative for pain, redness, itching and visual disturbance  Respiratory: Negative for cough, choking, chest tightness and shortness of breath  Cardiovascular: Negative for chest pain and leg swelling  Gastrointestinal: Negative for abdominal pain, blood in stool, constipation, diarrhea and nausea  Endocrine: Negative for cold intolerance, polydipsia and polyphagia  Genitourinary: Negative for dysuria, frequency, hematuria and urgency  Musculoskeletal: Positive for gait problem and joint swelling  Negative for arthralgias, back pain, neck pain and neck stiffness  KNEE PAIN   Skin: Negative for color change and rash  Allergic/Immunologic: Negative for environmental allergies and food allergies     Neurological: Negative for dizziness, tremors, seizures, speech difficulty, numbness and headaches  Hematological: Negative for adenopathy  Does not bruise/bleed easily  Psychiatric/Behavioral: Negative for behavioral problems, dysphoric mood, hallucinations and self-injury  Objective:  Vitals:    02/27/20 1643   BP: 122/88   Pulse: 73   Temp: 97 9 °F (36 6 °C)   SpO2: 93%   Weight: 116 kg (256 lb 4 oz)   Height: 5' 7" (1 702 m)      Physical Exam   Constitutional: He is oriented to person, place, and time  He appears well-developed and well-nourished  No distress  HENT:   Head: Normocephalic and atraumatic  Right Ear: External ear normal    Left Ear: External ear normal    Nose: Nose normal    Mouth/Throat: Oropharynx is clear and moist  No oropharyngeal exudate  Eyes: Pupils are equal, round, and reactive to light  Conjunctivae and EOM are normal  Right eye exhibits no discharge  Left eye exhibits no discharge  No scleral icterus  Neck: Normal range of motion  Neck supple  No thyromegaly present  Cardiovascular: Normal rate, regular rhythm and normal heart sounds  No murmur heard  Pulmonary/Chest: Effort normal and breath sounds normal  He has no wheezes  He has no rales  Abdominal: Soft  Bowel sounds are normal  He exhibits no mass  There is no tenderness  There is no guarding  Musculoskeletal: He exhibits tenderness  He exhibits no edema  RIGHT KNEE WITH MEDIAL JOINT LINE TENDERNESS AND POSITIVE MENISCAL SIGNS   Lymphadenopathy:     He has no cervical adenopathy  Neurological: He is alert and oriented to person, place, and time  He has normal reflexes  Skin: Skin is warm and dry  He is not diaphoretic  Psychiatric: He has a normal mood and affect  Judgment and thought content normal          Assessment/Plan:    No problem-specific Assessment & Plan notes found for this encounter         Diagnoses and all orders for this visit:    Acute torn meniscus of knee, right, initial encounter  -     MRI knee right  wo contrast; Future    Colon cancer screening  -     Ambulatory referral to Gastroenterology; Future    History of prostate cancer  -     CBC and differential; Future  -     Comprehensive metabolic panel; Future  -     PSA, total and free; Future  -     CBC and differential  -     Comprehensive metabolic panel  -     PSA, total and free    Memory loss  -     TSH, 3rd generation with Free T4 reflex; Future  -     TSH, 3rd generation with Free T4 reflex    Screening for thyroid disorder  -     TSH, 3rd generation with Free T4 reflex; Future  -     TSH, 3rd generation with Free T4 reflex    Screening for endocrine, nutritional, metabolic and immunity disorder  -     CBC and differential; Future  -     Comprehensive metabolic panel; Future  -     TSH, 3rd generation with Free T4 reflex; Future  -     CBC and differential  -     Comprehensive metabolic panel  -     TSH, 3rd generation with Free T4 reflex    Mixed hyperlipidemia  -     Lipid panel; Future  -     Lipid panel    Renal cell carcinoma, right (HCC)  -     CBC and differential; Future  -     Comprehensive metabolic panel; Future  -     CBC and differential  -     Comprehensive metabolic panel    CKD (chronic kidney disease) stage 3, GFR 30-59 ml/min (HCC)  -     CBC and differential; Future  -     Comprehensive metabolic panel; Future  -     CBC and differential  -     Comprehensive metabolic panel    Screening for deficiency anemia  -     CBC and differential; Future  -     CBC and differential    Polyp of sigmoid colon, unspecified type    Other orders  -     Cancel: Ambulatory referral to Gastroenterology; Future  -     phenytoin (DILANTIN) 100 mg ER capsule        HE WILL HAVE AN MRI OF HIS RIGHT KNEE  IF THERE IS SIGNIFICANT FINDINGS WE WILL HAVE HIM SEE ORTHOPEDIST  warm and dry/color normal

## 2022-12-14 NOTE — PROGRESS NOTES
Ortho Sports Medicine Knee Follow Up Visit     Assesment:     57 y o  male left knee possible medial meniscus tear    Plan:    Conservative treatment:    Ice to knee for 20 minutes at least 1-2 times daily  OTC NSAIDS prn for pain  Patient has tried and failed conservative treatment of PT and CSI in the left knee  Imaging: All imaging from today was reviewed by myself and explained to the patient  We will obtain an MRI of the knee to rule out medial meniscus tear  Follow up in 1-2 weeks for MRI review  Will make a definitive treatment plan based on the results of the MRI  Injection:    No Injection planned at this time  Surgery:     No surgery is recommended at this point, continue with conservative treatment plan as noted  Follow up:    Return for 1 week after MRI to review results  Chief Complaint   Patient presents with   • Left Knee - Follow-up       History of Present Illness: The patient is returns for follow up of recurrent Left knee pain  Since the prior visit, He reports no improvement  Patient reports that he continues to experience pain within the left knee  Patient reports that over the last few months the pain has gotten significantly worse  Patient reports that all of his pain is in the medial aspect of the knee  He is experiencing swelling and a locking sensation  Patient also is experiencing instability and giving out  Patient reports that there are times that he feels that he cannot trust the knee and is going to give out on him  Patient does get increased pain with twisting and pivoting type motions  Patient has trialed other conservative treatments of physical therapy and corticosteroid injections in the past   Patient continues to perform his home medical therapy exercise program that was given to him by physical therapy previously without any improvement of pain      Pain is located medial      Pain is improved by rest   Pain is aggravated by stairs, squatting, weight bearing, walking and pivoting on a planted foot  Symptoms include catching, locking, and swelling  The patient has tried rest, ice, NSAIDS, physical therapy and injection  Knee Surgical History:  5/27/2020- Right knee partial medial menisectomy, Dr Bonilla      Past Medical, Social and Family History:  Past Medical History:   Diagnosis Date   • Accelerated essential hypertension    • Acquired spondylolisthesis    • Atypical chest pain    • CA of prostate (HCC)    • CPAP (continuous positive airway pressure) dependence    • De Quervain's tenosynovitis    • Epileptic seizure ([facility] [address] )    • Esophageal reflux    • Fatigue    • Hematuria    • Hyperglycemia     last assessed: 3/28/2013   • Hyperlipidemia    • Impaired fasting glucose    • Lightheadedness    • Lower back pain    • Lumbar foraminal stenosis    • Lumbar spondylosis    • Malignant melanoma of skin (HCC)    • Microscopic hematuria     last assessed: 6/16/2014   • Obesity    • Plantar fasciitis    • Polyp of sigmoid colon    • PONV (postoperative nausea and vomiting)    • Renal cell carcinoma, right (HCC)    • Right kidney mass    • Sleep apnea    • Spinal stenosis    • Status post medial meniscectomy of right knee 7/21/2021     Past Surgical History:   Procedure Laterality Date   • CHOLECYSTECTOMY     • HERNIA REPAIR     • NEPHRECTOMY Left    • AL KNEE SCOPE,MED/LAT MENISECTOMY Right 5/27/2020    Procedure: MEDIAL Menisectomy Right knee;  Surgeon: Carla Fuller DO;  Location:  MAIN OR;  Service: Orthopedics   • PROSTATECTOMY       Allergies   Allergen Reactions   • Ciprofloxacin Other (See Comments)     Other reaction(s): RAPID IRREGULAR HEART BEAT   • Fluticasone-Salmeterol GI Intolerance     Current Outpatient Medications on File Prior to Visit   Medication Sig Dispense Refill   • amLODIPine (NORVASC) 10 mg tablet TAKE 1 TABLET BY MOUTH EVERY DAY 90 tablet 2   • phenytoin (DILANTIN) 100 mg ER capsule TAKE 1 CAPSULE BY MOUTH THREE TIMES A  capsule 2   • atenolol (TENORMIN) 50 mg tablet Take 1 tablet (50 mg total) by mouth daily (Patient not taking: Reported on 12/14/2022) 90 tablet 1     No current facility-administered medications on file prior to visit  Social History     Socioeconomic History   • Marital status: /Civil Union     Spouse name: Not on file   • Number of children: Not on file   • Years of education: Not on file   • Highest education level: Not on file   Occupational History   • Not on file   Tobacco Use   • Smoking status: Never   • Smokeless tobacco: Never   • Tobacco comments:     nonsmoker   Vaping Use   • Vaping Use: Never used   Substance and Sexual Activity   • Alcohol use: No   • Drug use: No   • Sexual activity: Not on file   Other Topics Concern   • Not on file   Social History Narrative   • Not on file     Social Determinants of Health     Financial Resource Strain: Not on file   Food Insecurity: Not on file   Transportation Needs: Not on file   Physical Activity: Not on file   Stress: Not on file   Social Connections: Not on file   Intimate Partner Violence: Not on file   Housing Stability: Not on file         I have reviewed the past medical, surgical, social and family history, medications and allergies as documented in the EMR  Review of systems: ROS is negative other than that noted in the HPI  Constitutional: Negative for fatigue and fever  Physical Exam:    Height 5' 7" (1 702 m), weight 110 kg (242 lb)  General/Constitutional: NAD, well developed, well nourished  HENT: Normocephalic, atraumatic  CV: Intact distal pulses, regular rate  Resp: No respiratory distress or labored breathing  GI: Soft and non-tender   Lymphatic: No lymphadenopathy palpated  Neuro: Alert and Oriented x 3, no focal deficits  Psych: Normal mood, normal affect, normal judgement, normal behavior  Skin: Warm, dry, no rashes, no erythema      Knee Exam (focused):                RIGHT LEFT   ROM: 0-130 0-130   Palpation: Effusion negative mild     MJL tenderness Negative Positive     LJL tenderness Negative Negative   Meniscus: Freida Negative Positive    Apley's Compression Negative Positive   Instability: Varus stable stable     Valgus stable Stable, pain  Medially with stress   Special Tests: Lachman Negative Negative     Posterior drawer Negative Negative     Anterior drawer Negative Negative     Pivot shift not tested not tested     Dial not tested not tested   Patella: Palpation no tenderness no tenderness     Mobility 1/4 1/4     Apprehension Negative Negative   Other: Single leg 1/4 squat not tested not tested           LE NV Exam: +2 DP/PT pulses bilaterally  Sensation intact to light touch L2-S1 bilaterally    No calf tenderness to palpation bilaterally      Knee Imaging    New Xrays were performed today of the left  knee and demonstrate mild patellofemoral OA  There was no radiology report available for review, but the report will be assessed when it is available

## 2023-05-25 DIAGNOSIS — G40.909 SEIZURE DISORDER (HCC): ICD-10-CM

## 2023-05-25 RX ORDER — PHENYTOIN SODIUM 100 MG/1
CAPSULE, EXTENDED RELEASE ORAL
Qty: 90 CAPSULE | Refills: 8 | Status: SHIPPED | OUTPATIENT
Start: 2023-05-25

## 2023-06-01 ENCOUNTER — TELEPHONE (OUTPATIENT)
Dept: OBGYN CLINIC | Facility: CLINIC | Age: 63
End: 2023-06-01

## 2023-06-01 ENCOUNTER — TELEPHONE (OUTPATIENT)
Dept: PAIN MEDICINE | Facility: CLINIC | Age: 63
End: 2023-06-01

## 2023-06-01 NOTE — TELEPHONE ENCOUNTER
Unable to leave message for patient to call to schedule appointment with Dr Lindsey Saxena  States to leave a message but then directs to a survey

## 2023-06-01 NOTE — TELEPHONE ENCOUNTER
Caller: Self    Doctor: Khadar Childress    Reason for call: Returning call, warm transferred to [facility]    Call back#: [phone]

## 2023-06-01 NOTE — TELEPHONE ENCOUNTER
Hello,  Please advise if the following patient can be forced onto the schedule:    Patient: Tj Garcia     : 1960     MRN: [id number]    Call back #: [phone]    Insurance: [employer]    Reason for appointment: f/u increase pain left knee requesting for appointment tomorrow // unable to bear weight / walk     Requested doctor/location: Dr Bonilla @ [facility]       Thank you        E-mail sent to [facility]

## 2023-06-02 ENCOUNTER — OFFICE VISIT (OUTPATIENT)
Dept: OBGYN CLINIC | Facility: MEDICAL CENTER | Age: 63
End: 2023-06-02

## 2023-06-02 VITALS
BODY MASS INDEX: 37.89 KG/M2 | HEIGHT: 67 IN | HEART RATE: 61 BPM | SYSTOLIC BLOOD PRESSURE: 134 MMHG | WEIGHT: 241.4 LBS | DIASTOLIC BLOOD PRESSURE: 83 MMHG

## 2023-06-02 DIAGNOSIS — M23.92 INTERNAL DERANGEMENT OF LEFT KNEE: Primary | ICD-10-CM

## 2023-06-02 NOTE — PROGRESS NOTES
Ortho Sports Medicine Knee Visit     Assesment:   left knee suspicious for medial meniscal tear    Plan:    Conservative treatment:    Ice to knee for 20 minutes at least 1-2 times daily  OTC NSAIDS prn for pain  See back to review MRI of left knee to determine future course of treatment  Imaging: We will obtain an MRI of the left knee to rule out medial meniscal tear      Injection:    No Injection planned at this time  Surgery:     No surgery is recommended at this point, continue with conservative treatment plan as noted  History of Present Illness: The patient is returns for follow up of his left knee  Patient was seen in December and MRI was ordered but patient never followed up as he got covid  He states he was layed up for a month and knee was feeling better  He was active walking up to 2 miles a day  He was recently in [place] doing a lot of walking and he states he started to increase sharp acute pain medial aspect of knee  He states yesterday he really wasn't even able to bear weight on knee secondary to pain  He thought he broke a bone  Difficulty with ambulation this morning  Pain medial based and sharp and acute in nature  Difficulty sleeping due to pain  Keeps knee extended for comfort, flexing knee is painful  Pain is improved by rest   Pain is aggravated by stairs, squatting, weight bearing, walking and pivoting on a planted foot  Symptoms include clicking and catching  The patient has tried rest, ice and NSAIDS  I have reviewed the past medical, surgical, social and family history, medications and allergies as documented in the EMR  Review of systems: ROS is negative other than that noted in the HPI  Constitutional: Negative for fatigue and fever     Cardiovascular: Negative for chest pain  Pulmonary: negative for shortness of breath    PMH/PSH:  Past Medical History:   Diagnosis Date   • Accelerated essential hypertension    • Acquired "spondylolisthesis    • Atypical chest pain    • CA of prostate (HCC)    • CPAP (continuous positive airway pressure) dependence    • De Quervain's tenosynovitis    • Epileptic seizure ([address] )    • Esophageal reflux    • Fatigue    • Hematuria    • Hyperglycemia     last assessed: 3/28/2013   • Hyperlipidemia    • Impaired fasting glucose    • Lightheadedness    • Lower back pain    • Lumbar foraminal stenosis    • Lumbar spondylosis    • Malignant melanoma of skin (HCC)    • Microscopic hematuria     last assessed: 6/16/2014   • Obesity    • Plantar fasciitis    • Polyp of sigmoid colon    • PONV (postoperative nausea and vomiting)    • Renal cell carcinoma, right (HCC)    • Right kidney mass    • Sleep apnea    • Spinal stenosis    • Status post medial meniscectomy of right knee 7/21/2021     Past Surgical History:   Procedure Laterality Date   • CHOLECYSTECTOMY     • HERNIA REPAIR     • NEPHRECTOMY Left    • [facility] KNE SURG W/MENISCECTOMY MED/LAT W/SHVG Right 5/27/2020    Procedure: MEDIAL Menisectomy Right knee;  Surgeon: Wen Galdamez DO;  Location: [facility];  Service: Orthopedics   • PROSTATECTOMY          Physical Exam:    Blood pressure 134/83, pulse 61, height 5' 7\" (1 702 m), weight 109 kg (241 lb 6 4 oz)  General/Constitutional: NAD, well developed, well nourished  HENT: Normocephalic, atraumatic  CV: Intact distal pulses, regular rate  Resp: No respiratory distress or labored breathing  Lymphatic: No lymphadenopathy palpated  Neuro: Alert and Oriented x 3, no focal deficits  Psych: Normal mood, normal affect, normal judgement, normal behavior  Skin: Warm, dry, no rashes, no erythema       Knee Exam (focused):                   RIGHT LEFT   ROM:   0-130 0-120 pain   Palpation: Effusion negative negative     MJL tenderness Negative Positive     LJL tenderness Negative Negative   Instability: Varus stable stable     Valgus stable stable   Special Tests: Lachman Negative Negative     Posterior drawer " Negative Negative     Anterior drawer Negative Negative     Pivot shift not tested not tested     Dial not tested not tested   Patella: Palpation no tenderness no tenderness     Mobility 1/4 1/4     Apprehension Negative Negative   Other: blaise not tested Positive       LE NV Exam: +2 DP/PT pulses bilaterally  Sensation intact to light touch L2-S1 bilaterally    No calf tenderness to palpation bilaterally      Knee Imaging    No new imaging to review      Scribe Attestation    I,:  Brian Maravilla am acting as a scribe while in the presence of the attending physician :       I,:  Dalila Castro, DO personally performed the services described in this documentation    as scribed in my presence :

## 2023-06-06 ENCOUNTER — TELEPHONE (OUTPATIENT)
Dept: FAMILY MEDICINE CLINIC | Facility: CLINIC | Age: 63
End: 2023-06-06

## 2023-06-06 NOTE — TELEPHONE ENCOUNTER
Pt called stating that he has some scars from a previous surgery that have started to split open  Has an appointment scheduled with you on the 25th for his wellness but wondering if he should be seen prior to that to address the issue with his scars  Please advise

## 2023-06-15 ENCOUNTER — OFFICE VISIT (OUTPATIENT)
Dept: FAMILY MEDICINE CLINIC | Facility: CLINIC | Age: 63
End: 2023-06-15
Payer: COMMERCIAL

## 2023-06-15 VITALS
WEIGHT: 244 LBS | SYSTOLIC BLOOD PRESSURE: 136 MMHG | OXYGEN SATURATION: 96 % | HEIGHT: 67 IN | HEART RATE: 68 BPM | TEMPERATURE: 97.8 F | BODY MASS INDEX: 38.3 KG/M2 | DIASTOLIC BLOOD PRESSURE: 88 MMHG

## 2023-06-15 DIAGNOSIS — C64.1 RENAL CELL CARCINOMA, RIGHT (HCC): ICD-10-CM

## 2023-06-15 DIAGNOSIS — E66.01 CLASS 2 SEVERE OBESITY WITH SERIOUS COMORBIDITY AND BODY MASS INDEX (BMI) OF 38.0 TO 38.9 IN ADULT, UNSPECIFIED OBESITY TYPE (HCC): ICD-10-CM

## 2023-06-15 DIAGNOSIS — I10 BENIGN ESSENTIAL HYPERTENSION: ICD-10-CM

## 2023-06-15 DIAGNOSIS — Z71.84 TRAVEL ADVICE ENCOUNTER: ICD-10-CM

## 2023-06-15 DIAGNOSIS — Z00.00 MEDICARE ANNUAL WELLNESS VISIT, INITIAL: Primary | ICD-10-CM

## 2023-06-15 DIAGNOSIS — E78.2 MIXED HYPERLIPIDEMIA: ICD-10-CM

## 2023-06-15 DIAGNOSIS — N18.31 STAGE 3A CHRONIC KIDNEY DISEASE (HCC): ICD-10-CM

## 2023-06-15 DIAGNOSIS — Z85.46 HISTORY OF PROSTATE CANCER: ICD-10-CM

## 2023-06-15 DIAGNOSIS — G40.909 NONINTRACTABLE EPILEPSY WITHOUT STATUS EPILEPTICUS, UNSPECIFIED EPILEPSY TYPE (HCC): ICD-10-CM

## 2023-06-15 PROCEDURE — 99213 OFFICE O/P EST LOW 20 MIN: CPT | Performed by: FAMILY MEDICINE

## 2023-06-15 PROCEDURE — G0402 INITIAL PREVENTIVE EXAM: HCPCS | Performed by: FAMILY MEDICINE

## 2023-06-15 RX ORDER — SCOLOPAMINE TRANSDERMAL SYSTEM 1 MG/1
1 PATCH, EXTENDED RELEASE TRANSDERMAL
Qty: 4 PATCH | Refills: 0 | Status: SHIPPED | OUTPATIENT
Start: 2023-06-15

## 2023-06-15 RX ORDER — LORAZEPAM 0.5 MG/1
TABLET ORAL
Qty: 20 TABLET | Refills: 0 | Status: SHIPPED | OUTPATIENT
Start: 2023-06-15

## 2023-06-15 NOTE — PROGRESS NOTES
Assessment and Plan:     Problem List Items Addressed This Visit        Cardiovascular and Mediastinum    Benign essential hypertension - Primary       Genitourinary    CKD (chronic kidney disease) stage 3, GFR 30-59 ml/min (HCC)       Other    History of prostate cancer    Hyperlipidemia       Depression Screening and Follow-up Plan: Patient was screened for depression during today's encounter  They screened negative with a PHQ-2 score of 0  Preventive health issues were discussed with patient, and age appropriate screening tests were ordered as noted in patient's After Visit Summary  Personalized health advice and appropriate referrals for health education or preventive services given if needed, as noted in patient's After Visit Summary  History of Present Illness:     Patient presents for a Medicare Wellness Visit    Here for awv   aslo planning on travel later this year  Has had panic attacks in the past while on planes also gets seasick     Patient Care Team:  Samuel Kim DO as PCP - General (Family Medicine)  Robert Haley MD     Review of Systems:     Review of Systems   Constitutional: Negative for activity change, appetite change, chills, diaphoresis, fatigue and unexpected weight change  HENT: Negative for congestion, ear discharge, ear pain, hearing loss, nosebleeds and rhinorrhea  Eyes: Negative for pain, redness, itching and visual disturbance  Respiratory: Negative for cough, choking, chest tightness and shortness of breath  Cardiovascular: Negative for chest pain and leg swelling  Gastrointestinal: Negative for abdominal pain, blood in stool, constipation, diarrhea and nausea  Endocrine: Negative for cold intolerance, polydipsia and polyphagia  Genitourinary: Negative for dysuria, frequency, hematuria and urgency  Musculoskeletal: Negative for arthralgias, back pain, gait problem, joint swelling, neck pain and neck stiffness  Skin: Negative for color change and rash  Allergic/Immunologic: Negative for environmental allergies and food allergies  Neurological: Negative for dizziness, tremors, seizures, speech difficulty, numbness and headaches  Hematological: Negative for adenopathy  Does not bruise/bleed easily  Psychiatric/Behavioral: Negative for behavioral problems, dysphoric mood, hallucinations and self-injury          Problem List:     Patient Active Problem List   Diagnosis   • Benign essential hypertension   • History of prostate cancer   • Esophageal reflux   • H/O unilateral nephrectomy   • Hyperlipidemia   • Intervertebral disc disorders with radiculopathy, lumbosacral region   • Lumbar foraminal stenosis   • Lumbar spondylosis   • Lumbosacral spinal stenosis   • Polyp of sigmoid colon   • Renal cell carcinoma, right ([facility])   • S/P prostatectomy   • Seizure, epileptic ([facility] [address] )   • Severe obstructive sleep apnea   • Class 2 obesity in adult   • Chronic pain syndrome   • CPAP (continuous positive airway pressure) dependence   • PLMD (periodic limb movement disorder)   • CKD (chronic kidney disease) stage 3, GFR 30-59 ml/min ([facility])   • Status post medial meniscectomy of right knee      Past Medical and Surgical History:     Past Medical History:   Diagnosis Date   • Accelerated essential hypertension    • Acquired spondylolisthesis    • Atypical chest pain    • CA of prostate ([facility])    • CPAP (continuous positive airway pressure) dependence    • De Quervain's tenosynovitis    • Epileptic seizure ([facility] )    • Esophageal reflux    • Fatigue    • Hematuria    • Hyperglycemia     last assessed: 3/28/2013   • Hyperlipidemia    • Impaired fasting glucose    • Lightheadedness    • Lower back pain    • Lumbar foraminal stenosis    • Lumbar spondylosis    • Malignant melanoma of skin ([facility])    • Microscopic hematuria     last assessed: 6/16/2014   • Obesity    • Plantar fasciitis    • Polyp of sigmoid colon    • PONV (postoperative nausea and vomiting)    • Prostate cancer ([facility] [facility] ) recevied radiation 5/2022 - 9/2022   • Renal cell carcinoma, right (HCC)    • Right kidney mass    • Sleep apnea    • Spinal stenosis    • Status post medial meniscectomy of right knee 07/21/2021     Past Surgical History:   Procedure Laterality Date   • CHOLECYSTECTOMY     • HERNIA REPAIR     • NEPHRECTOMY Left    • [facility] KNE SURG W/MENISCECTOMY MED/LAT W/SHVG Right 5/27/2020    Procedure: MEDIAL Menisectomy Right knee;  Surgeon: Wil Eisenberg DO;  Location:  MAIN OR;  Service: Orthopedics   • PROSTATECTOMY        Family History:     Family History   Problem Relation Age of Onset   • Hypertension Mother         essential   • Diabetes Father         mellitus   • Tremor Father         involuntary shaking or tremblin movements (tremor)   • Hypertension Brother         essential      Social History:     Social History     Socioeconomic History   • Marital status: /Civil Union     Spouse name: None   • Number of children: None   • Years of education: None   • Highest education level: None   Occupational History   • None   Tobacco Use   • Smoking status: Never   • Smokeless tobacco: Never   • Tobacco comments:     nonsmoker   Vaping Use   • Vaping Use: Never used   Substance and Sexual Activity   • Alcohol use: No   • Drug use: No   • Sexual activity: None   Other Topics Concern   • None   Social History Narrative   • None     Social Determinants of Health     Financial Resource Strain: Not on file   Food Insecurity: Not on file   Transportation Needs: Not on file   Physical Activity: Not on file   Stress: Not on file   Social Connections: Not on file   Intimate Partner Violence: Not on file   Housing Stability: Not on file      Medications and Allergies:     Current Outpatient Medications   Medication Sig Dispense Refill   • amLODIPine (NORVASC) 10 mg tablet TAKE 1 TABLET BY MOUTH EVERY DAY 90 tablet 2   • phenytoin (DILANTIN) 100 mg ER capsule TAKE 1 CAPSULE BY MOUTH THREE TIMES A DAY 90 capsule 8   • atenolol (TENORMIN) 50 mg tablet Take 1 tablet (50 mg total) by mouth daily (Patient not taking: Reported on 12/14/2022) 90 tablet 1     No current facility-administered medications for this visit  Allergies   Allergen Reactions   • Ciprofloxacin Other (See Comments)     Other reaction(s): RAPID IRREGULAR HEART BEAT   • Fluticasone-Salmeterol GI Intolerance      Immunizations:     Immunization History   Administered Date(s) Administered   • Tdap 08/20/2010, 12/15/2020      Health Maintenance:         Topic Date Due   • HIV Screening  Never done   • Colorectal Cancer Screening  03/17/2025   • Hepatitis C Screening  Completed         Topic Date Due   • COVID-19 Vaccine (1) Never done   • Influenza Vaccine (Season Ended) 09/01/2023      Medicare Screening Tests and Risk Assessments:     Jaspreet Villafuerte is here for his Welcome to Medicare visit  Health Risk Assessment:   Patient rates overall health as fair  Patient feels that their physical health rating is same  Patient is satisfied with their life  Eyesight was rated as same  Hearing was rated as slightly worse  Patient feels that their emotional and mental health rating is same  Patients states they are never, rarely angry  Patient states they are often unusually tired/fatigued  Pain experienced in the last 7 days has been a lot  Patient's pain rating has been 8/10  Patient states that he has experienced no weight loss or gain in last 6 months  Pt torn meniscus in left knee, currently getting that treated  Pt worried abt falling due to his torn meniscus    Depression Screening:   PHQ-2 Score: 0      Fall Risk Screening: In the past year, patient has experienced: history of falling in past year    Number of falls: 2 or more  Injured during fall?: No    Feels unsteady when standing or walking?: No    Worried about falling?: Yes      Home Safety:  Patient does not have trouble with stairs inside or outside of their home   Patient has working smoke alarms and has working carbon monoxide detector  Home safety hazards include: none  Nutrition:   Current diet is Regular  Medications:   Patient is not currently taking any over-the-counter supplements  Patient is able to manage medications  Pt only taking 2 prescriptions     Activities of Daily Living (ADLs)/Instrumental Activities of Daily Living (IADLs):   Walk and transfer into and out of bed and chair?: Yes  Dress and groom yourself?: Yes    Bathe or shower yourself?: Yes    Feed yourself? Yes  Do your laundry/housekeeping?: Yes  Manage your money, pay your bills and track your expenses?: Yes  Make your own meals?: Yes    Do your own shopping?: Yes    Previous Hospitalizations:   Any hospitalizations or ED visits within the last 12 months?: No      Advance Care Planning:   Living will: Yes    Advanced directive: Yes    Five wishes given: No      Cognitive Screening:   Provider or family/friend/caregiver concerned regarding cognition?: No    PREVENTIVE SCREENINGS      Cardiovascular Screening:    General: Screening Not Indicated and History Lipid Disorder      Colorectal Cancer Screening:     General: Screening Current      Prostate Cancer Screening:    General: History Prostate Cancer      Lung Cancer Screening:     General: Screening Not Indicated      Hepatitis C Screening:    General: Screening Current    Screening, Brief Intervention, and Referral to Treatment (SBIRT)    Screening  Typical number of drinks in a day: 0  Typical number of drinks in a week: 0  Interpretation: Low risk drinking behavior      AUDIT-C Screenin) How often did you have a drink containing alcohol in the past year? never  2) How many drinks did you have on a typical day when you were drinking in the past year? 0  3) How often did you have 6 or more drinks on one occasion in the past year? never    AUDIT-C Score: 0  Interpretation: Score 0-3 (male): Negative screen for alcohol misuse    Single Item Drug Screening:  How often have you used an illegal drug (including marijuana) or a prescription medication for non-medical reasons in the past year? never    Single Item Drug Screen Score: 0  Interpretation: Negative screen for possible drug use disorder    Other Counseling Topics:   Regular weightbearing exercise  No results found  Physical Exam:     Wt 111 kg (244 lb)   BMI 38 22 kg/m²     Physical Exam  Constitutional:       Appearance: He is well-developed  HENT:      Head: Normocephalic  Right Ear: External ear normal       Left Ear: External ear normal       Nose: Nose normal    Eyes:      Pupils: Pupils are equal, round, and reactive to light  Cardiovascular:      Rate and Rhythm: Normal rate and regular rhythm  Heart sounds: Normal heart sounds  Pulmonary:      Effort: Pulmonary effort is normal       Breath sounds: Normal breath sounds  Abdominal:      General: Bowel sounds are normal       Palpations: Abdomen is soft  Tenderness: There is no abdominal tenderness  Genitourinary:     Prostate: Normal    Musculoskeletal:         General: Normal range of motion  Cervical back: Normal range of motion and neck supple  Skin:     General: Skin is warm and dry  Findings: Lesion present  Comments: 2 open lesions of the anterior abdomen likely from retained stitches   Neurological:      Mental Status: He is alert and oriented to person, place, and time  Deep Tendon Reflexes: Reflexes normal    Psychiatric:         Behavior: Behavior normal          Thought Content: Thought content normal          Judgment: Judgment normal         He will use Transderm scopolamine patches for seasickness  He will take Ativan before his plane trip    He should cover the abdominal lesions with Neosporin and Band-Aids  Janet Todd DO

## 2023-06-15 NOTE — PATIENT INSTRUCTIONS
Medicare Preventive Visit Patient Instructions  Thank you for completing your Welcome to Medicare Visit or Medicare Annual Wellness Visit today  Your next wellness visit will be due in one year (6/15/2024)  The screening/preventive services that you may require over the next 5-10 years are detailed below  Some tests may not apply to you based off risk factors and/or age  Screening tests ordered at today's visit but not completed yet may show as past due  Also, please note that scanned in results may not display below  Preventive Screenings:  Service Recommendations Previous Testing/Comments   Colorectal Cancer Screening  · Colonoscopy    · Fecal Occult Blood Test (FOBT)/Fecal Immunochemical Test (FIT)  · Fecal DNA/Cologuard Test  · Flexible Sigmoidoscopy Age: 39-70 years old   Colonoscopy: every 10 years (May be performed more frequently if at higher risk)  OR  FOBT/FIT: every 1 year  OR  Cologuard: every 3 years  OR  Sigmoidoscopy: every 5 years  Screening may be recommended earlier than age 39 if at higher risk for colorectal cancer  Also, an individualized decision between you and your healthcare provider will decide whether screening between the ages of 74-80 would be appropriate   Colonoscopy: 03/18/2022  FOBT/FIT: Not on file  Cologuard: Not on file  Sigmoidoscopy: Not on file    Screening Current     Prostate Cancer Screening Individualized decision between patient and health care provider in men between ages of 53-78   Medicare will cover every 12 months beginning on the day after your 50th birthday PSA: 0 4 ng/mL     History Prostate Cancer     Hepatitis C Screening Once for adults born between 1945 and 1965  More frequently in patients at high risk for Hepatitis C Hep C Antibody: 12/04/2018    Screening Current   Diabetes Screening 1-2 times per year if you're at risk for diabetes or have pre-diabetes Fasting glucose: No results in last 5 years (No results in last 5 years)  A1C: No results in last 5 years (No results in last 5 years)      Cholesterol Screening Once every 5 years if you don't have a lipid disorder  May order more often based on risk factors  Lipid panel: 09/10/2021  Screening Not Indicated  History Lipid Disorder      Other Preventive Screenings Covered by Medicare:  1  Abdominal Aortic Aneurysm (AAA) Screening: covered once if your at risk  You're considered to be at risk if you have a family history of AAA or a male between the age of 73-68 who smoking at least 100 cigarettes in your lifetime  2  Lung Cancer Screening: covers low dose CT scan once per year if you meet all of the following conditions: (1) Age 50-69; (2) No signs or symptoms of lung cancer; (3) Current smoker or have quit smoking within the last 15 years; (4) You have a tobacco smoking history of at least 20 pack years (packs per day x number of years you smoked); (5) You get a written order from a healthcare provider  3  Glaucoma Screening: covered annually if you're considered high risk: (1) You have diabetes OR (2) Family history of glaucoma OR (3)  aged 48 and older OR (3)  American aged 72 and older  3  Osteoporosis Screening: covered every 2 years if you meet one of the following conditions: (1) Have a vertebral abnormality; (2) On glucocorticoid therapy for more than 3 months; (3) Have primary hyperparathyroidism; (4) On osteoporosis medications and need to assess response to drug therapy  5  HIV Screening: covered annually if you're between the age of 12-76  Also covered annually if you are younger than 13 and older than 72 with risk factors for HIV infection  For pregnant patients, it is covered up to 3 times per pregnancy      Immunizations:  Immunization Recommendations   Influenza Vaccine Annual influenza vaccination during flu season is recommended for all persons aged >= 6 months who do not have contraindications   Pneumococcal Vaccine   * Pneumococcal conjugate vaccine = PCV13 (Prevnar 13), PCV15 (Vaxneuvance), PCV20 (Prevnar 20)  * Pneumococcal polysaccharide vaccine = PPSV23 (Pneumovax) Adults 2364 years old: 1-3 doses may be recommended based on certain risk factors  Adults 72 years old: 1-2 doses may be recommended based off what pneumonia vaccine you previously received   Hepatitis B Vaccine 3 dose series if at intermediate or high risk (ex: diabetes, end stage renal disease, liver disease)   Tetanus (Td) Vaccine - COST NOT COVERED BY MEDICARE PART B Following completion of primary series, a booster dose should be given every 10 years to maintain immunity against tetanus  Td may also be given as tetanus wound prophylaxis  Tdap Vaccine - COST NOT COVERED BY MEDICARE PART B Recommended at least once for all adults  For pregnant patients, recommended with each pregnancy  Shingles Vaccine (Shingrix) - COST NOT COVERED BY MEDICARE PART B  2 shot series recommended in those aged 48 and above     Health Maintenance Due:      Topic Date Due   • HIV Screening  Never done   • Colorectal Cancer Screening  03/17/2025   • Hepatitis C Screening  Completed     Immunizations Due:      Topic Date Due   • COVID-19 Vaccine (1) Never done   • Influenza Vaccine (Season Ended) 09/01/2023     Advance Directives   What are advance directives? Advance directives are legal documents that state your wishes and plans for medical care  These plans are made ahead of time in case you lose your ability to make decisions for yourself  Advance directives can apply to any medical decision, such as the treatments you want, and if you want to donate organs  What are the types of advance directives? There are many types of advance directives, and each state has rules about how to use them  You may choose a combination of any of the following:  · Living will: This is a written record of the treatment you want  You can also choose which treatments you do not want, which to limit, and which to stop at a certain time   This includes surgery, medicine, IV fluid, and tube feedings  · Durable power of  for healthcare [facility]): This is a written record that states who you want to make healthcare choices for you when you are unable to make them for yourself  This person, called a proxy, is usually a family member or a friend  You may choose more than 1 proxy  · Do not resuscitate (DNR) order:  A DNR order is used in case your heart stops beating or you stop breathing  It is a request not to have certain forms of treatment, such as CPR  A DNR order may be included in other types of advance directives  · Medical directive: This covers the care that you want if you are in a coma, near death, or unable to make decisions for yourself  You can list the treatments you want for each condition  Treatment may include pain medicine, surgery, blood transfusions, dialysis, IV or tube feedings, and a ventilator (breathing machine)  · Values history: This document has questions about your views, beliefs, and how you feel and think about life  This information can help others choose the care that you would choose  Why are advance directives important? An advance directive helps you control your care  Although spoken wishes may be used, it is better to have your wishes written down  Spoken wishes can be misunderstood, or not followed  Treatments may be given even if you do not want them  An advance directive may make it easier for your family to make difficult choices about your care  Weight Management   Why it is important to manage your weight:  Being overweight increases your risk of health conditions such as heart disease, high blood pressure, type 2 diabetes, and certain types of cancer  It can also increase your risk for osteoarthritis, sleep apnea, and other respiratory problems  Aim for a slow, steady weight loss  Even a small amount of weight loss can lower your risk of health problems    How to lose weight safely:  A safe and healthy way to lose weight is to eat fewer calories and get regular exercise  You can lose up about 1 pound a week by decreasing the number of calories you eat by 500 calories each day  Healthy meal plan for weight management:  A healthy meal plan includes a variety of foods, contains fewer calories, and helps you stay healthy  A healthy meal plan includes the following:  · Eat whole-grain foods more often  A healthy meal plan should contain fiber  Fiber is the part of grains, fruits, and vegetables that is not broken down by your body  Whole-grain foods are healthy and provide extra fiber in your diet  Some examples of whole-grain foods are whole-wheat breads and pastas, oatmeal, brown rice, and bulgur  · Eat a variety of vegetables every day  Include dark, leafy greens such as spinach, kale, angi greens, and mustard greens  Eat yellow and orange vegetables such as carrots, sweet potatoes, and winter squash  · Eat a variety of fruits every day  Choose fresh or canned fruit (canned in its own juice or light syrup) instead of juice  Fruit juice has very little or no fiber  · Eat low-fat dairy foods  Drink fat-free (skim) milk or 1% milk  Eat fat-free yogurt and low-fat cottage cheese  Try low-fat cheeses such as mozzarella and other reduced-fat cheeses  · Choose meat and other protein foods that are low in fat  Choose beans or other legumes such as split peas or lentils  Choose fish, skinless poultry (chicken or turkey), or lean cuts of red meat (beef or pork)  Before you cook meat or poultry, cut off any visible fat  · Use less fat and oil  Try baking foods instead of frying them  Add less fat, such as margarine, sour cream, regular salad dressing and mayonnaise to foods  Eat fewer high-fat foods  Some examples of high-fat foods include french fries, doughnuts, ice cream, and cakes  · Eat fewer sweets  Limit foods and drinks that are high in sugar   This includes candy, cookies, regular soda, and sweetened drinks  Exercise:  Exercise at least 30 minutes per day on most days of the week  Some examples of exercise include walking, biking, dancing, and swimming  You can also fit in more physical activity by taking the stairs instead of the elevator or parking farther away from stores  Ask your healthcare provider about the best exercise plan for you  © Copyright [address] 2018 Information is for End User's use only and may not be sold, redistributed or otherwise used for commercial purposes   All illustrations and images included in CareNotes® are the copyrighted property of A D A M , [employer]  or [address] [place] [address]

## 2023-06-17 ENCOUNTER — HOSPITAL ENCOUNTER (OUTPATIENT)
Dept: MRI IMAGING | Facility: HOSPITAL | Age: 63
Discharge: HOME | End: 2023-06-17
Attending: ORTHOPAEDIC SURGERY
Payer: MEDICARE

## 2023-06-17 DIAGNOSIS — M23.92 INTERNAL DERANGEMENT OF LEFT KNEE: ICD-10-CM

## 2023-06-17 PROCEDURE — 73721 MRI JNT OF LWR EXTRE W/O DYE: CPT

## 2023-06-17 PROCEDURE — G1004 CDSM NDSC: HCPCS

## 2023-06-19 DIAGNOSIS — G40.909 SEIZURE DISORDER (HCC): ICD-10-CM

## 2023-06-20 RX ORDER — PHENYTOIN SODIUM 100 MG/1
CAPSULE, EXTENDED RELEASE ORAL
Qty: 270 CAPSULE | Refills: 3 | Status: SHIPPED | OUTPATIENT
Start: 2023-06-20

## 2023-06-26 ENCOUNTER — OFFICE VISIT (OUTPATIENT)
Dept: OBGYN CLINIC | Facility: MEDICAL CENTER | Age: 63
End: 2023-06-26
Payer: MEDICARE

## 2023-06-26 VITALS
HEIGHT: 67 IN | WEIGHT: 244 LBS | HEART RATE: 66 BPM | BODY MASS INDEX: 38.3 KG/M2 | SYSTOLIC BLOOD PRESSURE: 135 MMHG | DIASTOLIC BLOOD PRESSURE: 85 MMHG

## 2023-06-26 DIAGNOSIS — S83.242A OTHER TEAR OF MEDIAL MENISCUS, CURRENT INJURY, LEFT KNEE, INITIAL ENCOUNTER: Primary | ICD-10-CM

## 2023-06-26 DIAGNOSIS — Z01.818 PRE-OP TESTING: ICD-10-CM

## 2023-06-26 PROCEDURE — 99214 OFFICE O/P EST MOD 30 MIN: CPT | Performed by: ORTHOPAEDIC SURGERY

## 2023-06-26 RX ORDER — ACETAMINOPHEN 325 MG/1
650 TABLET ORAL EVERY 6 HOURS PRN
OUTPATIENT
Start: 2023-06-26

## 2023-06-26 RX ORDER — CEFAZOLIN SODIUM 2 G/50ML
2000 SOLUTION INTRAVENOUS ONCE
OUTPATIENT
Start: 2023-06-26 | End: 2023-06-26

## 2023-06-26 RX ORDER — TRAMADOL HYDROCHLORIDE 50 MG/1
50 TABLET ORAL EVERY 6 HOURS PRN
OUTPATIENT
Start: 2023-06-26

## 2023-06-26 NOTE — PROGRESS NOTES
Ortho Sports Medicine Knee Visit     Assesment:   left knee medial meniscus tear body and posterior horn, moderate patella arthritis     Plan:    Conservative treatment:    Ice to knee for 20 minutes at least 1-2 times daily  One kidney stays away from nsaids, recommend tylenol and ice  Topical creams for pain  MRI reviewed showing medial meniscal tear  Patient continues with daily mechanical pain/symptoms, instability  It was decided at this time to proceed with arthroscopic partial medial meniscectomy  Consented today  See back post operatively  Imaging: All imaging from today was reviewed by myself and explained to the patient  Injection:    No Injection planned at this time  Surgery: All of the risks and benefits of operative treatment were explained to the patient, as well as the risks and benefits of any alternative treatment options, including nonoperative care  The risks of surgical treatement include, but are not limited to, infection, bleeding, blood clot, neurovascular damage, need for further surgery, continued pain, cardiovascular risk, and anesthesia risk  The patient understood this and elects to proceed forward with surgical intervention  We will proceed forward with surgical arthroscopy of the left knee with menisectomy of medial and possible lateral meniscus    Patient Denies history of blood clot  Patient denies personal or family history of bleeding or clotting disorder  Patient does not take any blood thinners  Patient denies cardiac history including none  Patient has  high blood pressure  Patient does not  see a cardiologist  Patient Denies  current history of diabetes  Patient has allergies to antibiotics, Ciprofloxacin   Patient denies history of MRSA infection  Patient Denies current tobacco use  Discussed with patient use of narcotics for post-op pain  Patient denies history of opioid abuse  Medical clearance will be obtained   Bloodwork will be obtained  EKG will be obtained  CC: Left knee pain    History of Present Illness: The patient is returns for follow up of his left knee  He is here to go over MRI  Since the prior visit, He reports no improvement  Pain is located medial       Pain medial based and sharp and acute in nature  Difficulty sleeping due to pain  Keeps knee extended for comfort, flexing knee is painful  Pain is improved by rest   Pain is aggravated by stairs, squatting, weight bearing, walking and pivoting on a planted foot  Symptoms include clicking and catching  The patient has tried rest, ice , CSI and NSAIDS  I have reviewed the past medical, surgical, social and family history, medications and allergies as documented in the EMR  Review of systems: ROS is negative other than that noted in the HPI  Constitutional: Negative for fatigue and fever     Cardiovascular: Negative for chest pain  Pulmonary: negative for shortness of breath    PMH/PSH:  Past Medical History:   Diagnosis Date   • Accelerated essential hypertension    • Acquired spondylolisthesis    • Atypical chest pain    • CA of prostate (HCC)    • CPAP (continuous positive airway pressure) dependence    • De Quervain's tenosynovitis    • Epileptic seizure ([facility] [address] )    • Esophageal reflux    • Fatigue    • Hematuria    • Hyperglycemia     last assessed: 3/28/2013   • Hyperlipidemia    • Impaired fasting glucose    • Lightheadedness    • Lower back pain    • Lumbar foraminal stenosis    • Lumbar spondylosis    • Malignant melanoma of skin (HCC)    • Microscopic hematuria     last assessed: 6/16/2014   • Obesity    • Plantar fasciitis    • Polyp of sigmoid colon    • PONV (postoperative nausea and vomiting)    • Prostate cancer ([address] )     recevied radiation 5/2022 - 9/2022   • Renal cell carcinoma, right (HCC)    • Right kidney mass    • Sleep apnea    • Spinal stenosis    • Status post medial meniscectomy of right knee 07/21/2021     Past Surgical "History:   Procedure Laterality Date   • CHOLECYSTECTOMY     • HERNIA REPAIR     • NEPHRECTOMY Left    • ND ARTHRS KNE SURG W/MENISCECTOMY MED/LAT W/SHVG Right 5/27/2020    Procedure: MEDIAL Menisectomy Right knee;  Surgeon: Sera Torres DO;  Location:  MAIN OR;  Service: Orthopedics   • PROSTATECTOMY          Physical Exam:    Blood pressure 135/85, pulse 66, height 5' 7\" (1 702 m), weight 111 kg (244 lb)  General/Constitutional: NAD, well developed, well nourished  HENT: Normocephalic, atraumatic  CV: Intact distal pulses, regular rate  Resp: No respiratory distress or labored breathing  Lymphatic: No lymphadenopathy palpated  Neuro: Alert and Oriented x 3, no focal deficits  Psych: Normal mood, normal affect, normal judgement, normal behavior  Skin: Warm, dry, no rashes, no erythema       Knee Exam (focused): RIGHT LEFT   ROM:   0-130 0-130   Palpation: Effusion negative negative     MJL tenderness Negative Positive     LJL tenderness Negative Negative   Instability: Varus stable stable     Valgus stable Stable with pain medial   Special Tests: Lachman Negative Negative     Posterior drawer Negative Negative     Anterior drawer Negative Negative     Pivot shift not tested not tested     Dial not tested not tested   Patella: Palpation no tenderness no tenderness     Mobility 1/4 1/4     Apprehension Negative Negative   Other: blaise  not tested Positive       LE NV Exam: +2 DP/PT pulses bilaterally  Sensation intact to light touch L2-S1 bilaterally    No calf tenderness to palpation bilaterally      Knee Imaging      MRI of the left knee were reviewed, which demonstrate horizontal tear through medial meniscus body to posterior horn and anterior horn, grade 1 MCL sprain, moderate patellar cartilage loss  I have reviewed the radiology report and agree with their impression      Scribe Attestation    I,:  Rashida Parker am acting as a scribe while in the presence of the attending " physician :       I,:  Shellie Mtz DO personally performed the services described in this documentation    as scribed in my presence :

## 2023-07-12 ENCOUNTER — APPOINTMENT (OUTPATIENT)
Dept: PREADMISSION TESTING | Facility: HOSPITAL | Age: 63
End: 2023-07-12
Payer: MEDICARE

## 2023-07-18 ENCOUNTER — APPOINTMENT (OUTPATIENT)
Dept: LAB | Facility: HOSPITAL | Age: 63
End: 2023-07-18
Payer: MEDICARE

## 2023-07-18 ENCOUNTER — LAB (OUTPATIENT)
Dept: LAB | Facility: HOSPITAL | Age: 63
End: 2023-07-18
Payer: MEDICARE

## 2023-07-18 DIAGNOSIS — S83.242A OTHER TEAR OF MEDIAL MENISCUS, CURRENT INJURY, LEFT KNEE, INITIAL ENCOUNTER: ICD-10-CM

## 2023-07-18 DIAGNOSIS — Z01.818 PRE-OP TESTING: ICD-10-CM

## 2023-07-18 LAB
ANION GAP SERPL CALCULATED.3IONS-SCNC: 8 MMOL/L
BUN SERPL-MCNC: 19 MG/DL (ref 5–25)
CALCIUM SERPL-MCNC: 9.2 MG/DL (ref 8.4–10.2)
CHLORIDE SERPL-SCNC: 110 MMOL/L (ref 96–108)
CO2 SERPL-SCNC: 23 MMOL/L (ref 21–32)
CREAT SERPL-MCNC: 1.31 MG/DL (ref 0.6–1.3)
ERYTHROCYTE [DISTWIDTH] IN BLOOD BY AUTOMATED COUNT: 13.5 % (ref 11.6–15.1)
GFR SERPL CREATININE-BSD FRML MDRD: 56 ML/MIN/1.73SQ M
GLUCOSE P FAST SERPL-MCNC: 103 MG/DL (ref 65–99)
HCT VFR BLD AUTO: 43.7 % (ref 36.5–49.3)
HGB BLD-MCNC: 14.6 G/DL (ref 12–17)
MCH RBC QN AUTO: 29.7 PG (ref 26.8–34.3)
MCHC RBC AUTO-ENTMCNC: 33.4 G/DL (ref 31.4–37.4)
MCV RBC AUTO: 89 FL (ref 82–98)
PLATELET # BLD AUTO: 274 THOUSANDS/UL (ref 149–390)
PMV BLD AUTO: 9.3 FL (ref 8.9–12.7)
POTASSIUM SERPL-SCNC: 4.1 MMOL/L (ref 3.5–5.3)
RBC # BLD AUTO: 4.91 MILLION/UL (ref 3.88–5.62)
SODIUM SERPL-SCNC: 141 MMOL/L (ref 135–147)
WBC # BLD AUTO: 8.13 THOUSAND/UL (ref 4.31–10.16)

## 2023-07-18 PROCEDURE — 85027 COMPLETE CBC AUTOMATED: CPT

## 2023-07-18 PROCEDURE — 93005 ELECTROCARDIOGRAM TRACING: CPT

## 2023-07-18 PROCEDURE — 80048 BASIC METABOLIC PNL TOTAL CA: CPT

## 2023-07-18 PROCEDURE — 36415 COLL VENOUS BLD VENIPUNCTURE: CPT

## 2023-07-19 ENCOUNTER — EVALUATION (OUTPATIENT)
Dept: PHYSICAL THERAPY | Facility: CLINIC | Age: 63
End: 2023-07-19
Payer: MEDICARE

## 2023-07-19 ENCOUNTER — TELEPHONE (OUTPATIENT)
Dept: OBGYN CLINIC | Facility: MEDICAL CENTER | Age: 63
End: 2023-07-19

## 2023-07-19 DIAGNOSIS — S83.242A OTHER TEAR OF MEDIAL MENISCUS OF LEFT KNEE AS CURRENT INJURY, INITIAL ENCOUNTER: Primary | ICD-10-CM

## 2023-07-19 DIAGNOSIS — S83.242A OTHER TEAR OF MEDIAL MENISCUS, CURRENT INJURY, LEFT KNEE, INITIAL ENCOUNTER: ICD-10-CM

## 2023-07-19 PROCEDURE — 97110 THERAPEUTIC EXERCISES: CPT | Performed by: PHYSICAL THERAPIST

## 2023-07-19 PROCEDURE — 97162 PT EVAL MOD COMPLEX 30 MIN: CPT | Performed by: PHYSICAL THERAPIST

## 2023-07-19 RX ORDER — ACETAMINOPHEN 500 MG
1000 TABLET ORAL EVERY 6 HOURS PRN
COMMUNITY

## 2023-07-19 NOTE — PRE-PROCEDURE INSTRUCTIONS
Pre-Surgery Instructions:   Medication Instructions   • acetaminophen (TYLENOL) 500 mg tablet Uses PRN- OK to take day of surgery   • amLODIPine (NORVASC) 10 mg tablet Take day of surgery. • Cholecalciferol (Vitamin D3) 125 MCG (5000 UT) TABS Hold day of surgery. • phenytoin (DILANTIN) 100 mg ER capsule Take day of surgery. See above    . Medication instructions for day surgery reviewed. Please use only a sip of water to take your instructed medications. Avoid all over the counter vitamins, supplements and NSAIDS for one week prior to surgery per anesthesia guidelines. Tylenol is ok to take as needed. You will receive a call one business day prior to surgery with an arrival time and hospital directions. If your surgery is scheduled on a Monday, the hospital will be calling you on the Friday prior to your surgery. If you have not heard from anyone by 8pm, please call the hospital supervisor through the hospital  at [phone]. Ana Moksana [place]aya [phone]). Do not eat or drink anything after midnight the night before your surgery, including candy, mints, lifesavers, or chewing gum. Do not drink alcohol 24hrs before your surgery. Try not to smoke at least 24hrs before your surgery. Follow the pre surgery showering instructions as listed in the [facility] Surgical Experience Booklet” or otherwise provided by your surgeon's office. Do not shave the surgical area 24 hours before surgery. Do not apply any lotions, creams, including makeup, cologne, deodorant, or perfumes after showering on the day of your surgery. No contact lenses, eye make-up, or artificial eyelashes. Remove nail polish, including gel polish, and any artificial, gel, or acrylic nails if possible. Remove all jewelry including rings and body piercing jewelry. Wear causal clothing that is easy to take on and off. Consider your type of surgery. Keep any valuables, jewelry, piercings at home.  Please bring any specially ordered equipment (sling, braces) if indicated. Arrange for a responsible person to drive you to and from the hospital on the day of your surgery. Visitor Guidelines discussed. Call the surgeon's office with any new illnesses, exposures, or additional questions prior to surgery. Please reference your [facility] Surgical Experience Booklet” for additional information to prepare for your upcoming surgery.

## 2023-07-19 NOTE — PROGRESS NOTES
PT Evaluation     Today's date: 2023  Patient name: Sheron Garnett  : 1960  MRN: [id number]  Referring provider: Rocio Roca,*  Dx:   Encounter Diagnosis     ICD-10-CM    1. Other tear of medial meniscus of left knee as current injury, initial encounter  S83.242A                Assessment  Assessment details: Sheron Garnett is a 70 y.o. male who presents with pain, decreased strength, decreased ROM, decreased joint mobility, ambulatory dysfunction and balance dysfunction. Due to these impairments, patient has difficulty performing ADL's, recreational activities, engaging in social activities, stair negotiation, lifting/carrying, transfers. Patient's clinical presentation is consistent with their referring diagnosis of Left medial meniscal tear (pre op PT visit). Patient has been educated in post-op contraindications / precautions, gait training, weight bearing status, home exercise program and plan of care. Patient would benefit from skilled physical therapy services to address their aforementioned functional limitations and progress towards prior level of function and independence with home exercise program.     Impairments: abnormal gait, abnormal or restricted ROM, activity intolerance, impaired balance, impaired physical strength, lacks appropriate home exercise program, pain with function, weight-bearing intolerance, poor posture  and poor body mechanics  Functional limitations: walk/stand, STS, stair negotiation, squat, in/out bed/car   Prognosis: fair  Prognosis details: + factors: high motivation levels  - factors: BMI, age, chronic pain, History of R menisectomy    Goals  Short Term Goals to be accomplished by 23: (1 month post op)  STG1: Pt will be I with HEP  STG2: Pt will demo 0-120 degrees in knee AROM to improve gait. STG3: Pt will demo 5/5 MMT strength in knee to improve stair negotiation. STG4: Pt will amb community distance without gait deviates due to pain.     Long Term Goals to be accomplished 10/11/23:  1. Pt will be able to perform STS without UE support with normal mechanics. 2. Pt will be able to go up/down stairs without hand rail and with reciprocal gait pattern. 3. Pt will be able to walk/stand for >2 hour to do house tasks and yard work without pain and normal mechanics. Plan  Plan details: HEP development, stretching, strengthening, A/AA/PROM, joint mobilizations, posture education, STM/MI as needed to reduce muscle tension, muscle reeducation, PLOC discussed and agreed upon with patient. Patient would benefit from: PT eval and skilled physical therapy  Planned modality interventions: cryotherapy, thermotherapy: hydrocollator packs and unattended electrical stimulation  Planned therapy interventions: manual therapy, neuromuscular re-education, self care, therapeutic activities, therapeutic exercise, home exercise program, patient education, joint mobilization, balance, strengthening, stretching and therapeutic training  Frequency: 2x week  Duration in weeks: 12  Plan of Care beginning date: 2023  Plan of Care expiration date: 10/11/2023  Treatment plan discussed with: patient    Subjective Evaluation    History of Present Illness  Mechanism of injury: Pt is a 71 y/o male who presents to PT for pre op  ARTHROSCOPIC MENISCECTOMY LATERAL /MEDIAL (Left: Knee) to be performed on 23. Patient has been seeing Dr. Carolyn Donald since December. Has not had relief except of all non weight bearing. He received prescription for PT.      Patient Goals  Patient goals for therapy: increased motion, improved balance, decreased pain, decreased edema, increased strength and independence with ADLs/IADLs  Patient goal: walk/stand, STS, stair negotiation, squat, in/out bed/car  Pain  Current pain ratin  At best pain ratin  At worst pain ratin  Location: Left knee   Quality: tight, pulling and discomfort  Relieving factors: relaxation and rest  Aggravating factors: stair climbing, walking, standing, lifting and sitting    Treatments  Current treatment: physical therapy    Objective     Observations   Left Knee   Positive for edema. Tenderness   Left Knee   Tenderness in the ITB and pes anserinus. Active Range of Motion   Left Knee   Flexion: 100 degrees with pain  Extension: 10 degrees with pain    Right Knee   Flexion: WFL  Extension: WFL    Passive Range of Motion   Left Knee   Flexion: 110 degrees with pain  Extension: 0 degrees with pain    Right Knee   Flexion: WFL  Extension: WFL    Strength/Myotome Testing     Left Knee   Flexion: 4-  Extension: 4-    Right Knee   Flexion: 5  Extension: 5    Additional Strength Details  U/l heel raise: NOT TESTED   R: reps  L: reps     Tests     Left Knee   Negative valgus stress test at 30 degrees and varus stress test at 30 degrees.      Additional Tests Details  SLS: NOT TESTED  R: sec  L: sec       Precautions:   Past Medical History:   Diagnosis Date   • Accelerated essential hypertension    • Acquired spondylolisthesis    • Atypical chest pain    • CA of prostate (HCC)    • CPAP (continuous positive airway pressure) dependence    • De Quervain's tenosynovitis    • Epileptic seizure ([address])    • Esophageal reflux    • Fatigue    • Hematuria    • Hyperglycemia     last assessed: 3/28/2013   • Hyperlipidemia    • Impaired fasting glucose    • Lightheadedness    • Lower back pain    • Lumbar foraminal stenosis    • Lumbar spondylosis    • Malignant melanoma of skin (HCC)    • Microscopic hematuria     last assessed: 6/16/2014   • Obesity    • Plantar fasciitis    • Polyp of sigmoid colon    • PONV (postoperative nausea and vomiting)    • Prostate cancer ([address])     recevied radiation 5/2022 - 9/2022   • Renal cell carcinoma, right (HCC)    • Right kidney mass    • Sleep apnea    • Spinal stenosis    • Status post medial meniscectomy of right knee 07/21/2021       Manuals 7/19        STM ITB, HS, calf, adductors         PROM stretch         Pat mobs                   Neuro Re-Ed 7/19                                                                       Ther Ex 7/19        SLR 2x10 ea        S/l hip abd 2x10 ea        Seated HS stretch :30x2        Bridge         Calf stretch step                                    Ther Activity 7/19

## 2023-07-21 ENCOUNTER — TELEPHONE (OUTPATIENT)
Dept: OBGYN CLINIC | Facility: MEDICAL CENTER | Age: 63
End: 2023-07-21

## 2023-07-21 ENCOUNTER — OFFICE VISIT (OUTPATIENT)
Dept: FAMILY MEDICINE CLINIC | Facility: CLINIC | Age: 63
End: 2023-07-21
Payer: MEDICARE

## 2023-07-21 VITALS
TEMPERATURE: 97.5 F | WEIGHT: 245 LBS | BODY MASS INDEX: 38.45 KG/M2 | HEART RATE: 71 BPM | SYSTOLIC BLOOD PRESSURE: 130 MMHG | OXYGEN SATURATION: 94 % | DIASTOLIC BLOOD PRESSURE: 88 MMHG | HEIGHT: 67 IN

## 2023-07-21 DIAGNOSIS — S83.242A TEAR OF MEDIAL MENISCUS OF LEFT KNEE, CURRENT, UNSPECIFIED TEAR TYPE, INITIAL ENCOUNTER: ICD-10-CM

## 2023-07-21 DIAGNOSIS — N18.31 STAGE 3A CHRONIC KIDNEY DISEASE (HCC): ICD-10-CM

## 2023-07-21 DIAGNOSIS — Z85.46 HISTORY OF PROSTATE CANCER: ICD-10-CM

## 2023-07-21 DIAGNOSIS — I10 BENIGN ESSENTIAL HYPERTENSION: ICD-10-CM

## 2023-07-21 DIAGNOSIS — G47.33 SEVERE OBSTRUCTIVE SLEEP APNEA: ICD-10-CM

## 2023-07-21 DIAGNOSIS — E66.01 CLASS 2 SEVERE OBESITY WITH SERIOUS COMORBIDITY AND BODY MASS INDEX (BMI) OF 38.0 TO 38.9 IN ADULT, UNSPECIFIED OBESITY TYPE (HCC): ICD-10-CM

## 2023-07-21 DIAGNOSIS — G40.509 NONINTRACTABLE EPILEPSY DUE TO EXTERNAL CAUSES, WITHOUT STATUS EPILEPTICUS (HCC): ICD-10-CM

## 2023-07-21 DIAGNOSIS — C64.1 RENAL CELL CARCINOMA, RIGHT (HCC): ICD-10-CM

## 2023-07-21 DIAGNOSIS — Z01.818 PRE-OPERATIVE CLEARANCE: Primary | ICD-10-CM

## 2023-07-21 PROCEDURE — 99214 OFFICE O/P EST MOD 30 MIN: CPT | Performed by: NURSE PRACTITIONER

## 2023-07-21 NOTE — ASSESSMENT & PLAN NOTE
S/p prostatectomy 2016   PSA slowly elevated and completed radiation 2022  Follows regularly with urology

## 2023-07-21 NOTE — TELEPHONE ENCOUNTER
LVM asking Pt if he'd be ok with moving his first post-op appt from 7/31 to 8/2 with Yolanda Golden PA-C. Gave Pt phone number to call back.

## 2023-07-21 NOTE — ASSESSMENT & PLAN NOTE
Lab Results   Component Value Date    EGFR 56 07/18/2023    EGFR 50 06/04/2020    CREATININE 1.31 (H) 07/18/2023    CREATININE 1.22 01/13/2021    CREATININE 1.50 (H) 06/04/2020   s/p right nephrectomy in 2015  mildly elevated on exam this week, suspect related to dehydration and dietary changes over vacation, eGFR remains stable.   Advised to avoid nephrotoxic medications- he avoids NSAIDs  Maintain healthy blood pressure  We will continue to monitor this routinely

## 2023-07-21 NOTE — ASSESSMENT & PLAN NOTE
Last seizure 1980- suspect baseball injury with getting hit in head with ball. Stable with dilantin three times daily.    Advised to take dilantin morning of surgery with small sip of water

## 2023-07-21 NOTE — PROGRESS NOTES
Name: Henny Allan      : 1960      MRN: [id number]  Encounter Provider: Haskell Nissen, CRNP  Encounter Date: 2023   Encounter department: [facility] [address]     1. Pre-operative clearance  Assessment & Plan:  Raji Martin is cleared for left knee arthroscopy with Dr Guru Alonso on 23  Labs and ekg reviewed- medically cleared  Advised to take dilantin and amlodipine morning of surgery with small sip of water  Avoid nephrotoxic drugs given right nephrectomy and slightly elevated creatinine  Adequate IV hydration during anesthesia to assist  No contraindications to proposed surgery      2. Tear of medial meniscus of left knee, current, unspecified tear type, initial encounter  Assessment & Plan:  Raji Martin is cleared for left knee arthroscopy with Dr Guru Alonso on 23  Labs and ekg reviewed- medically cleared  Advised to take dilantin and amlodipine morning of surgery with small sip of water  Avoid nephrotoxic drugs given right nephrectomy and slightly elevated creatinine  Adequate IV hydration during anesthesia to assist  No contraindications to proposed surgery      3. 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 Stable with dilantin three times daily. Advised to take dilantin morning of surgery with small sip of water      4. Severe obstructive sleep apnea  Assessment & Plan:  Stable, compliant with CPAP nightly         5. Renal cell carcinoma, right [facility])  Assessment & Plan:  S/p nephrectomy of right kidney       6. Benign essential hypertension  Assessment & Plan:  BP stable, continue amlodipine 10mg once daily      7.  Stage 3a chronic kidney disease [facility])  Assessment & Plan:  Lab Results   Component Value Date    EGFR 56 2023    EGFR 50 2020    CREATININE 1.31 (H) 2023    CREATININE 1.22 2021 CREATININE 1.50 (H) 06/04/2020   s/p right nephrectomy in 2015  mildly elevated on exam this week, suspect related to dehydration and dietary changes over vacation, eGFR remains stable. Advised to avoid nephrotoxic medications- he avoids NSAIDs  Maintain healthy blood pressure  We will continue to monitor this routinely      8. History of prostate cancer  Assessment & Plan:  S/p prostatectomy 2016   PSA slowly elevated and completed radiation 2022  Follows regularly with urology           9. Class 2 severe obesity with serious comorbidity and body mass index (BMI) of 38.0 to 38.9 in adult, unspecified obesity type [facility])  Assessment & Plan:  Encouraged healthy dietary choices  Increase physical activity once able too after surgery      BMI Counseling: Body mass index is 38.37 kg/m². The BMI is above normal. Nutrition recommendations include decreasing portion sizes, encouraging healthy choices of fruits and vegetables, limiting drinks that contain sugar, moderation in carbohydrate intake and reducing intake of cholesterol. Exercise recommendations include exercising 3-5 times per week and strength training exercises. Rationale for BMI follow-up plan is due to patient being overweight or obese. Depression Screening and Follow-up Plan: Patient was screened for depression during today's encounter. They screened negative with a PHQ-2 score of 0. Subjective        Here today for pre operatively clearance. Have left knee arthroscopy on 7/25/23 with Dr Angeli Gudino at [facility]. 2 months ago [place] 2 days later knee pain flared, swelling, hurt to put pressure onto the knee. Felt unstable with walking. Pain is 7 out of 10. Had 1x occurrence of nausea and vomiting post operatively after right kidney nephrectomy. Otherwise usually tolerates anesthesia fine.       Mildly elevated creatinine at baseline, s/p right nephrectomy secondary to cancer    No chest pain, no shortness of breath, no palpitations    Hx of seizures, no seizure since 1980- remains on dilantin TID             Review of Systems   Constitutional: Negative for activity change, appetite change, chills, diaphoresis, fatigue and unexpected weight change. HENT: Negative for congestion, ear discharge, ear pain, hearing loss, nosebleeds and rhinorrhea. Eyes: Negative for pain, redness, itching and visual disturbance. Respiratory: Negative for cough, choking, chest tightness and shortness of breath. Cardiovascular: Negative for chest pain and leg swelling. Gastrointestinal: Negative for abdominal pain, blood in stool, constipation, diarrhea and nausea. Endocrine: Negative for cold intolerance, polydipsia and polyphagia. Genitourinary: Positive for frequency. Negative for dysuria, hematuria and urgency. Musculoskeletal: Positive for arthralgias and joint swelling. Negative for back pain, gait problem, neck pain and neck stiffness. Skin: Negative for color change and rash. Allergic/Immunologic: Negative for environmental allergies and food allergies. Neurological: Negative for dizziness, tremors, seizures, speech difficulty, numbness and headaches. Hematological: Negative for adenopathy. Does not bruise/bleed easily. Psychiatric/Behavioral: Negative for behavioral problems, dysphoric mood, hallucinations and self-injury. Current Outpatient Medications on File Prior to Visit   Medication Sig   • acetaminophen (TYLENOL) 500 mg tablet Take 1,000 mg by mouth every 6 (six) hours as needed for mild pain   • amLODIPine (NORVASC) 10 mg tablet TAKE 1 TABLET BY MOUTH EVERY DAY   • Cholecalciferol (Vitamin D3) 125 MCG (5000 UT) TABS Take 5,000 Units by mouth daily   • LORazepam (Ativan) 0.5 mg tablet Take 1 tab 1 hour prior to trip.   May repeat in 4 hours if needed   • phenytoin (DILANTIN) 100 mg ER capsule TAKE 1 CAPSULE BY MOUTH THREE TIMES A DAY   • scopolamine (TRANSDERM-SCOP) 1 mg/3 days TD 72 hr patch Place 1 patch on the skin over 72 hours every third day   • [DISCONTINUED] atenolol (TENORMIN) 50 mg tablet Take 1 tablet (50 mg total) by mouth daily (Patient not taking: Reported on 12/14/2022)       Objective     /88   Pulse 71   Temp 97.5 °F (36.4 °C)   Ht 5' 7" (1.702 m)   Wt 111 kg (245 lb)   SpO2 94%   BMI 38.37 kg/m²     Physical Exam  Vitals and nursing note reviewed. Constitutional:       General: He is not in acute distress. Appearance: Normal appearance. He is well-developed. He is obese. He is not diaphoretic. HENT:      Head: Normocephalic and atraumatic. Right Ear: External ear normal.      Left Ear: External ear normal.      Nose: Nose normal.      Mouth/Throat:      Mouth: Mucous membranes are moist.      Pharynx: Oropharynx is clear. No oropharyngeal exudate. Eyes:      General: No scleral icterus. Right eye: No discharge. Left eye: No discharge. Conjunctiva/sclera: Conjunctivae normal.      Pupils: Pupils are equal, round, and reactive to light. Neck:      Thyroid: No thyromegaly. Cardiovascular:      Rate and Rhythm: Normal rate and regular rhythm. Heart sounds: Normal heart sounds. No murmur heard. Pulmonary:      Effort: Pulmonary effort is normal.      Breath sounds: Normal breath sounds. No wheezing or rales. Abdominal:      General: Bowel sounds are normal.      Palpations: Abdomen is soft. There is no mass. Tenderness: There is no abdominal tenderness. There is no guarding. Musculoskeletal:         General: Tenderness (left knee) present. Normal range of motion. Cervical back: Normal range of motion and neck supple. Lymphadenopathy:      Cervical: No cervical adenopathy. Skin:     General: Skin is warm and dry. Neurological:      Mental Status: He is alert and oriented to person, place, and time. Deep Tendon Reflexes: Reflexes are normal and symmetric.    Psychiatric:         Mood and Affect: Mood normal.         Behavior: Behavior normal.         Thought Content:  Thought content normal.         Judgment: Judgment normal.       PACO Polanco

## 2023-07-21 NOTE — ASSESSMENT & PLAN NOTE
Murtis Flattery is cleared for left knee arthroscopy with Dr Marcela Dunaway on 7/25/23  Labs and ekg reviewed- medically cleared  Advised to take dilantin and amlodipine morning of surgery with small sip of water  Avoid nephrotoxic drugs given right nephrectomy and slightly elevated creatinine  Adequate IV hydration during anesthesia to assist  No contraindications to proposed surgery

## 2023-07-22 DIAGNOSIS — I10 BENIGN ESSENTIAL HYPERTENSION: ICD-10-CM

## 2023-07-22 LAB
ATRIAL RATE: 71 BPM
P AXIS: 7 DEGREES
PR INTERVAL: 144 MS
QRS AXIS: -2 DEGREES
QRSD INTERVAL: 88 MS
QT INTERVAL: 358 MS
QTC INTERVAL: 389 MS
T WAVE AXIS: 15 DEGREES
VENTRICULAR RATE: 71 BPM

## 2023-07-22 PROCEDURE — 93010 ELECTROCARDIOGRAM REPORT: CPT | Performed by: INTERNAL MEDICINE

## 2023-07-22 RX ORDER — AMLODIPINE BESYLATE 10 MG/1
TABLET ORAL
Qty: 30 TABLET | Refills: 8 | Status: SHIPPED | OUTPATIENT
Start: 2023-07-22

## 2023-07-25 ENCOUNTER — HOSPITAL ENCOUNTER (OUTPATIENT)
Facility: HOSPITAL | Age: 63
Setting detail: OUTPATIENT SURGERY
Discharge: HOME | End: 2023-07-25
Attending: ORTHOPAEDIC SURGERY | Admitting: ORTHOPAEDIC SURGERY
Payer: MEDICARE

## 2023-07-25 ENCOUNTER — ANESTHESIA EVENT (OUTPATIENT)
Dept: PERIOP | Facility: HOSPITAL | Age: 63
End: 2023-07-25
Payer: MEDICARE

## 2023-07-25 ENCOUNTER — ANESTHESIA (OUTPATIENT)
Dept: PERIOP | Facility: HOSPITAL | Age: 63
End: 2023-07-25
Payer: MEDICARE

## 2023-07-25 VITALS
BODY MASS INDEX: 38.45 KG/M2 | TEMPERATURE: 96.8 F | RESPIRATION RATE: 16 BRPM | OXYGEN SATURATION: 97 % | HEART RATE: 54 BPM | WEIGHT: 245 LBS | DIASTOLIC BLOOD PRESSURE: 67 MMHG | SYSTOLIC BLOOD PRESSURE: 134 MMHG | HEIGHT: 67 IN

## 2023-07-25 DIAGNOSIS — Z98.890 S/P ARTHROSCOPIC PARTIAL MEDIAL MENISCECTOMY: Primary | ICD-10-CM

## 2023-07-25 PROCEDURE — 29881 ARTHRS KNE SRG MNISECTMY M/L: CPT | Performed by: PHYSICIAN ASSISTANT

## 2023-07-25 PROCEDURE — 29881 ARTHRS KNE SRG MNISECTMY M/L: CPT | Performed by: ORTHOPAEDIC SURGERY

## 2023-07-25 RX ORDER — ACETAMINOPHEN 325 MG/1
650 TABLET ORAL EVERY 6 HOURS PRN
Status: DISCONTINUED | OUTPATIENT
Start: 2023-07-25 | End: 2023-07-25 | Stop reason: HOSPADM

## 2023-07-25 RX ORDER — SODIUM CHLORIDE, SODIUM LACTATE, POTASSIUM CHLORIDE, CALCIUM CHLORIDE 600; 310; 30; 20 MG/100ML; MG/100ML; MG/100ML; MG/100ML
100 INJECTION, SOLUTION INTRAVENOUS CONTINUOUS
Status: DISCONTINUED | OUTPATIENT
Start: 2023-07-25 | End: 2023-07-25 | Stop reason: HOSPADM

## 2023-07-25 RX ORDER — CEFAZOLIN SODIUM 2 G/50ML
2000 SOLUTION INTRAVENOUS ONCE
Status: COMPLETED | OUTPATIENT
Start: 2023-07-25 | End: 2023-07-25

## 2023-07-25 RX ORDER — FENTANYL CITRATE/PF 50 MCG/ML
25 SYRINGE (ML) INJECTION
Status: DISCONTINUED | OUTPATIENT
Start: 2023-07-25 | End: 2023-07-25 | Stop reason: HOSPADM

## 2023-07-25 RX ORDER — HYDROMORPHONE HCL/PF 1 MG/ML
0.5 SYRINGE (ML) INJECTION
Status: DISCONTINUED | OUTPATIENT
Start: 2023-07-25 | End: 2023-07-25 | Stop reason: HOSPADM

## 2023-07-25 RX ORDER — LIDOCAINE HYDROCHLORIDE 10 MG/ML
INJECTION, SOLUTION EPIDURAL; INFILTRATION; INTRACAUDAL; PERINEURAL AS NEEDED
Status: DISCONTINUED | OUTPATIENT
Start: 2023-07-25 | End: 2023-07-25

## 2023-07-25 RX ORDER — PROPOFOL 10 MG/ML
INJECTION, EMULSION INTRAVENOUS AS NEEDED
Status: DISCONTINUED | OUTPATIENT
Start: 2023-07-25 | End: 2023-07-25

## 2023-07-25 RX ORDER — MIDAZOLAM HYDROCHLORIDE 2 MG/2ML
INJECTION, SOLUTION INTRAMUSCULAR; INTRAVENOUS AS NEEDED
Status: DISCONTINUED | OUTPATIENT
Start: 2023-07-25 | End: 2023-07-25

## 2023-07-25 RX ORDER — ALBUTEROL SULFATE 2.5 MG/3ML
2.5 SOLUTION RESPIRATORY (INHALATION) ONCE AS NEEDED
Status: DISCONTINUED | OUTPATIENT
Start: 2023-07-25 | End: 2023-07-25 | Stop reason: HOSPADM

## 2023-07-25 RX ORDER — ONDANSETRON 2 MG/ML
4 INJECTION INTRAMUSCULAR; INTRAVENOUS ONCE AS NEEDED
Status: DISCONTINUED | OUTPATIENT
Start: 2023-07-25 | End: 2023-07-25 | Stop reason: HOSPADM

## 2023-07-25 RX ORDER — DEXAMETHASONE SODIUM PHOSPHATE 10 MG/ML
INJECTION, SOLUTION INTRAMUSCULAR; INTRAVENOUS AS NEEDED
Status: DISCONTINUED | OUTPATIENT
Start: 2023-07-25 | End: 2023-07-25

## 2023-07-25 RX ORDER — SODIUM CHLORIDE, SODIUM LACTATE, POTASSIUM CHLORIDE, CALCIUM CHLORIDE 600; 310; 30; 20 MG/100ML; MG/100ML; MG/100ML; MG/100ML
INJECTION, SOLUTION INTRAVENOUS CONTINUOUS PRN
Status: DISCONTINUED | OUTPATIENT
Start: 2023-07-25 | End: 2023-07-25

## 2023-07-25 RX ORDER — MAGNESIUM HYDROXIDE 1200 MG/15ML
LIQUID ORAL AS NEEDED
Status: DISCONTINUED | OUTPATIENT
Start: 2023-07-25 | End: 2023-07-25 | Stop reason: HOSPADM

## 2023-07-25 RX ORDER — FENTANYL CITRATE 50 UG/ML
INJECTION, SOLUTION INTRAMUSCULAR; INTRAVENOUS AS NEEDED
Status: DISCONTINUED | OUTPATIENT
Start: 2023-07-25 | End: 2023-07-25

## 2023-07-25 RX ORDER — TRAMADOL HYDROCHLORIDE 50 MG/1
50 TABLET ORAL EVERY 6 HOURS PRN
Status: DISCONTINUED | OUTPATIENT
Start: 2023-07-25 | End: 2023-07-25 | Stop reason: HOSPADM

## 2023-07-25 RX ORDER — OXYCODONE HYDROCHLORIDE 5 MG/1
5 TABLET ORAL EVERY 4 HOURS PRN
Qty: 15 TABLET | Refills: 0 | Status: SHIPPED | OUTPATIENT
Start: 2023-07-25

## 2023-07-25 RX ORDER — ONDANSETRON 2 MG/ML
INJECTION INTRAMUSCULAR; INTRAVENOUS AS NEEDED
Status: DISCONTINUED | OUTPATIENT
Start: 2023-07-25 | End: 2023-07-25

## 2023-07-25 RX ORDER — KETAMINE HCL IN NACL, ISO-OSM 100MG/10ML
SYRINGE (ML) INJECTION AS NEEDED
Status: DISCONTINUED | OUTPATIENT
Start: 2023-07-25 | End: 2023-07-25

## 2023-07-25 RX ADMIN — MIDAZOLAM 2 MG: 1 INJECTION INTRAMUSCULAR; INTRAVENOUS at 09:33

## 2023-07-25 RX ADMIN — CEFAZOLIN SODIUM 2000 MG: 2 SOLUTION INTRAVENOUS at 09:36

## 2023-07-25 RX ADMIN — FENTANYL CITRATE 25 MCG: 50 INJECTION, SOLUTION INTRAMUSCULAR; INTRAVENOUS at 10:02

## 2023-07-25 RX ADMIN — FENTANYL CITRATE 25 MCG: 50 INJECTION, SOLUTION INTRAMUSCULAR; INTRAVENOUS at 10:15

## 2023-07-25 RX ADMIN — FENTANYL CITRATE 25 MCG: 50 INJECTION, SOLUTION INTRAMUSCULAR; INTRAVENOUS at 09:47

## 2023-07-25 RX ADMIN — PROPOFOL 200 MG: 10 INJECTION, EMULSION INTRAVENOUS at 09:40

## 2023-07-25 RX ADMIN — ONDANSETRON 4 MG: 2 INJECTION INTRAMUSCULAR; INTRAVENOUS at 10:15

## 2023-07-25 RX ADMIN — TRAMADOL HYDROCHLORIDE 50 MG: 50 TABLET, COATED ORAL at 11:22

## 2023-07-25 RX ADMIN — LIDOCAINE HYDROCHLORIDE 50 MG: 10 INJECTION, SOLUTION EPIDURAL; INFILTRATION; INTRACAUDAL; PERINEURAL at 09:40

## 2023-07-25 RX ADMIN — FENTANYL CITRATE 25 MCG: 50 INJECTION, SOLUTION INTRAMUSCULAR; INTRAVENOUS at 09:43

## 2023-07-25 RX ADMIN — Medication 20 MG: at 09:48

## 2023-07-25 RX ADMIN — SODIUM CHLORIDE, SODIUM LACTATE, POTASSIUM CHLORIDE, AND CALCIUM CHLORIDE: .6; .31; .03; .02 INJECTION, SOLUTION INTRAVENOUS at 09:32

## 2023-07-25 RX ADMIN — DEXAMETHASONE SODIUM PHOSPHATE 10 MG: 10 INJECTION, SOLUTION INTRAMUSCULAR; INTRAVENOUS at 09:40

## 2023-07-25 NOTE — ANESTHESIA POSTPROCEDURE EVALUATION
Post-Op Assessment Note    CV Status:  Stable    Pain management: satisfactory to patient     Mental Status:  Sleepy and arousable   Hydration Status:  Euvolemic   PONV Controlled:  Controlled   Airway Patency:  Patent      Post Op Vitals Reviewed: Yes      Staff: CRNA, Anesthesiologist         There were no known notable events for this encounter.     /95 (07/25/23 1030)    Temp 98.1 °F (36.7 °C) (07/25/23 1030)    Pulse 56 (07/25/23 1030)   Resp 16 (07/25/23 1030)    SpO2 95 % (07/25/23 1030)

## 2023-07-25 NOTE — OP NOTE
OPERATIVE REPORT  PATIENT NAME: Taya Baxter    :  1960  MRN: [id number]  Pt Location:  OR ROOM 12    SURGERY DATE: 2023    Surgeon(s) and Role:     * Nader Boinlla DO - Primary     * Khloe Camacho - Assisting    Preop Diagnosis:  Other tear of medial meniscus, current injury, left knee, initial encounter [S83.242A]    Post-Op Diagnosis Codes:     * Other tear of medial meniscus, current injury, left knee, initial encounter [S83.242A]    Procedure(s):  Left - ARTHROSCOPIC MENISCECTOMY MEDIAL    Specimen(s):  * No specimens in log *    Estimated Blood Loss:   Minimal    Drains:  * No LDAs found *    Anesthesia Type:   General/LMA    Operative Indications: Other tear of medial meniscus, current injury, left knee, initial encounter [C64.042D]    Complications:   None    Procedure and Technique:        Pre-operative Diagnosis: Left knee medial meniscus complex tear     Post-operative Diagnosis: Left knee medial meniscus complex tear     Operation:  Surgical arthroscopy of the Left knee with partial medial meniscectomy, CPT 33032        Anesthesia:  general     Tourniquet Time:  25 min     Blood Loss:  Minimal      Indications: Mr. Dionne Lord is a 70 y.o. male with a medial meniscus tear. An MRI was obtained a revealed a tear of the Left medial meniscus.  Due to the patient's MRI findings, active lifestyle, and lack of improvement with a conservative approach, it was recommended that they proceed forward with arthroscopic surgical management of this problem. We reviewed risks and benefits of surgery and a decision was made to proceed with surgery to address the torn medial meniscus.            Findings:       Examination under anesthesia of the operative Left knee revealed a range of motion of 0-130 degrees. Posterior drawer testing was negative. Lachman testing was negative.  Pivot shift testing was negative,  Collateral ligament stability testing revealed no laxity with valgus or varus stresses. With respect to posterolateral corner testing, dial testing at 30 and at 90 degrees was symmetric to the contralateral knee. Arthroscopic evaluation of the knee revealed the following:     Medial meniscus: There was a complex, multidirectional tear of the medial meniscus. .   Medial femoral condyle:Grade II chondral defects. Medial tibial plateau: Grade  III chondral defects. Anterior cruciate ligament: Normal appearance. Posterior cruciate ligament: Normal appearance. Lateral meniscus: No tears. Lateral femoral condyle: Grade II chondral defects. Lateral tibial plateau: GradeII chondral defects.    Medial and lateral gutters: No loose bodies. Patella: Grade IV chondral defects. Trochlea: Grade II chondral defects. Medial plica: No significant plica was present. .          Procedure:  In the pre-operative holding area, the patient identified the correct operative extremity and I marked that extremity with my initials, using a permanent marker. The patient was brought to the operating room and positioned supine. Following satisfactory induction of anesthesia, the Left knee was prepped and draped in the usual sterile fashion for surgical arthroscopy of the Left knee. Before any surgical instrumentation was passed to me by the surgical technician, a formalized time-out occurred, which involves the surgeon, circulating nurse, and anesthesia staff all verifying the correct operative extremity. My initials were visible on the prepped and draped operative field. The anatomic landmarks of the anteromedial and anterolateral portals were marked. The anterolateral portal was established with a scalpel. The arthroscope was introduced through this portal. Under direct visualization, the anteromedial portal was established with a localizing needle followed by a scalpel.  A probe was then introduced into the anteromedial portal. A systematic diagnostic arthroscopy evaluated the following:  medial compartment, notch, lateral compartment, patellofemoral compartment, medial gutter, and lateral gutter. A complex medial meniscus tear was noted. This tear was associated with meniscal fragments that were grossly unstable to probing. The medial meniscus tear was debrided to a stable base, using the arthroscopic biters and motorized shaver      There was no additional pathology. All particulate debris was removed. The knee was copiously rinsed and then drained. The portals were closed with an interrupted 4-0 Vicryl absorbable suture. The skin was cleansed with sterile saline and dried before Steri-Strips were applied. Finally, a sterile dressing was secured by Webril and an Ace wrap. I was present for all critical portions of the operation, which included the entire diagnostic arthroscopy and partial medial meniscectomy, and immediately available to return. The patient tolerated the procedure without complication and was transported to the recovery room in stable condition. I was present for the entire procedure., A qualified resident physician was not available. and A physician assistant was required during the procedure for retraction, tissue handling, dissection and suturing.     Patient Disposition:  PACU         SIGNATURE: Carlie Hernandez DO  DATE: July 25, 2023  TIME: 10:22 AM

## 2023-07-25 NOTE — DISCHARGE INSTR - AVS FIRST PAGE
POSTOPERATIVE INSTRUCTIONS following KNEE SURGERY    MEDICATIONS:  Resume all home medications unless otherwise instructed by your surgeon. Pain Medication:  Oxycodone 5 mg, 1 tablet every 4 hours as needed  If you were given a regional anesthetic (nerve block), please begin taking the pain medication as soon as you get home, even if you have minimal or no pain. DO NOT WAIT FOR THE NERVE BLOCK TO WEAR OFF. Possible side effects include nausea, constipation, and urinary retention. If you experience these side effects, please call our office for assistance. Pain med refills are authorized only during office hours (8am-4pm, Mon-Fri). Anti-Inflammatory:  Ibuprofen 600 mg, 1 tablet every 8 hours for 1- 4 weeks and Tylenol 325 mg, 1-2 tablets every 6 hours for 1- 4 weeks  Take with food. Stop if you experience nausea, reflux, or stomach pain. Nausea Medication:  None  Fill prescription ONLY if you expericnce severe nausea. Blood Clot Prevention:  Aspirin 81 mg, 1 tablet twice daily for 3 weeks  Pump your foot up and down 20 times per hour while you are less mobile. WOUND CARE:  Keep the dressing clean and dry. Light drainage may occur the first 2 days postop. You may remove the dressings and get the incision wet in the shower 72 hours after surgery. DO NOT remove steri-strips or sutures. DO NOT immerse the incision under water. Carefully pat the incision dry. If there is wound drainage, re-apply a fresh dry gauze dressing. Please call our office ([phone]) if you experience either of the following:  Sudden increase in swelling, redness, or warmth at the surgical site  Excessive incisional drainage that persists beyond the 3rd day after surgery  Oral temperature greater than 101 degrees, not relieved with Tylenol  Shortness of breath, chest pain, nausea, or any other concerning symptoms    SWELLING CONTROL:  Cold Therapy:   The cold therapy device may be used either continuously or only as needed, according to your preference. Do not let the pad directly touch your skin. Alternatively, apply ice (20 min on, 20 min off) as often as you feel is necessary. Elevation:  Elevate the entire leg above heart level. Place pillows under your ankle to keep your knee straight. Compression:  Apply ACE wraps or a thigh-length compression stocking as needed. RANGE OF MOTION:  You are allowed FULL RANGE OF MOTION as tolerated. IMMOBILIZATION:  None. You are allowed full range of motion as tolerated. ACTIVITY:   BEAR FULL WEIGHT AS TOLERATED on the operative leg. Use crutches to assist only as needed. Using Crutches on Stairs:  Going up, lead with your "good" (nonoperative) leg. Going down, lead with your "bad" (operative) leg. Use a hand rail when available. Knee Extension:  Place a rolled towel or pillow under your ankle for 20-30 minutes 3-5 times per day. This will help to maintain full knee extension. Quad Sets:  Sit or lie with your knee straight. Tighten your quadriceps (front thigh) muscle. Hold for 3 seconds, then relax. Repeat 20 times per hour while awake. PHYSICAL THERAPY:  Begin therapy 3 TO 5 DAYS AFTER SURGERY. You were given a prescription for therapy at your preoperative office visit. If you do not have physical therapy scheduled yet, please call our office for assistance. FOLLOW-UP APPOINTMENT:  7-10 days after surgery with:    VALERIANO BeardenOMehreen   [address] JENNY Arredondo, [facility]5 [facility]  [phone] ([address] [place] [address])  [phone] (After-Hours)

## 2023-07-25 NOTE — ANESTHESIA PREPROCEDURE EVALUATION
Procedure:  ARTHROSCOPIC MENISCECTOMY LATERAL /MEDIAL (Left: Knee)    Relevant Problems   CARDIO   (+) Benign essential hypertension   (+) Hyperlipidemia      GI/HEPATIC   (+) Esophageal reflux      /RENAL   (+) CKD (chronic kidney disease) stage 3, GFR 30-59 ml/min (HCC)   (+) Renal cell carcinoma, right (HCC)      MUSCULOSKELETAL   (+) Lumbar spondylosis      NEURO/PSYCH   (+) Chronic pain syndrome   (+) Seizure, epileptic (HCC)      PULMONARY   (+) Severe obstructive sleep apnea        Physical Exam    Airway    Mallampati score: II  TM Distance: <3 FB  Neck ROM: full     Dental   No notable dental hx     Cardiovascular  Cardiovascular exam normal    Pulmonary  Pulmonary exam normal     Other Findings        Anesthesia Plan  ASA Score- 2     Anesthesia Type- general with ASA Monitors. Additional Monitors:   Airway Plan: LMA. Plan Factors-Exercise tolerance (METS): >4 METS. Chart reviewed. Existing labs reviewed. Patient is not a current smoker. Patient not instructed to abstain from smoking on day of procedure. Patient did not smoke on day of surgery. Induction- intravenous. Postoperative Plan-     Informed Consent- Anesthetic plan and risks discussed with patient. I personally reviewed this patient with the CRNA. Discussed and agreed on the Anesthesia Plan with the CRNA. Ira Membreno

## 2023-07-27 ENCOUNTER — OFFICE VISIT (OUTPATIENT)
Dept: PHYSICAL THERAPY | Facility: CLINIC | Age: 63
End: 2023-07-27
Payer: MEDICARE

## 2023-07-27 DIAGNOSIS — S83.242A OTHER TEAR OF MEDIAL MENISCUS OF LEFT KNEE AS CURRENT INJURY, INITIAL ENCOUNTER: Primary | ICD-10-CM

## 2023-07-27 DIAGNOSIS — S83.242A OTHER TEAR OF MEDIAL MENISCUS, CURRENT INJURY, LEFT KNEE, INITIAL ENCOUNTER: ICD-10-CM

## 2023-07-27 PROCEDURE — 97162 PT EVAL MOD COMPLEX 30 MIN: CPT | Performed by: PHYSICAL THERAPIST

## 2023-07-27 PROCEDURE — 97110 THERAPEUTIC EXERCISES: CPT | Performed by: PHYSICAL THERAPIST

## 2023-07-27 NOTE — PROGRESS NOTES
PT Evaluation     Today's date: 2023  Patient name: Taya Baxter  : 1960  MRN: [id number]  Referring provider: Janet Tinoco,*  Dx:   Encounter Diagnosis     ICD-10-CM    1. Other tear of medial meniscus of left knee as current injury, initial encounter  S83.242A             Assessment  Assessment details: Taya Baxter is a 70 y.o. male who presents with pain, decreased strength, decreased ROM, decreased joint mobility, ambulatory dysfunction and balance dysfunction. Due to these impairments, patient has difficulty performing ADL's, recreational activities, engaging in social activities, stair negotiation, lifting/carrying, transfers. Patient's clinical presentation is consistent with their referring diagnosis of post op menisectomy of Left medial meniscal tear (post op PT visit). Patient has been educated in post-op contraindications / precautions, gait training, weight bearing status, home exercise program and plan of care. Patient would benefit from skilled physical therapy services to address their aforementioned functional limitations and progress towards prior level of function and independence with home exercise program.     Impairments: abnormal gait, abnormal or restricted ROM, activity intolerance, impaired balance, impaired physical strength, lacks appropriate home exercise program, pain with function, weight-bearing intolerance, poor posture  and poor body mechanics  Functional limitations: walk/stand, STS, stair negotiation, squat, in/out bed/car   Prognosis: fair  Prognosis details: + factors: high motivation levels  - factors: BMI, age, chronic pain, History of R menisectomy    Goals  Short Term Goals to be accomplished by 23: (1 month post op)  STG1: Pt will be I with HEP  STG2: Pt will demo 0-120 degrees in knee AROM to improve gait. STG3: Pt will demo 5/5 MMT strength in knee to improve stair negotiation.    STG4: Pt will amb community distance without gait deviates due to pain. Long Term Goals to be accomplished 10/11/23:  1. Pt will be able to perform STS without UE support with normal mechanics. 2. Pt will be able to go up/down stairs without hand rail and with reciprocal gait pattern. 3. Pt will be able to walk/stand for >2 hour to do house tasks and yard work without pain and normal mechanics. Plan  Plan details: HEP development, stretching, strengthening, A/AA/PROM, joint mobilizations, posture education, STM/MI as needed to reduce muscle tension, muscle reeducation, PLOC discussed and agreed upon with patient. Patient would benefit from: PT eval and skilled physical therapy  Planned modality interventions: cryotherapy, thermotherapy: hydrocollator packs and unattended electrical stimulation  Planned therapy interventions: manual therapy, neuromuscular re-education, self care, therapeutic activities, therapeutic exercise, home exercise program, patient education, joint mobilization, balance, strengthening, stretching and therapeutic training  Frequency: 2x week  Duration in weeks: 12  Plan of Care beginning date: 7/27/2023  Plan of Care expiration date: 10/11/2023  Treatment plan discussed with: patient    Subjective Evaluation    History of Present Illness  Mechanism of injury: Pt is a 69 y/o male who presents to PT for post op  ARTHROSCOPIC MENISCECTOMY LATERAL /MEDIAL (Left: Knee) to be performed on 7/25/23. Patient has been seeing Dr. Lesia Jaramillo since December. Has not had relief except of all non weight bearing. Stated that everything went well with surgery and he was released to home the same day. Noted that he has been following his post op instructions from his physician. Noted that he has been able to walk without a AD. Taking Tylenol for pain control, has not been taking the prescribed Narcotics.      Patient Goals  Patient goals for therapy: increased motion, improved balance, decreased pain, decreased edema, increased strength and independence with ADLs/IADLs  Patient goal: walk/stand, STS, stair negotiation, squat, in/out bed/car  Pain  Current pain ratin  At best pain ratin  At worst pain ratin-8 with walking  Location: Left knee   Quality: tight, pulling and discomfort  Relieving factors: Tylenol, relaxation and rest  Aggravating factors: stair climbing, walking, standing, lifting and sitting    Treatments  Current treatment: physical therapy    Objective     Observations   Left Knee   Positive for edema. Tenderness   Left Knee   Tenderness in the ITB and pes anserinus. Active Range of Motion   Left Knee   Flexion: 90 degrees with pain  Extension: 5 degrees with pain    Right Knee   Flexion: WFL  Extension: WFL    Passive Range of Motion   Left Knee   Flexion: 100 degrees with pain  Extension: 0 degrees with pain    Right Knee   Flexion: WFL  Extension: WFL    Strength/Myotome Testing     Left Knee   Flexion: 4-  Extension: 4-    Right Knee   Flexion: 5  Extension: 5    Additional Strength Details  U/l heel raise: NOT TESTED   R: reps  L: reps     Tests     Left Knee   Negative valgus stress test at 30 degrees and varus stress test at 30 degrees.      Additional Tests Details  SLS: NOT TESTED  R: sec  L: sec       Precautions:   Past Medical History:   Diagnosis Date   • Accelerated essential hypertension    • Acquired spondylolisthesis    • Atypical chest pain    • CA of prostate (HCC)    • CPAP (continuous positive airway pressure) dependence    • De Quervain's tenosynovitis    • Epileptic seizure ([address])    • Esophageal reflux    • Fatigue    • Hematuria    • Hyperglycemia     last assessed: 3/28/2013   • Hyperlipidemia    • Impaired fasting glucose    • Lightheadedness    • Lower back pain    • Lumbar foraminal stenosis    • Lumbar spondylosis    • Malignant melanoma of skin (HCC)    • Microscopic hematuria     last assessed: 2014   • Obesity    • Plantar fasciitis    • Polyp of sigmoid colon    • PONV (postoperative nausea and vomiting)    • Prostate cancer ([address])     recevied radiation 5/2022 - 9/2022   • Renal cell carcinoma, right (HCC)    • Right kidney mass    • Sleep apnea    • Spinal stenosis    • Status post medial meniscectomy of right knee 07/21/2021       Manuals 7/27        STM ITB, HS, calf, adductors         PROM stretch         Pat mobs                   Neuro Re-Ed 7/27                                                                       Ther Ex 7/27        SLR x10 ea        S/l hip abd x10 ea        Seated HS stretch :30x        Bridge         Calf stretch step                  Reviewed and educated  On updated HEP and weight bearing and progression of activity                 Ther Activity 7/27

## 2023-07-28 ENCOUNTER — DOCUMENTATION (OUTPATIENT)
Age: 63
End: 2023-07-28

## 2023-07-28 ENCOUNTER — OFFICE VISIT (OUTPATIENT)
Age: 63
End: 2023-07-28
Payer: MEDICARE

## 2023-07-28 VITALS
TEMPERATURE: 97.4 F | HEART RATE: 63 BPM | SYSTOLIC BLOOD PRESSURE: 144 MMHG | WEIGHT: 246 LBS | BODY MASS INDEX: 38.53 KG/M2 | OXYGEN SATURATION: 93 % | DIASTOLIC BLOOD PRESSURE: 82 MMHG

## 2023-07-28 DIAGNOSIS — G47.33 OSA (OBSTRUCTIVE SLEEP APNEA): Primary | ICD-10-CM

## 2023-07-28 LAB

## 2023-07-28 PROCEDURE — 99203 OFFICE O/P NEW LOW 30 MIN: CPT | Performed by: INTERNAL MEDICINE

## 2023-07-28 NOTE — ASSESSMENT & PLAN NOTE
Patient has a history of severe obstructive sleep apnea with home sleep study in 2018 showing CINDY of 61.1 events per hour. He is currently on a CPAP auto titrating 10-11 cm H2O. Previous CPAP study has shown efficacy of nasal CPAP 5-10 cm H2O    He is not getting updated supplies and has not been following up with the sleep doctor and therefore is following up today. He brings a refurbished DreamStation 1 set at auto 10-11 cm H2O. He is using a full facemask. Check compliance via manual interrogation of the machine over the past 30 days he has excellent mask fit, 9.9 hours of use per day with residual AHI 1.0 events per hour. No pressure change needed.   Resupply prescription provided    Follow-up in 1 year

## 2023-07-28 NOTE — PROGRESS NOTES
Sleep Medicine Outpatient Note   Karly Turcios 70 y.o. male MRN: [id number]  7/28/2023      Referring Physician: Simone Mo DO    Reason for Consultation:    Chief Complaint   Patient presents with   • Sleep Apnea     Assessment/Plan:    1. MENDY (obstructive sleep apnea)  Assessment & Plan:  Patient has a history of severe obstructive sleep apnea with home sleep study in 2018 showing CINDY of 61.1 events per hour. He is currently on a CPAP auto titrating 10-11 cm H2O. Previous CPAP study has shown efficacy of nasal CPAP 5-10 cm H2O    He is not getting updated supplies and has not been following up with the sleep doctor and therefore is following up today. He brings a refurbished DreamStation 1 set at auto 10-11 cm H2O. He is using a full facemask. Check compliance via manual interrogation of the machine over the past 30 days he has excellent mask fit, 9.9 hours of use per day with residual AHI 1.0 events per hour. No pressure change needed. Resupply prescription provided    Follow-up in 1 year    Orders:  -     PAP DME Resupply/Reorder        Health Maintenance  Immunization History   Administered Date(s) Administered   • Tdap 08/20/2010, 12/15/2020        Return in about 1 year (around 7/28/2024). History of Present Illness   HPI:  Karly Turcios is a 70 y.o. male who has a past medical history of prostate cancer status post resection, on radiation therapy, history of renal cell carcinoma status post right nephrectomy hypertension, vitamin D deficiency, seizure history, severe obstructive sleep apnea who is presenting for follow-up of MENDY    Diagnosed with MENDY in 2018. Home sleep study with CINDY 61.1. Subsequent CPAP study showed efficacy of 5-10 cm H2O. Currently is using a DreamStation 1, refurbished. Setting is 10-11 cm H2O    He is with [facility].   Has not been getting supplies due to lack of follow up with sleep doctors over the past 5 years    He is on CPAP [id number]    DreamStation 1 - replaced with refurbished machine. 9.9 hours of use over past 30 days. AHI 1.0 event/hour    His sleep time is consistently between 9:30 PM to 7 AM.  He has no issues with sleep initiation. He does have to wake up 4-5 times to use the bathroom. He has a history of prostate cancer and has had surgery for this and is now getting radiation due to elevated PSA. During the day he takes a nap around 1pm in the afternoon. He takes the nap for 1-1.5 hour with CPAP on. He works as a supervisor at [employer] He does not do night work. He has occasional vivid dreams. No sleepwalking/acting out dreams. No ETOH before sleeping. No medicaitons for sleep aid or staying awake. No periodic limb movements or restless legs.       Questionnaires:   [place] is 8    Historical Information   Past Medical History:   Diagnosis Date   • Accelerated essential hypertension    • Acquired spondylolisthesis    • Atypical chest pain    • CA of prostate ([address])    • COVID-19     3/2023   • CPAP (continuous positive airway pressure) dependence    • De Quervain's tenosynovitis    • Epileptic seizure ([address])     pt reports last seizure 1980   • Esophageal reflux    • Fatigue    • Hematuria    • Hyperglycemia     last assessed: 3/28/2013   • Hyperlipidemia    • Impaired fasting glucose    • Lightheadedness    • Lower back pain    • Lumbar foraminal stenosis    • Lumbar spondylosis    • Malignant melanoma of skin (HCC)    • Microscopic hematuria     last assessed: 6/16/2014   • Obesity    • Plantar fasciitis    • Polyp of sigmoid colon    • PONV (postoperative nausea and vomiting)     s/p nephrectomy   • Prostate cancer ([address])     recevied radiation 5/2022 - 9/2022   • Renal cell carcinoma, right (HCC)    • Right kidney mass    • Sleep apnea    • Spinal stenosis    • Status post medial meniscectomy of right knee 07/21/2021     Past Surgical History:   Procedure Laterality Date   • CHOLECYSTECTOMY • HERNIA REPAIR     • NEPHRECTOMY Right    • [place] ARTHRS KNE SURG W/MENISCECTOMY MED/LAT W/SHVG Right 05/27/2020    Procedure: MEDIAL Menisectomy Right knee;  Surgeon: Sarah Shah DO;  Location:  MAIN OR;  Service: Orthopedics   • [place] ARTHRS KNE SURG W/MENISCECTOMY MED/LAT W/SHVG Left 7/25/2023    Procedure: ARTHROSCOPIC MENISCECTOMY MEDIAL;  Surgeon: Sarah Shah DO;  Location: [address] [place] [address] MAIN OR;  Service: Orthopedics   • PROSTATECTOMY       Family History   Problem Relation Age of Onset   • Hypertension Mother         essential   • Diabetes Father         mellitus   • Tremor Father         involuntary shaking or tremblin movements (tremor)   • Hypertension Brother         essential         Meds/Allergies     Current Outpatient Medications:   •  acetaminophen (TYLENOL) 500 mg tablet, Take 1,000 mg by mouth every 6 (six) hours as needed for mild pain, Disp: , Rfl:   •  amLODIPine (NORVASC) 10 mg tablet, TAKE 1 TABLET BY MOUTH EVERY DAY, Disp: 30 tablet, Rfl: 8  •  aspirin (ECOTRIN LOW STRENGTH) 81 mg EC tablet, Take 1 tablet (81 mg total) by mouth 2 (two) times a day for 21 days, Disp: 42 tablet, Rfl: 0  •  Cholecalciferol (Vitamin D3) 125 MCG (5000 UT) TABS, Take 5,000 Units by mouth daily, Disp: , Rfl:   •  LORazepam (Ativan) 0.5 mg tablet, Take 1 tab 1 hour prior to trip.   May repeat in 4 hours if needed, Disp: 20 tablet, Rfl: 0  •  phenytoin (DILANTIN) 100 mg ER capsule, TAKE 1 CAPSULE BY MOUTH THREE TIMES A DAY, Disp: 270 capsule, Rfl: 3  •  scopolamine (TRANSDERM-SCOP) 1 mg/3 days TD 72 hr patch, Place 1 patch on the skin over 72 hours every third day, Disp: 4 patch, Rfl: 0  •  oxyCODONE (ROXICODONE) 5 immediate release tablet, Take 1 tablet (5 mg total) by mouth every 4 (four) hours as needed for moderate pain for up to 15 doses Max Daily Amount: 30 mg (Patient not taking: Reported on 7/28/2023), Disp: 15 tablet, Rfl: 0  Allergies   Allergen Reactions   • Ciprofloxacin Other (See Comments)     Other reaction(s): RAPID IRREGULAR HEART BEAT   • Fluticasone-Salmeterol GI Intolerance       Vitals: Blood pressure 144/82, pulse 63, temperature (!) 97.4 °F (36.3 °C), temperature source Tympanic, weight 112 kg (246 lb), SpO2 93 %. Body mass index is 38.53 kg/m². Oxygen Therapy  SpO2: 93 %  Oxygen Therapy: None (Room air)      Physical Exam  Vitals and nursing note reviewed. Constitutional:       General: He is not in acute distress. Appearance: He is well-developed. He is not ill-appearing, toxic-appearing or diaphoretic. HENT:      Head: Normocephalic and atraumatic. Eyes:      General: No scleral icterus. Extraocular Movements: Extraocular movements intact. Conjunctiva/sclera: Conjunctivae normal.   Pulmonary:      Effort: Pulmonary effort is normal. No respiratory distress. Breath sounds: No stridor. Abdominal:      Tenderness: There is no guarding. Musculoskeletal:         General: No swelling. Cervical back: Normal range of motion and neck supple. Right lower leg: No edema. Left lower leg: No edema. Skin:     General: Skin is warm and dry. Neurological:      General: No focal deficit present. Mental Status: He is alert and oriented to person, place, and time. Mental status is at baseline. Psychiatric:         Mood and Affect: Mood normal.             Labs: I have personally reviewed pertinent lab results.     ABG: No results found for: "PHART", "LWF0NVU", "PO2ART", "VTM0DAO", "X5CFQHTI", "BEART", "SOURCE",   BNP: No results found for: "BNP",   CBC:  Lab Results   Component Value Date    WBC 8.13 07/18/2023    HGB 14.6 07/18/2023    HCT 43.7 07/18/2023    MCV 89 07/18/2023     07/18/2023    EOSPCT 3 01/13/2021    EOSABS 0.2 01/13/2021    NEUTOPHILPCT 57 01/13/2021    LYMPHOPCT 31 01/13/2021   ,   CMP:   Lab Results   Component Value Date    SODIUM 141 07/18/2023    K 4.1 07/18/2023     (H) 07/18/2023    CO2 23 07/18/2023    BUN 19 07/18/2023 CREATININE 1.31 (H) 07/18/2023    GLUCOSE 101 (H) 03/16/2016    CALCIUM 9.2 07/18/2023    AST 21 01/13/2021    ALT 24 01/13/2021    EGFR 56 07/18/2023   ,   PT/INR:   Lab Results   Component Value Date    INR 0.95 06/04/2020   ,   Ferrtin: No components found for: "FERRTIN",  Magensium: No results found for: "MAGNESIUM",      Imaging and other studies: I have personally reviewed pertinent reports. and I have personally reviewed pertinent films in PACS      Sleep Study:  Home sleep study 2018-CINDY 61.1  CPAP titration study 2018- 5-10 cm H2O was adequate. Compliance data:  30/30 days used  DreamStation 1 10-31duH1C (refurbished)  No significant mask leak  Residual AHI 1.0    Reena Galarza MD  Pulmonary, Critical Care and Sleep Medicine  [facility]     Portions of the record may have been created with voice recognition software. Occasional wrong word or "sound a like" substitutions may have occurred due to the inherent limitations of voice recognition software. Please read the chart carefully and recognize, using context, where substitutions have occurred.

## 2023-07-28 NOTE — PATIENT INSTRUCTIONS
CPAP MAINTENANCE AND MANAGEMENT    1. Continue use of CPAP equipment nightly - use any time you are laying down to rest, watch TV, etc to increase use and in case you fall asleep to prevent falling asleep without it  2. If air is too hot, turn down the tubing heating setting  3. If excessive dry mouth in the morning, turn up humidifier setting and be sure to only use distilled water in your humidifier. You can also turn down the tubing heating setting  4. Change your filter regularly and wash the non-disposable filter  5. Continue to clean your equipment, as discussed, using mild soap and water. You can use unscented baby wipe on the mask. 6.  Contact [facility] with any questions or concerns prior to your next visit, as needed  7. Schedule visit for follow-up in 1 year. You should see your sleep physician at least once a year. HOW TO CLEAN YOUR AUTO CPAP MACHINE    Mask: Clean the cushion of your mask daily. Dish soap and warm water. (Usually eligible for mask cushions every 3 months)  2. Headgear: Once a week. I find when you wash it daily it eats the material of the Velcro faster.  (Usually eligible for new headgear every 6 months)  3. Heated Tubing: Clean the tube every 3-7 days. One drop of dish soap run warm water through the tube then hang it over your shower curtain and let it drip dry. (Usually eligible every 3 months)  4. Humidifier: Rinse out every 1-3 days. Dish soap warm water or , top shelf. (eligible every 6 months) **DISTILLED WATER ONLY**  5. Filters:  Dark blue (reusable for 6 months)- Rinse under warm water (no soap) sit and drip dry every 2-3 weeks. (eligible for a new one every 6 months)  Light Blue (disposable) throw away every 2-4 weeks depending on how dirty it looks. (eligible for 6 every 3 months)    Check with your insurance and durable medical equipment company regarding supply replacements.      Nursing Support:  When: Monday through Friday 7A-5PM except holidays  Where: Our direct line is [phone]. If you are having a true emergency please call 911. In the event that the line is busy or it is after hours please leave a voice message and we will return your call. Please speak clearly, leaving your full name, birth date, best number to reach you and the reason for your call. Medication refills: We will need the name of the medication, the dosage, the ordering provider, whether you get a 30 or 90 day refill, and the pharmacy name and address. Medications will be ordered by the provider only. Nurses cannot call in prescriptions. Please allow 7 days for medication refills. Physician requested updates: If your provider requested that you call with an update after starting medication, please be ready to provide us the medication and dosage, what time you take your medication, the time you attempt to fall asleep, time you fall asleep, when you wake up, and what time you get out of bed. Sleep Study Results: We will contact you with sleep study results and/or next steps after the physician has reviewed your testing. Usually one of our nurses will call to talk about the results within 1-2 weeks of testing.

## 2023-07-31 LAB

## 2023-08-01 ENCOUNTER — OFFICE VISIT (OUTPATIENT)
Dept: PHYSICAL THERAPY | Facility: CLINIC | Age: 63
End: 2023-08-01
Payer: MEDICARE

## 2023-08-01 DIAGNOSIS — S83.242A OTHER TEAR OF MEDIAL MENISCUS OF LEFT KNEE AS CURRENT INJURY, INITIAL ENCOUNTER: Primary | ICD-10-CM

## 2023-08-01 DIAGNOSIS — S83.242A OTHER TEAR OF MEDIAL MENISCUS, CURRENT INJURY, LEFT KNEE, INITIAL ENCOUNTER: ICD-10-CM

## 2023-08-01 PROCEDURE — 97110 THERAPEUTIC EXERCISES: CPT

## 2023-08-01 PROCEDURE — 97140 MANUAL THERAPY 1/> REGIONS: CPT

## 2023-08-01 NOTE — PROGRESS NOTES
Daily Note     Today's date: 2023  Patient name: Frank Peck  : 1960  MRN: [id number]  Referring provider: Lorraine Yen,*  Dx:   Encounter Diagnosis     ICD-10-CM    1. Other tear of medial meniscus of left knee as current injury, initial encounter  S83.242A       2. Other tear of medial meniscus, current injury, left knee, initial encounter  S83.242A                      Subjective: Suresh Campos reports the last few days he has been having increased tightness/discomfort along outside of knee and into hamstrings. Notes concerns about DVT. Has been complaint with HEP daily. Objective: See treatment diary below      Assessment: Kishore tolerated PT treatment well. Educated pt on signs/symptoms of DVT, pt displaying none currently. STM focusing along L quadriceps, ITB, and hamstring musculature to address tone. Passive knee stretching performed, ROM progressing appropriately per protocol allowance. Tactile cues required to correct compensation with s/l hip abduction. Glute weakness evident. Pt would benefit from continued PT to further address impairments and maximize functional level. Plan: Continue per plan of care.       Precautions:   Past Medical History:   Diagnosis Date   • Accelerated essential hypertension    • Acquired spondylolisthesis    • Atypical chest pain    • CA of prostate (HCC)    • CPAP (continuous positive airway pressure) dependence    • De Quervain's tenosynovitis    • Epileptic seizure ([address])    • Esophageal reflux    • Fatigue    • Hematuria    • Hyperglycemia     last assessed: 3/28/2013   • Hyperlipidemia    • Impaired fasting glucose    • Lightheadedness    • Lower back pain    • Lumbar foraminal stenosis    • Lumbar spondylosis    • Malignant melanoma of skin (HCC)    • Microscopic hematuria     last assessed: 2014   • Obesity    • Plantar fasciitis    • Polyp of sigmoid colon    • PONV (postoperative nausea and vomiting)    • Prostate cancer ([address])     recevied radiation 5/2022 - 9/2022   • Renal cell carcinoma, right [facility])    • Right kidney mass    • Sleep apnea    • Spinal stenosis    • Status post medial meniscectomy of right knee 07/21/2021       Manuals 7/19 8/1       STM ITB, HS, calf, adductors  JW       PROM stretch  JW       Pat mobs                   Neuro Re-Ed 7/19 8/1                                                                      Ther Ex 7/19 8/1       SLR 2x10 ea 2x10 ea       S/l hip abd 2x10 ea 2x10 ea        Seated HS stretch :30x2 :30x3        Bridge  2x10        Calf stretch step  Wedge :30x3        Heel slides   :10x10                          Ther Activity 7/19 8/1

## 2023-08-04 ENCOUNTER — OFFICE VISIT (OUTPATIENT)
Dept: OBGYN CLINIC | Facility: MEDICAL CENTER | Age: 63
End: 2023-08-04

## 2023-08-04 ENCOUNTER — OFFICE VISIT (OUTPATIENT)
Dept: PHYSICAL THERAPY | Facility: CLINIC | Age: 63
End: 2023-08-04
Payer: MEDICARE

## 2023-08-04 VITALS
WEIGHT: 244 LBS | SYSTOLIC BLOOD PRESSURE: 137 MMHG | BODY MASS INDEX: 38.3 KG/M2 | HEIGHT: 67 IN | DIASTOLIC BLOOD PRESSURE: 84 MMHG | HEART RATE: 54 BPM

## 2023-08-04 DIAGNOSIS — S83.242A OTHER TEAR OF MEDIAL MENISCUS, CURRENT INJURY, LEFT KNEE, INITIAL ENCOUNTER: ICD-10-CM

## 2023-08-04 DIAGNOSIS — Z98.890 S/P MEDIAL MENISCECTOMY OF LEFT KNEE: Primary | ICD-10-CM

## 2023-08-04 DIAGNOSIS — S83.242A OTHER TEAR OF MEDIAL MENISCUS OF LEFT KNEE AS CURRENT INJURY, INITIAL ENCOUNTER: Primary | ICD-10-CM

## 2023-08-04 PROCEDURE — 99024 POSTOP FOLLOW-UP VISIT: CPT | Performed by: ORTHOPAEDIC SURGERY

## 2023-08-04 PROCEDURE — 97140 MANUAL THERAPY 1/> REGIONS: CPT | Performed by: PHYSICAL THERAPIST

## 2023-08-04 PROCEDURE — 97110 THERAPEUTIC EXERCISES: CPT | Performed by: PHYSICAL THERAPIST

## 2023-08-04 NOTE — PROGRESS NOTES
Daily Note     Today's date: 2023  Patient name: Tigist Hickey  : 1960  MRN: [id number]  Referring provider: Brandi Villarreal,*  Dx:   Encounter Diagnosis     ICD-10-CM    1. Other tear of medial meniscus of left knee as current injury, initial encounter  S83.242A       2. Other tear of medial meniscus, current injury, left knee, initial encounter  S83.242A              Subjective: Pt reported that he went to his orthopedic today, who was content with his current level of progress. Objective: See treatment diary below    Assessment: Tolerated treatment well. Patient demonstrated fatigue post treatment, exhibited good technique with therapeutic exercises and would benefit from continued PT. Plan: Continue per plan of care.       Precautions:   Past Medical History:   Diagnosis Date   • Accelerated essential hypertension    • Acquired spondylolisthesis    • Atypical chest pain    • CA of prostate (HCC)    • CPAP (continuous positive airway pressure) dependence    • De Quervain's tenosynovitis    • Epileptic seizure ([address])    • Esophageal reflux    • Fatigue    • Hematuria    • Hyperglycemia     last assessed: 3/28/2013   • Hyperlipidemia    • Impaired fasting glucose    • Lightheadedness    • Lower back pain    • Lumbar foraminal stenosis    • Lumbar spondylosis    • Malignant melanoma of skin (HCC)    • Microscopic hematuria     last assessed: 2014   • Obesity    • Plantar fasciitis    • Polyp of sigmoid colon    • PONV (postoperative nausea and vomiting)    • Prostate cancer ([address])     recevied radiation 2022 - 2022   • Renal cell carcinoma, right (HCC)    • Right kidney mass    • Sleep apnea    • Spinal stenosis    • Status post medial meniscectomy of right knee 2021       Manuals       STM ITB, HS, calf, adductors  DANIEL GUPTA      PROM stretch  DANIEL GUPTA      Pat mobs                   Neuro Re-Ed  Ther Ex 7/19 8/1 8/4      SLR 2x10 ea 2x10 ea 3x10       S/l hip abd 2x10 ea 2x10 ea  3x10       Seated HS stretch :30x2 :30x3        Bridge  2x10  2x10       Calf stretch step  Wedge :30x3        Heel slides   :10x10        SAQ   Light MRE 3x10   :30x3      LAQ    :30x3 MRE light  3x10                Ther Activity 7/19 8/1 8/4      Step ups         Lateral step down         Forward step down

## 2023-08-04 NOTE — PROGRESS NOTES
Knee Post Operative Visit     Assesment:     Surgical Arthroscopy of the left knee with medial meniscectomy 7/25/23    Plan:    Post-Operative treatment:    Ice to knee for 20 minutes at least 1-2 times daily. Continue PT for ROM/strengthening to knee, hip and core. OTC NSAIDS prn for pain. Imaging: All imaging from today was reviewed by myself and explained to the patient. Weight bearing:  as tolerated     ROM:  Full    Brace:  No brace needed    DVT Prophylaxis:  Aspirin 325 mg oral twice daily x 3 weeks    Follow up:   5 weeks    Patient was advised that if they have any fevers, chills, chest pain, shortness of breath, redness or drainage from the incision, please let our office know immediately. History of Present Illness: The patient is a 70 y.o. male who is being evaluated post operatively 1 week  status post arthroscopic partial medial meniscectomy 7/25/23. Since the prior visit, He reports significant improvement. Pain is well controlled. The patient is using ice to control swelling. They have started physical therapy. The patient has been ambulating without crutches. The patient has been ambulating without a brace. The patient denies any fevers, chills, calf pain, chest pain/shortness of breath, redness or drainage from the incision. I have reviewed the past medical, surgical, social and family history, medications and allergies as documented in the EMR. Review of systems: ROS is negative other than that noted in the HPI. Constitutional: Negative for fatigue and fever. Physical Exam:    Blood pressure 137/84, pulse (!) 54, height 5' 7" (1.702 m), weight 111 kg (244 lb).     General/Constitutional: NAD, well developed, well nourished  HENT: Normocephalic, atraumatic  CV: Intact distal pulses, regular rate  Resp: No respiratory distress or labored breathing  Lymphatic: No lymphadenopathy palpated  Neuro: Alert and Oriented x 3, no focal deficits  Psych: Normal mood, normal affect, normal judgement, normal behavior  Skin: Warm, dry, no rashes, no erythema       Knee Exam (focused):                   RIGHT LEFT   ROM:   0-130 0-130   Palpation: Effusion negative mild     MJL tenderness Negative Positive     LJL tenderness Negative Negative   Instability: Varus stable stable     Valgus stable stable   Special Tests: Lachman Negative Negative     Posterior drawer Negative Negative     Anterior drawer Negative Negative     Pivot shift not tested not tested     Dial not tested not tested   Patella: Palpation no tenderness no tenderness     Mobility 1/4 1/4     Apprehension Negative Negative   Other: Single leg 1/4 squat not tested not tested      Incisions show no erythema, no drainage    LE NV Exam: +2 DP/PT pulses bilaterally  Sensation intact to light touch L2-S1 bilaterally     Bilateral hip ROM demonstrates no pain actively or passively    No calf tenderness to palpation bilaterally      Scribe Attestation    I,:  Vannessa Acevedo am acting as a scribe while in the presence of the attending physician.:       I,:  Tiarra Bonilla, DO personally performed the services described in this documentation    as scribed in my presence.:

## 2023-08-04 NOTE — PROGRESS NOTES
Daily Note     Today's date: 2023  Patient name: Armando Reyna  : 1960  MRN: [id number]  Referring provider: Suresh Dubose,*  Dx:   No diagnosis found. Subjective: Liz Upton reports the last few days he has been having increased tightness/discomfort along outside of knee and into hamstrings. Notes concerns about DVT. Has been complaint with HEP daily. Objective: See treatment diary below      Assessment: Kishore tolerated PT treatment well. Educated pt on signs/symptoms of DVT, pt displaying none currently. STM focusing along L quadriceps, ITB, and hamstring musculature to address tone. Passive knee stretching performed, ROM progressing appropriately per protocol allowance. Tactile cues required to correct compensation with s/l hip abduction. Glute weakness evident. Pt would benefit from continued PT to further address impairments and maximize functional level. Plan: Continue per plan of care.       Precautions:   Past Medical History:   Diagnosis Date   • Accelerated essential hypertension    • Acquired spondylolisthesis    • Atypical chest pain    • CA of prostate (HCC)    • CPAP (continuous positive airway pressure) dependence    • De Quervain's tenosynovitis    • Epileptic seizure ([address])    • Esophageal reflux    • Fatigue    • Hematuria    • Hyperglycemia     last assessed: 3/28/2013   • Hyperlipidemia    • Impaired fasting glucose    • Lightheadedness    • Lower back pain    • Lumbar foraminal stenosis    • Lumbar spondylosis    • Malignant melanoma of skin (HCC)    • Microscopic hematuria     last assessed: 2014   • Obesity    • Plantar fasciitis    • Polyp of sigmoid colon    • PONV (postoperative nausea and vomiting)    • Prostate cancer ([address])     recevied radiation 2022 - 2022   • Renal cell carcinoma, right (HCC)    • Right kidney mass    • Sleep apnea    • Spinal stenosis    • Status post medial meniscectomy of right knee 2021       Manuals  STM ITB, HS, calf, adductors  JW JW      PROM stretch  JW JW      Pat mobs                   Neuro Re-Ed 7/19 8/1 8/4                                                                     Ther Ex 7/19 8/1 8/4      SLR 2x10 ea 2x10 ea       S/l hip abd 2x10 ea 2x10 ea        Seated HS stretch :30x2 :30x3        Bridge  2x10        Calf stretch step  Wedge :30x3        Heel slides   :10x10                          Ther Activity 7/19 8/1

## 2023-08-08 ENCOUNTER — OFFICE VISIT (OUTPATIENT)
Dept: PHYSICAL THERAPY | Facility: CLINIC | Age: 63
End: 2023-08-08
Payer: MEDICARE

## 2023-08-08 DIAGNOSIS — S83.242A OTHER TEAR OF MEDIAL MENISCUS, CURRENT INJURY, LEFT KNEE, INITIAL ENCOUNTER: ICD-10-CM

## 2023-08-08 DIAGNOSIS — S83.242A OTHER TEAR OF MEDIAL MENISCUS OF LEFT KNEE AS CURRENT INJURY, INITIAL ENCOUNTER: Primary | ICD-10-CM

## 2023-08-08 PROCEDURE — 97530 THERAPEUTIC ACTIVITIES: CPT | Performed by: PHYSICAL THERAPIST

## 2023-08-08 PROCEDURE — 97140 MANUAL THERAPY 1/> REGIONS: CPT | Performed by: PHYSICAL THERAPIST

## 2023-08-08 PROCEDURE — 97110 THERAPEUTIC EXERCISES: CPT | Performed by: PHYSICAL THERAPIST

## 2023-08-08 NOTE — PROGRESS NOTES
Daily Note     Today's date: 2023  Patient name: Veronica Marc  : 1960  MRN: [id number]  Referring provider: Mirtha Quick,*  Dx:   Encounter Diagnosis     ICD-10-CM    1. Other tear of medial meniscus of left knee as current injury, initial encounter  S83.242A       2. Other tear of medial meniscus, current injury, left knee, initial encounter  S83.242A              Subjective: Pt reported persisting soreness and stiffness on posterior knee. However noted that he is improving with his weight bearing capacity. Objective: See treatment diary below    Assessment: Tolerated treatment well. Patient demonstrated fatigue post treatment, exhibited good technique with therapeutic exercises and would benefit from continued PT    Plan: Continue per plan of care.       Precautions:   Past Medical History:   Diagnosis Date   • Accelerated essential hypertension    • Acquired spondylolisthesis    • Atypical chest pain    • CA of prostate (HCC)    • CPAP (continuous positive airway pressure) dependence    • De Quervain's tenosynovitis    • Epileptic seizure ([address])    • Esophageal reflux    • Fatigue    • Hematuria    • Hyperglycemia     last assessed: 3/28/2013   • Hyperlipidemia    • Impaired fasting glucose    • Lightheadedness    • Lower back pain    • Lumbar foraminal stenosis    • Lumbar spondylosis    • Malignant melanoma of skin (HCC)    • Microscopic hematuria     last assessed: 2014   • Obesity    • Plantar fasciitis    • Polyp of sigmoid colon    • PONV (postoperative nausea and vomiting)    • Prostate cancer ([address])     recevied radiation 2022 - 2022   • Renal cell carcinoma, right (HCC)    • Right kidney mass    • Sleep apnea    • Spinal stenosis    • Status post medial meniscectomy of right knee 2021       Manuals      STM ITB, HS, calf, adductors  DANIEL GUPTA     PROM stretch  DANIEL GUPTA     Pat mobs                   Neuro Re-Ed  Ther Ex 7/19 8/1 8/4 8/8     SLR 2x10 ea 2x10 ea 3x10  3x10      S/l hip abd 2x10 ea 2x10 ea  3x10       Seated HS stretch :30x2 :30x3        Bridge  2x10  2x10  3x10      Calf stretch step  Wedge :30x3   :30x3     Heel slides   :10x10        SAQ   Light MRE 3x10   :30x3 Light MRE 3x10        LAQ    :30x3 MRE light  3x10  Light MRE 3x10      Seated knee flexion    Maroon 3x10               Ther Activity 7/19 8/1 8/4 8/8     Step ups    2" 10x caused increased pain      Lateral step down         Forward step down         U/l LP    seat8  50/ 3x10      U/l calf press    seat8  50/ 3x10      U/l heel raise    3x10 ea

## 2023-08-11 ENCOUNTER — OFFICE VISIT (OUTPATIENT)
Dept: PHYSICAL THERAPY | Facility: CLINIC | Age: 63
End: 2023-08-11
Payer: MEDICARE

## 2023-08-11 DIAGNOSIS — S83.242A OTHER TEAR OF MEDIAL MENISCUS OF LEFT KNEE AS CURRENT INJURY, INITIAL ENCOUNTER: Primary | ICD-10-CM

## 2023-08-11 DIAGNOSIS — S83.242A OTHER TEAR OF MEDIAL MENISCUS, CURRENT INJURY, LEFT KNEE, INITIAL ENCOUNTER: ICD-10-CM

## 2023-08-11 LAB

## 2023-08-11 PROCEDURE — 97110 THERAPEUTIC EXERCISES: CPT

## 2023-08-11 PROCEDURE — 97140 MANUAL THERAPY 1/> REGIONS: CPT

## 2023-08-11 PROCEDURE — 97530 THERAPEUTIC ACTIVITIES: CPT

## 2023-08-11 NOTE — PROGRESS NOTES
Daily Note     Today's date: 2023  Patient name: Henny Allan  : 1960  MRN: [id number]  Referring provider: Jerson Chapa,*  Dx:   Encounter Diagnosis     ICD-10-CM    1. Other tear of medial meniscus of left knee as current injury, initial encounter  S83.242A       2. Other tear of medial meniscus, current injury, left knee, initial encounter  S83.242A              Subjective: Nael Crowley reports on Wednesday he was walking his dog, dog quickly jerked at leash causing "tweak" in L knee. Pt notes increased soreness following     Objective: See treatment diary below    Assessment: Kishore tolerated PT treatment well. STM focusing along L quadriceps, ITB, and hamstrings to address tissue tone. End range tightness into flexion and limited hamstring flexibility with passive stretching. Challenged with closed and open chain strengthening. Pt denied L knee provocation throughout and post session. Pt would benefit from continued PT to further address impairments and maximize functional level. Plan: Continue per plan of care.       Precautions:   Past Medical History:   Diagnosis Date   • Accelerated essential hypertension    • Acquired spondylolisthesis    • Atypical chest pain    • CA of prostate (HCC)    • CPAP (continuous positive airway pressure) dependence    • De Quervain's tenosynovitis    • Epileptic seizure ([address])    • Esophageal reflux    • Fatigue    • Hematuria    • Hyperglycemia     last assessed: 3/28/2013   • Hyperlipidemia    • Impaired fasting glucose    • Lightheadedness    • Lower back pain    • Lumbar foraminal stenosis    • Lumbar spondylosis    • Malignant melanoma of skin (HCC)    • Microscopic hematuria     last assessed: 2014   • Obesity    • Plantar fasciitis    • Polyp of sigmoid colon    • PONV (postoperative nausea and vomiting)    • Prostate cancer ([address])     recevied radiation 2022 - 2022   • Renal cell carcinoma, right ([address])    • Right kidney mass    • Sleep apnea • Spinal stenosis    • Status post medial meniscectomy of right knee 07/21/2021       Manuals 7/19 8/1 8/4 8/8 8/11    STM ITB, HS, calf, adductors  JW ALONSO GUPTA JW    PROM stretch  JW ALONSO GUPTA JW    Pat mobs                   Neuro Re-Ed 7/19 8/1 8/4 8/8 8/11                                                                   Ther Ex 7/19 8/1 8/4 8/8 8/11    SLR 2x10 ea 2x10 ea 3x10  3x10  3x10     S/l hip abd 2x10 ea 2x10 ea  3x10       Seated HS stretch :30x2 :30x3        Bridge  2x10  2x10  3x10  3x10    Calf stretch step  Wedge :30x3   :30x3     Heel slides   :10x10        SAQ   Light MRE 3x10   :30x3 Light MRE 3x10    5# 3x10    LAQ    :30x3 MRE light  3x10  Light MRE 3x10  5# 3x10    Seated knee flexion    Maroon 3x10  Maroon 3x10             Ther Activity 7/19 8/1 8/4 8/8 8/11    Step ups    2" 10x caused increased pain      Lateral step down         Forward step down         U/l LP    seat8  50/ 3x10  seat8  50/ 3x10     U/l calf press    seat8  50/ 3x10  seat8  50/ 3x10     U/l heel raise    3x10 ea 3x10 ea     RB     Lvl 0 5'

## 2023-08-15 ENCOUNTER — OFFICE VISIT (OUTPATIENT)
Dept: PHYSICAL THERAPY | Facility: CLINIC | Age: 63
End: 2023-08-15
Payer: MEDICARE

## 2023-08-15 DIAGNOSIS — S83.242A OTHER TEAR OF MEDIAL MENISCUS OF LEFT KNEE AS CURRENT INJURY, INITIAL ENCOUNTER: Primary | ICD-10-CM

## 2023-08-15 DIAGNOSIS — S83.242A OTHER TEAR OF MEDIAL MENISCUS, CURRENT INJURY, LEFT KNEE, INITIAL ENCOUNTER: ICD-10-CM

## 2023-08-15 PROCEDURE — 97530 THERAPEUTIC ACTIVITIES: CPT

## 2023-08-15 PROCEDURE — 97140 MANUAL THERAPY 1/> REGIONS: CPT

## 2023-08-15 PROCEDURE — 97110 THERAPEUTIC EXERCISES: CPT

## 2023-08-15 NOTE — PROGRESS NOTES
Daily Note     Today's date: 8/15/2023  Patient name: Joy Hassan  : 1960  MRN: [id number]  Referring provider: Lisa Diaz,*  Dx:   Encounter Diagnosis     ICD-10-CM    1. Other tear of medial meniscus of left knee as current injury, initial encounter  S83.242A       2. Other tear of medial meniscus, current injury, left knee, initial encounter  S83.242A              Subjective: Sanchez López reports muscle soreness following last PT session, symptoms lasting one day following. Notes overall L knee pain/stiffness improving, continues to note significant strength deficit in LLE when going up/down stairs. Objective: See treatment diary below    Assessment: Kishore tolerated PT treatment well. Passive LLE stretching performed, knee ROM is WFL at this time but hamstring flexibility remains limited. STM focusing along L quadriceps, ITB, and hamstrings to address tissue tone. Pt remains challenged with current exercise program. Limited PF AROM observed with calf press and u/l HR d/t gastroc/soleus weakness. Reintroduced forward step up on 2" stair, pt able to perform w/o provocation today. Pt would benefit from continued PT to further address impairments and maximize functional level. Plan: Continue per plan of care.       Precautions:   Past Medical History:   Diagnosis Date   • Accelerated essential hypertension    • Acquired spondylolisthesis    • Atypical chest pain    • CA of prostate (HCC)    • CPAP (continuous positive airway pressure) dependence    • De Quervain's tenosynovitis    • Epileptic seizure ([address])    • Esophageal reflux    • Fatigue    • Hematuria    • Hyperglycemia     last assessed: 3/28/2013   • Hyperlipidemia    • Impaired fasting glucose    • Lightheadedness    • Lower back pain    • Lumbar foraminal stenosis    • Lumbar spondylosis    • Malignant melanoma of skin (HCC)    • Microscopic hematuria     last assessed: 2014   • Obesity    • Plantar fasciitis    • Polyp of sigmoid colon    • PONV (postoperative nausea and vomiting)    • Prostate cancer ([address])     recevied radiation 5/2022 - 9/2022   • Renal cell carcinoma, right (HCC)    • Right kidney mass    • Sleep apnea    • Spinal stenosis    • Status post medial meniscectomy of right knee 07/21/2021       Manuals 7/19 8/1 8/4 8/8 8/11 8/15   STM ITB, HS, calf, adductors  JW JZ JZ JW JW   PROM stretch  JW JZ JZ JW JW   Pat mobs                   Neuro Re-Ed 7/19 8/1 8/4 8/8 8/11 8/15                                                                  Ther Ex 7/19 8/1 8/4 8/8 8/11 8/15   SLR 2x10 ea 2x10 ea 3x10  3x10  3x10  3x10    S/l hip abd 2x10 ea 2x10 ea  3x10    2x10 ea    Seated HS stretch :30x2 :30x3        Bridge  2x10  2x10  3x10  3x10 3x10    Calf stretch step  Wedge :30x3   :30x3     Heel slides   :10x10        SAQ   Light MRE 3x10   :30x3 Light MRE 3x10    5# 3x10 5# 3x10    LAQ    :30x3 MRE light  3x10  Light MRE 3x10  5# 3x10 5# 3x10   Seated knee flexion    Maroon 3x10  Maroon 3x10 Maroon 3x10            Ther Activity 7/19 8/1 8/4 8/8 8/11 8/15   Step ups    2" 10x caused increased pain   2" 2x10 1 hha    Lateral step down         Forward step down         U/l LP    seat8  50/ 3x10  seat8  50/ 3x10  Seat 8  50/ 3x10    U/l calf press    seat8  50/ 3x10  seat8  50/ 3x10  Seat 8  50/ 3x10    U/l heel raise    3x10 ea 3x10 ea  3x10 ea    RB     Lvl 0 5' Lvl 0 5'

## 2023-08-18 ENCOUNTER — OFFICE VISIT (OUTPATIENT)
Dept: PHYSICAL THERAPY | Facility: CLINIC | Age: 63
End: 2023-08-18
Payer: MEDICARE

## 2023-08-18 DIAGNOSIS — S83.242A OTHER TEAR OF MEDIAL MENISCUS, CURRENT INJURY, LEFT KNEE, INITIAL ENCOUNTER: ICD-10-CM

## 2023-08-18 DIAGNOSIS — S83.242A OTHER TEAR OF MEDIAL MENISCUS OF LEFT KNEE AS CURRENT INJURY, INITIAL ENCOUNTER: Primary | ICD-10-CM

## 2023-08-18 PROCEDURE — 97110 THERAPEUTIC EXERCISES: CPT

## 2023-08-18 PROCEDURE — 97140 MANUAL THERAPY 1/> REGIONS: CPT

## 2023-08-18 PROCEDURE — 97530 THERAPEUTIC ACTIVITIES: CPT

## 2023-08-18 NOTE — PROGRESS NOTES
Daily Note     Today's date: 2023  Patient name: Sheron Garnett  : 1960  MRN: [id number]  Referring provider: Rocio Roca,*  Dx:   Encounter Diagnosis     ICD-10-CM    1. Other tear of medial meniscus of left knee as current injury, initial encounter  S83.242A       2. Other tear of medial meniscus, current injury, left knee, initial encounter  S83.242A              Subjective: Gardenia Lilly reports L knee felt "achy" following last PT session, resolved within the day. Pt went to beach yesterday, significant challenge and pain while walking through the sand. Objective: See treatment diary below    Assessment: Kishore tolerated PT treatment well. Continued with passive L knee stretching, end range tightness remaining into flexion. Tone improving throughout quadriceps. Advanced step height with forward step up, provocation of symptoms occurring after 10 repetitions. Limited AROM observed on LLE with u/l HR, modified to 2 up down 1 variation. Pt would benefit from continued PT to further address impairments and maximize functional level. Plan: Continue per plan of care.       Precautions:   Past Medical History:   Diagnosis Date   • Accelerated essential hypertension    • Acquired spondylolisthesis    • Atypical chest pain    • CA of prostate (HCC)    • CPAP (continuous positive airway pressure) dependence    • De Quervain's tenosynovitis    • Epileptic seizure ([address])    • Esophageal reflux    • Fatigue    • Hematuria    • Hyperglycemia     last assessed: 3/28/2013   • Hyperlipidemia    • Impaired fasting glucose    • Lightheadedness    • Lower back pain    • Lumbar foraminal stenosis    • Lumbar spondylosis    • Malignant melanoma of skin (HCC)    • Microscopic hematuria     last assessed: 2014   • Obesity    • Plantar fasciitis    • Polyp of sigmoid colon    • PONV (postoperative nausea and vomiting)    • Prostate cancer ([address])     recevied radiation 2022 - 2022   • Renal cell carcinoma, right [facility])    • Right kidney mass    • Sleep apnea    • Spinal stenosis    • Status post medial meniscectomy of right knee 07/21/2021       Manuals 8/18  8/4 8/8 8/11 8/15   STM ITB, HS, calf, adductors JW  JZ JZ JW JW   PROM stretch JW  JZ Holland Bass mobs                   Neuro Re-Ed 8/18  8/4 8/8 8/11 8/15                                                                  Ther Ex 8/18  8/4 8/8 8/11 8/15   SLR 3x10  3x10  3x10  3x10  3x10    S/l hip abd 3x10 ea  3x10    2x10 ea    Seated HS stretch         Bridge 3x10   2x10  3x10  3x10 3x10    Calf stretch step    :30x3     Heel slides          SAQ 5# 3x10   Light MRE 3x10   :30x3 Light MRE 3x10    5# 3x10 5# 3x10    LAQ 5# 3x10    :30x3 MRE light  3x10  Light MRE 3x10  5# 3x10 5# 3x10   Seated knee flexion Maroon 3x10   Maroon 3x10  Maroon 3x10 Maroon 3x10            Ther Activity 8/18  8/4 8/8 8/11 8/15   Step ups 2" x10   4" x10   hha x2    2" 10x caused increased pain   2" 2x10 1 hha    Lateral step down         Forward step down         U/l LP    seat8  50/ 3x10  seat8  50/ 3x10  Seat 8  50/ 3x10    U/l calf press    seat8  50/ 3x10  seat8  50/ 3x10  Seat 8  50/ 3x10    U/l heel raise 2 up down 1 3x10 ea    3x10 ea 3x10 ea  3x10 ea    RB Lvl 1 5'     Lvl 0 5' Lvl 0 5'

## 2023-08-19 DIAGNOSIS — I10 BENIGN ESSENTIAL HYPERTENSION: ICD-10-CM

## 2023-08-21 RX ORDER — AMLODIPINE BESYLATE 10 MG/1
TABLET ORAL
Qty: 90 TABLET | Refills: 3 | Status: SHIPPED | OUTPATIENT
Start: 2023-08-21

## 2023-08-22 ENCOUNTER — OFFICE VISIT (OUTPATIENT)
Dept: PHYSICAL THERAPY | Facility: CLINIC | Age: 63
End: 2023-08-22
Payer: MEDICARE

## 2023-08-22 DIAGNOSIS — S83.242A OTHER TEAR OF MEDIAL MENISCUS, CURRENT INJURY, LEFT KNEE, INITIAL ENCOUNTER: ICD-10-CM

## 2023-08-22 DIAGNOSIS — S83.242A OTHER TEAR OF MEDIAL MENISCUS OF LEFT KNEE AS CURRENT INJURY, INITIAL ENCOUNTER: Primary | ICD-10-CM

## 2023-08-22 PROCEDURE — 97140 MANUAL THERAPY 1/> REGIONS: CPT

## 2023-08-22 PROCEDURE — 97530 THERAPEUTIC ACTIVITIES: CPT

## 2023-08-22 PROCEDURE — 97110 THERAPEUTIC EXERCISES: CPT

## 2023-08-22 NOTE — PROGRESS NOTES
Daily Note     Today's date: 2023  Patient name: Nicole Ferrell  : 1960  MRN: [id number]  Referring provider: Krysten Gamboa,*  Dx:   Encounter Diagnosis     ICD-10-CM    1. Other tear of medial meniscus of left knee as current injury, initial encounter  S83.242A       2. Other tear of medial meniscus, current injury, left knee, initial encounter  S83.242A              Subjective: Simeon Boston reports L knee feels pretty good today with no complaints of pain. Notes that steps and static standing for longer periods of time tend to bother his knee. Objective: See treatment diary below    Assessment: Kishore tolerated PT treatment well. Pt demonstrated good flexibility in both directions of his L knee. Increased tenderness noted over distal adductor today with STM with increased discomfort present with L SL WBing. He was able to progress step ups today with tolerable pain but able to complete. Pt would benefit from continued PT to further address impairments and maximize functional level. Plan: Continue per plan of care.       Precautions:   Past Medical History:   Diagnosis Date   • Accelerated essential hypertension    • Acquired spondylolisthesis    • Atypical chest pain    • CA of prostate (HCC)    • CPAP (continuous positive airway pressure) dependence    • De Quervain's tenosynovitis    • Epileptic seizure ([address])    • Esophageal reflux    • Fatigue    • Hematuria    • Hyperglycemia     last assessed: 3/28/2013   • Hyperlipidemia    • Impaired fasting glucose    • Lightheadedness    • Lower back pain    • Lumbar foraminal stenosis    • Lumbar spondylosis    • Malignant melanoma of skin (HCC)    • Microscopic hematuria     last assessed: 2014   • Obesity    • Plantar fasciitis    • Polyp of sigmoid colon    • PONV (postoperative nausea and vomiting)    • Prostate cancer ([address])     recevied radiation 2022 - 2022   • Renal cell carcinoma, right ([address])    • Right kidney mass    • Sleep apnea • Spinal stenosis    • Status post medial meniscectomy of right knee 07/21/2021       Manuals 8/18 8/22 8/4 8/8 8/11 8/15   STM ITB, HS, calf, adductors JW MM JZ JZ JW JW   PROM stretch JW MM JZ Gilbert Solorio mobs                   Neuro Re-Ed 8/18 8/22 8/4 8/8 8/11 8/15                                                                  Ther Ex 8/18 8/22 8/4 8/8 8/11 8/15   SLR 3x10 3x10 3x10  3x10  3x10  3x10    S/l hip abd 3x10 ea 3x10 ea 3x10    2x10 ea    Seated HS stretch         Bridge 3x10  3x10 2x10  3x10  3x10 3x10    Calf stretch step    :30x3     Heel slides          SAQ 5# 3x10  5# 3x10 Light MRE 3x10   :30x3 Light MRE 3x10    5# 3x10 5# 3x10    LAQ 5# 3x10  5# 3x10  :30x3 MRE light  3x10  Light MRE 3x10  5# 3x10 5# 3x10   Seated knee flexion Maroon 3x10 Maroon 3x10  Maroon 3x10  Maroon 3x10 Maroon 3x10            Ther Activity 8/18 8/22 8/4 8/8 8/11 8/15   Step ups 2" x10   4" x10   hha x2  4" 2x10  2" 10x caused increased pain   2" 2x10 1 hha    Lateral step down         Forward step down         U/l LP  Seat 8  50/ 3x10   seat8  50/ 3x10  seat8  50/ 3x10  Seat 8  50/ 3x10    U/l calf press  Seat 8  50/ 3x10   seat8  50/ 3x10  seat8  50/ 3x10  Seat 8  50/ 3x10    U/l heel raise 2 up down 1 3x10 ea  2 up down 1 3x10 ea   3x10 ea 3x10 ea  3x10 ea    RB Lvl 1 5'  lvl 1 5'   Lvl 0 5' Lvl 0 5'

## 2023-08-25 ENCOUNTER — EVALUATION (OUTPATIENT)
Dept: PHYSICAL THERAPY | Facility: CLINIC | Age: 63
End: 2023-08-25
Payer: MEDICARE

## 2023-08-25 DIAGNOSIS — S83.242A OTHER TEAR OF MEDIAL MENISCUS, CURRENT INJURY, LEFT KNEE, INITIAL ENCOUNTER: Primary | ICD-10-CM

## 2023-08-25 DIAGNOSIS — S83.242A OTHER TEAR OF MEDIAL MENISCUS OF LEFT KNEE AS CURRENT INJURY, INITIAL ENCOUNTER: ICD-10-CM

## 2023-08-25 PROCEDURE — 97530 THERAPEUTIC ACTIVITIES: CPT | Performed by: PHYSICAL THERAPIST

## 2023-08-25 PROCEDURE — 97140 MANUAL THERAPY 1/> REGIONS: CPT | Performed by: PHYSICAL THERAPIST

## 2023-08-25 PROCEDURE — 97110 THERAPEUTIC EXERCISES: CPT | Performed by: PHYSICAL THERAPIST

## 2023-08-25 NOTE — PROGRESS NOTES
PT Re-Evaluation     Today's date: 2023  Patient name: Galina Morales  : 1960  MRN: [id number]  Referring provider: Jesús Cuevas,*  Dx:   Encounter Diagnosis     ICD-10-CM    1. Other tear of medial meniscus of left knee as current injury, initial encounter  S83.242A             Assessment  Assessment details:   Since beginning PT Kishore reports GROC improvement of 60%. MMT demonstrated improved strength. Goniometry demonstrated improved flexibility. Functional outcome measures and subjective reports demonstrated improved functional ability. Despite improvements he continues to presesent with pain, decreased strength, decreased ROM, decreased joint mobility, ambulatory dysfunction and balance dysfunction. Due to these impairments, patient has difficulty performing ADL's, recreational activities, engaging in social activities, stair negotiation, lifting/carrying, transfers. Patient's clinical presentation is consistent with their referring diagnosis of Left medial meniscal tear (pre op PT visit). Patient has been educated in post-op contraindications / precautions, gait training, weight bearing status, home exercise program and plan of care.  Patient would benefit from skilled physical therapy services to address their aforementioned functional limitations and progress towards prior level of function and independence with home exercise program.     Impairments: abnormal gait, abnormal or restricted ROM, activity intolerance, impaired balance, impaired physical strength, lacks appropriate home exercise program, pain with function, weight-bearing intolerance, poor posture  and poor body mechanics  Functional limitations: walk/stand, STS, stair negotiation, squat, in/out bed/car   Prognosis: fair  Prognosis details: + factors: high motivation levels  - factors: BMI, age, chronic pain, History of R menisectomy    Goals  Short Term Goals to be accomplished by 23: (1 month post op)  STG1: Pt will be I with HEP. Achieved. STG2: Pt will demo 0-120 degrees in knee AROM to improve gait. Achieved. STG3: Pt will demo 5/5 MMT strength in knee to improve stair negotiation. 50% Achieved. STG4: Pt will amb community distance without gait deviates due to pain. Achieved. Long Term Goals to be accomplished 10/11/23:  1. Pt will be able to perform STS without UE support with normal mechanics. 2. Pt will be able to go up/down stairs without hand rail and with reciprocal gait pattern. 3. Pt will be able to walk/stand for >2 hour to do house tasks and yard work without pain and normal mechanics. Plan  Plan details: HEP development, stretching, strengthening, A/AA/PROM, joint mobilizations, posture education, STM/MI as needed to reduce muscle tension, muscle reeducation, PLOC discussed and agreed upon with patient. Patient would benefit from: PT eval and skilled physical therapy  Planned modality interventions: cryotherapy, thermotherapy: hydrocollator packs and unattended electrical stimulation  Planned therapy interventions: manual therapy, neuromuscular re-education, self care, therapeutic activities, therapeutic exercise, home exercise program, patient education, joint mobilization, balance, strengthening, stretching and therapeutic training  Frequency: 2x week  Duration in weeks: 12  Plan of Care beginning date: 7/19/2023  Plan of Care expiration date: 10/11/2023  Treatment plan discussed with: patient    Subjective Evaluation    History of Present Illness  Mechanism of injury: Pt is a 71 y/o male who presents to PT for pre op  ARTHROSCOPIC MENISCECTOMY LATERAL /MEDIAL (Left: Knee) to be performed on 7/25/23. Patient has been seeing Dr. Shaina Briones since December. Has not had relief except of all non weight bearing. He received prescription for PT.      Patient Goals  Patient goals for therapy: increased motion, improved balance, decreased pain, decreased edema, increased strength and independence with ADLs/IADLs  Patient goal: walk/stand, STS, stair negotiation, squat, in/out bed/car  Pain  Current pain ratin  At best pain ratin  At worst pain ratin  Location: Left knee   Quality: tight, pulling and discomfort  Relieving factors: relaxation and rest  Aggravating factors: stair climbing, walking, standing, lifting and sitting    Treatments  Current treatment: physical therapy    Objective     Observations   Left Knee   Positive for edema. Tenderness   Left Knee   Tenderness in the ITB and pes anserinus. Active Range of Motion   Left Knee   Flexion: 130 degrees  Extension: 0 degrees     Right Knee   Flexion: WFL  Extension: WFL    Passive Range of Motion   Left Knee   Flexion: 130 degrees   Extension: 0 degrees     Right Knee   Flexion: WFL  Extension: WFL    Strength/Myotome Testing     Left Knee   Flexion: 4  Extension: 4    Right Knee   Flexion: 5  Extension: 5    Additional Strength Details  U/l heel raise:   R: 10 reps  L: 0 reps unable to perform through full ROM    Tests     Left Knee   Negative valgus stress test at 30 degrees and varus stress test at 30 degrees.      SLS:   R: 30, 30 sec  L: 30, 30 sec       Precautions:   Past Medical History:   Diagnosis Date   • Accelerated essential hypertension    • Acquired spondylolisthesis    • Atypical chest pain    • CA of prostate (HCC)    • CPAP (continuous positive airway pressure) dependence    • De Quervain's tenosynovitis    • Epileptic seizure ([address])    • Esophageal reflux    • Fatigue    • Hematuria    • Hyperglycemia     last assessed: 3/28/2013   • Hyperlipidemia    • Impaired fasting glucose    • Lightheadedness    • Lower back pain    • Lumbar foraminal stenosis    • Lumbar spondylosis    • Malignant melanoma of skin (HCC)    • Microscopic hematuria     last assessed: 2014   • Obesity    • Plantar fasciitis    • Polyp of sigmoid colon    • PONV (postoperative nausea and vomiting)    • Prostate cancer ([address])     recevied radiation 5/2022 - 9/2022   • Renal cell carcinoma, right (HCC)    • Right kidney mass    • Sleep apnea    • Spinal stenosis    • Status post medial meniscectomy of right knee 07/21/2021     Manuals 8/18 8/22 8/25  8/11 8/15   STM ITB, HS, calf, adductors JW MM JZ  JW JW   PROM stretch JW MM JZ  Everardo gray                   Neuro Re-Ed 8/18 8/22 8/25  8/11 8/15                                                                  Ther Ex 8/18 8/22 8/25  8/11 8/15   SLR 3x10 3x10 3x10   3x10  3x10    S/l hip abd 3x10 ea 3x10 ea    2x10 ea    Seated HS stretch         Bridge 3x10  3x10   3x10 3x10    Calf stretch step   :30x2       Heel slides          SAQ 5# 3x10  5# 3x10 MRE 3x10   5# 3x10 5# 3x10    LAQ 5# 3x10  5# 3x10 MRE 3x10   5# 3x10 5# 3x10   Seated knee flexion Maroon 3x10 Maroon 3x10 M 3x10   Maroon 3x10 Maroon 3x10            Ther Activity 8/18 8/22 8/25  8/11 8/15   Step ups 2" x10   4" x10   hha x2  4" 2x10 6" 3x10 ea   2" 2x10 1 hha    Lateral step down   2" 2x10 increased pain at end      Forward step down   Pain limited       U/l LP  Seat 8  50/ 3x10  seat6   70/ 3x10   seat8  50/ 3x10  Seat 8  50/ 3x10    U/l calf press  Seat 8  50/ 3x10  seat8  50/ 3x15  seat8  50/ 3x10  Seat 8  50/ 3x10    U/l heel raise 2 up down 1 3x10 ea  2 up down 1 3x10 ea    3x10 ea  3x10 ea    RB Lvl 1 5'  lvl 1 5' lvl3 5'   Lvl 0 5' Lvl 0 5'

## 2023-08-29 ENCOUNTER — OFFICE VISIT (OUTPATIENT)
Dept: PHYSICAL THERAPY | Facility: CLINIC | Age: 63
End: 2023-08-29
Payer: MEDICARE

## 2023-08-29 DIAGNOSIS — S83.242A OTHER TEAR OF MEDIAL MENISCUS OF LEFT KNEE AS CURRENT INJURY, INITIAL ENCOUNTER: ICD-10-CM

## 2023-08-29 DIAGNOSIS — S83.242A OTHER TEAR OF MEDIAL MENISCUS, CURRENT INJURY, LEFT KNEE, INITIAL ENCOUNTER: Primary | ICD-10-CM

## 2023-08-29 PROCEDURE — 97140 MANUAL THERAPY 1/> REGIONS: CPT

## 2023-08-29 PROCEDURE — 97110 THERAPEUTIC EXERCISES: CPT

## 2023-08-29 PROCEDURE — 97530 THERAPEUTIC ACTIVITIES: CPT

## 2023-08-29 NOTE — PROGRESS NOTES
Daily Note     Today's date: 2023  Patient name: Audie Lau  : 1960  MRN: [id number]  Referring provider: Fahad Bailey,Fahad  Dx:   Encounter Diagnosis     ICD-10-CM    1. Other tear of medial meniscus, current injury, left knee, initial encounter  S83.242A       2. Other tear of medial meniscus of left knee as current injury, initial encounter  S83.242A                      Subjective: Dandy Gupta reports muscle soreness after last PT session, lasting 1-2 days following. Notes his knee "locked up" twice while working out in his barn, improved with self stretching. Continues to have greatest challenge with steps at home. Objective: See treatment diary below      Assessment: Kishore tolerated PT treatment well. STM focusing along distal quad to address tissue tone. Knee ROM is WFL at this time, some end range tightness remaining into flexion. Completed open chain strengthening with little challenge, advanced ankle weight. Continued with stair navigation, less apprehension and discomfort with ascending. Greats challenge with stair descending, however was able to complete forward and lateral step down from 2" step w/o provocation. Pt would benefit from continued PT to further address impairments and maximize functional level. Plan: Continue per plan of care.       Precautions:   Past Medical History:   Diagnosis Date   • Accelerated essential hypertension    • Acquired spondylolisthesis    • Atypical chest pain    • CA of prostate (HCC)    • CPAP (continuous positive airway pressure) dependence    • De Quervain's tenosynovitis    • Epileptic seizure ([address])    • Esophageal reflux    • Fatigue    • Hematuria    • Hyperglycemia     last assessed: 3/28/2013   • Hyperlipidemia    • Impaired fasting glucose    • Lightheadedness    • Lower back pain    • Lumbar foraminal stenosis    • Lumbar spondylosis    • Malignant melanoma of skin (HCC)    • Microscopic hematuria     last assessed: 2014   • Obesity    • Plantar fasciitis    • Polyp of sigmoid colon    • PONV (postoperative nausea and vomiting)    • Prostate cancer ([address])     recevied radiation 5/2022 - 9/2022   • Renal cell carcinoma, right (HCC)    • Right kidney mass    • Sleep apnea    • Spinal stenosis    • Status post medial meniscectomy of right knee 07/21/2021       Manuals 8/18 8/22 8/25 8/29  8/15   STM ITB, HS, calf, adductors JW MM JZ JW  JW   PROM stretch JW MM JZ Elizabeth gray                   Neuro Re-Ed 8/18 8/22 8/25 8/29  8/15                                                                  Ther Ex 8/18 8/22 8/25 8/29  8/15   SLR 3x10 3x10 3x10  3x10  3x10    S/l hip abd 3x10 ea 3x10 ea    2x10 ea    Seated HS stretch         Bridge 3x10  3x10    3x10    Calf stretch step   :30x2  :30x3      Heel slides          SAQ 5# 3x10  5# 3x10 MRE 3x10  8" 3x10   5# 3x10    LAQ 5# 3x10  5# 3x10 MRE 3x10  8# 3x10  5# 3x10   Seated knee flexion Maroon 3x10 Maroon 3x10 M 3x10    Maroon 3x10            Ther Activity 8/18 8/22 8/25 8/29  8/15   Step ups 2" x10   4" x10   hha x2  4" 2x10 6" 3x10 ea 6" 3x10 ea  2" 2x10 1 hha    Lateral step down   2" 2x10 increased pain at end 2" 2x10      Forward step down   Pain limited  2" x10      U/l LP  Seat 8  50/ 3x10  seat6   70/ 3x10  seat6   70/ 3x10   Seat 8  50/ 3x10    U/l calf press  Seat 8  50/ 3x10  seat8  50/ 3x15 seat8  50/ 3x10  Seat 8  50/ 3x10    U/l heel raise 2 up down 1 3x10 ea  2 up down 1 3x10 ea     3x10 ea    RB Lvl 1 5'  lvl 1 5' lvl3 5'  lvl3 5'   Lvl 0 5'

## 2023-09-01 ENCOUNTER — OFFICE VISIT (OUTPATIENT)
Dept: PHYSICAL THERAPY | Facility: CLINIC | Age: 63
End: 2023-09-01
Payer: MEDICARE

## 2023-09-01 DIAGNOSIS — S83.242A OTHER TEAR OF MEDIAL MENISCUS, CURRENT INJURY, LEFT KNEE, INITIAL ENCOUNTER: Primary | ICD-10-CM

## 2023-09-01 DIAGNOSIS — S83.242A OTHER TEAR OF MEDIAL MENISCUS OF LEFT KNEE AS CURRENT INJURY, INITIAL ENCOUNTER: ICD-10-CM

## 2023-09-01 PROCEDURE — 97530 THERAPEUTIC ACTIVITIES: CPT | Performed by: PHYSICAL THERAPIST

## 2023-09-01 PROCEDURE — 97110 THERAPEUTIC EXERCISES: CPT | Performed by: PHYSICAL THERAPIST

## 2023-09-01 PROCEDURE — 97140 MANUAL THERAPY 1/> REGIONS: CPT | Performed by: PHYSICAL THERAPIST

## 2023-09-01 NOTE — PROGRESS NOTES
Daily Note     Today's date: 2023  Patient name: Jackelin Cancino  : 1960  MRN: [id number]  Referring provider: Jason Muller,*  Dx:   Encounter Diagnosis     ICD-10-CM    1. Other tear of medial meniscus, current injury, left knee, initial encounter  S83.242A       2. Other tear of medial meniscus of left knee as current injury, initial encounter  S83.242A              Subjective: Pt reported that he was splitting firewood this week, which increased his pain. Objective: See treatment diary below    Assessment: Tolerated treatment well. Patient demonstrated fatigue post treatment, exhibited good technique with therapeutic exercises and would benefit from continued PT. Plan: Continue per plan of care.       Precautions:   Past Medical History:   Diagnosis Date   • Accelerated essential hypertension    • Acquired spondylolisthesis    • Atypical chest pain    • CA of prostate (HCC)    • CPAP (continuous positive airway pressure) dependence    • De Quervain's tenosynovitis    • Epileptic seizure ([address])    • Esophageal reflux    • Fatigue    • Hematuria    • Hyperglycemia     last assessed: 3/28/2013   • Hyperlipidemia    • Impaired fasting glucose    • Lightheadedness    • Lower back pain    • Lumbar foraminal stenosis    • Lumbar spondylosis    • Malignant melanoma of skin (HCC)    • Microscopic hematuria     last assessed: 2014   • Obesity    • Plantar fasciitis    • Polyp of sigmoid colon    • PONV (postoperative nausea and vomiting)    • Prostate cancer ([address])     recevied radiation 2022 - 2022   • Renal cell carcinoma, right (HCC)    • Right kidney mass    • Sleep apnea    • Spinal stenosis    • Status post medial meniscectomy of right knee 2021       Manuals     STM ITB, HS, calf, adductors JW MM JZ JW JZ    PROM stretch JW MM JZ JW JZ    Pat mobs                   Neuro Re-Ed  Ther Ex 8/18 8/22 8/25 8/29 9/1    SLR 3x10 3x10 3x10  3x10 2x10     S/l hip abd 3x10 ea 3x10 ea       Seated HS stretch         Bridge 3x10  3x10       Calf stretch step   :30x2  :30x3  :30x3 ea    Seated knee flexion     2M 3x10     SAQ 5# 3x10  5# 3x10 MRE 3x10  8" 3x10  3x10    LAQ 5# 3x10  5# 3x10 MRE 3x10  8# 3x10 :30x3  3x10    Seated knee flexion Maroon 3x10 Maroon 3x10 M 3x10                Ther Activity 8/18 8/22 8/25 8/29 9/1    Step ups 2" x10   4" x10   hha x2  4" 2x10 6" 3x10 ea 6" 3x10 ea 6" 2x10 ea    Lateral step down   2" 2x10 increased pain at end 2" 2x10  2" x10 ea held due to pain    Forward step down   Pain limited  2" x10      U/l LP  Seat 8  50/ 3x10  seat6   70/ 3x10  seat6   70/ 3x10  seat6   70/ 3x10 ea    U/l calf press  Seat 8  50/ 3x10  seat8  50/ 3x15 seat8  50/ 3x10 seat8   70/ 3x10 ea    U/l heel raise 2 up down 1 3x10 ea  2 up down 1 3x10 ea        RB Lvl 1 5'  lvl 1 5' lvl3 5'  lvl3 5'  lvl3 5'

## 2023-09-05 ENCOUNTER — OFFICE VISIT (OUTPATIENT)
Dept: PHYSICAL THERAPY | Facility: CLINIC | Age: 63
End: 2023-09-05
Payer: MEDICARE

## 2023-09-05 DIAGNOSIS — S83.242A OTHER TEAR OF MEDIAL MENISCUS OF LEFT KNEE AS CURRENT INJURY, INITIAL ENCOUNTER: ICD-10-CM

## 2023-09-05 DIAGNOSIS — S83.242A OTHER TEAR OF MEDIAL MENISCUS, CURRENT INJURY, LEFT KNEE, INITIAL ENCOUNTER: Primary | ICD-10-CM

## 2023-09-05 PROCEDURE — 97110 THERAPEUTIC EXERCISES: CPT

## 2023-09-05 PROCEDURE — 97530 THERAPEUTIC ACTIVITIES: CPT

## 2023-09-05 NOTE — PROGRESS NOTES
Daily Note     Today's date: 2023  Patient name: Gabe Rothman  : 1960  MRN: [id number]  Referring provider: Yvette Carmen,*  Dx:   Encounter Diagnosis     ICD-10-CM    1. Other tear of medial meniscus, current injury, left knee, initial encounter  S83.242A       2. Other tear of medial meniscus of left knee as current injury, initial encounter  S83.242A              Subjective: [place] reports increased L knee soreness following last PT session, lasting 2-3 days following. Pt notes stairs at home are becoming easier. Objective: See treatment diary below    Assessment: Kishore tolerated PT treatment well. Pt was able to complete 3 full sets of forward step down without L knee provocation today. Eccentric quad control improving. Advanced weight with u/l leg press activities, pt with some challenge. He would benefit from continued PT to further address impairments and maximize functional level. Plan: Continue per plan of care.       Precautions:   Past Medical History:   Diagnosis Date   • Accelerated essential hypertension    • Acquired spondylolisthesis    • Atypical chest pain    • CA of prostate (HCC)    • CPAP (continuous positive airway pressure) dependence    • De Quervain's tenosynovitis    • Epileptic seizure ([address])    • Esophageal reflux    • Fatigue    • Hematuria    • Hyperglycemia     last assessed: 3/28/2013   • Hyperlipidemia    • Impaired fasting glucose    • Lightheadedness    • Lower back pain    • Lumbar foraminal stenosis    • Lumbar spondylosis    • Malignant melanoma of skin (HCC)    • Microscopic hematuria     last assessed: 2014   • Obesity    • Plantar fasciitis    • Polyp of sigmoid colon    • PONV (postoperative nausea and vomiting)    • Prostate cancer ([address])     recevied radiation 2022 - 2022   • Renal cell carcinoma, right (HCC)    • Right kidney mass    • Sleep apnea    • Spinal stenosis    • Status post medial meniscectomy of right knee 2021 Manuals 8/18 8/22 8/25 8/29 9/1 9/5   STM ITB, HS, calf, adductors JW MM JZ JW JZ    PROM stretch JW MM JZ JW JZ JW   Pat mobs                   Neuro Re-Ed 8/18 8/22 8/25 8/29 9/1 9/5                                                                  Ther Ex 8/18 8/22 8/25 8/29 9/1 9/5   SLR 3x10 3x10 3x10  3x10 2x10  3x10   S/l hip abd 3x10 ea 3x10 ea       Seated HS stretch         Bridge 3x10  3x10    3x10    Calf stretch step   :30x2  :30x3  :30x3 ea    Seated knee flexion     2M 3x10     SAQ 5# 3x10  5# 3x10 MRE 3x10  8" 3x10  3x10 8# 3x10   LAQ 5# 3x10  5# 3x10 MRE 3x10  8# 3x10 :30x3  3x10 8# 3x10   Seated knee flexion Maroon 3x10 Maroon 3x10 M 3x10                Ther Activity 8/18 8/22 8/25 8/29 9/1 9/5   Step ups 2" x10   4" x10   hha x2  4" 2x10 6" 3x10 ea 6" 3x10 ea 6" 2x10 ea 6" 3x10 ea    Lateral step down   2" 2x10 increased pain at end 2" 2x10  2" x10 ea held due to pain 2" 3x10    Forward step down   Pain limited  2" x10      U/l LP  Seat 8  50/ 3x10  seat6   70/ 3x10  seat6   70/ 3x10  seat6   70/ 3x10 ea seat6   80/ 2x10 ea  90/ x10    U/l calf press  Seat 8  50/ 3x10  seat8  50/ 3x15 seat8  50/ 3x10 seat8   70/ 3x10 ea seat8  50/ 3x10   U/l heel raise 2 up down 1 3x10 ea  2 up down 1 3x10 ea        RB Lvl 1 5'  lvl 1 5' lvl3 5'  lvl3 5'  lvl3 5'  lvl3 5'

## 2023-09-08 ENCOUNTER — OFFICE VISIT (OUTPATIENT)
Dept: OBGYN CLINIC | Facility: MEDICAL CENTER | Age: 63
End: 2023-09-08

## 2023-09-08 ENCOUNTER — APPOINTMENT (OUTPATIENT)
Dept: PHYSICAL THERAPY | Facility: CLINIC | Age: 63
End: 2023-09-08
Payer: MEDICARE

## 2023-09-08 VITALS
HEIGHT: 67 IN | SYSTOLIC BLOOD PRESSURE: 143 MMHG | BODY MASS INDEX: 38.58 KG/M2 | HEART RATE: 54 BPM | WEIGHT: 245.8 LBS | DIASTOLIC BLOOD PRESSURE: 84 MMHG

## 2023-09-08 DIAGNOSIS — Z98.890 S/P MEDIAL MENISCECTOMY OF LEFT KNEE: Primary | ICD-10-CM

## 2023-09-08 DIAGNOSIS — S83.242A OTHER TEAR OF MEDIAL MENISCUS, CURRENT INJURY, LEFT KNEE, INITIAL ENCOUNTER: ICD-10-CM

## 2023-09-08 PROCEDURE — 99024 POSTOP FOLLOW-UP VISIT: CPT | Performed by: ORTHOPAEDIC SURGERY

## 2023-09-08 NOTE — PROGRESS NOTES
Knee Post Operative Visit     Assesment:     Surgical Arthroscopy of the left knee with medial meniscectomy 7/25/23    Plan:  Continue Physical Therapy for improved strengthening  F/U PRN . The patient can follow up as needed and is welcome to return at any point with any new or old issue. Imaging:    No images reviewed today    Weight bearing:  as tolerated     ROM:  Full    Brace:  No brace needed    DVT Prophylaxis:  Ambulation    Follow up: PRN    Patient was advised that if they have any fevers, chills, chest pain, shortness of breath, redness or drainage from the incision, please let our office know immediately. History of Present Illness: The patient is a 70 y.o. male who is being evaluated post operatively 6  week  status post arthroscopic partial medial meniscectomy 7/25/23. Since the prior visit, He reports significant improvement. No complaints with any activities    Pain is well controlled. Denies swelling  They have started physical therapy. The patient has been ambulating without crutches. The patient has been ambulating without a brace. The patient denies any fevers, chills, calf pain, chest pain/shortness of breath, redness or drainage from the incision. I have reviewed the past medical, surgical, social and family history, medications and allergies as documented in the EMR. Review of systems: ROS is negative other than that noted in the HPI. Constitutional: Negative for fatigue and fever. Physical Exam:    Blood pressure 143/84, pulse (!) 54, height 5' 7" (1.702 m), weight 111 kg (245 lb 12.8 oz).     General/Constitutional: NAD, well developed, well nourished  HENT: Normocephalic, atraumatic  CV: Intact distal pulses, regular rate  Resp: No respiratory distress or labored breathing  Lymphatic: No lymphadenopathy palpated  Neuro: Alert and Oriented x 3, no focal deficits  Psych: Normal mood, normal affect, normal judgement, normal behavior  Skin: Warm, dry, no rashes, no erythema       Knee Exam (focused):                   RIGHT LEFT   ROM:   0-130 0-130   Palpation: Effusion negative mild     MJL tenderness Negative Positive     LJL tenderness Negative Negative   Instability: Varus stable stable     Valgus stable stable   Special Tests: Lachman Negative Negative     Posterior drawer Negative Negative     Anterior drawer Negative Negative     Pivot shift not tested not tested     Dial not tested not tested   Patella: Palpation no tenderness no tenderness     Mobility 1/4 1/4     Apprehension Negative Negative   Other: Single leg 1/4 squat not tested not tested      Incisions show no erythema, no drainage    LE NV Exam: +2 DP/PT pulses bilaterally  Sensation intact to light touch L2-S1 bilaterally     Bilateral hip ROM demonstrates no pain actively or passively    No calf tenderness to palpation bilaterally      Scribe Attestation    I,:  Roshni Malhotra am acting as a scribe while in the presence of the attending physician.:       I,:  Deon Bonilla DO personally performed the services described in this documentation    as scribed in my presence.:

## 2023-09-12 ENCOUNTER — TELEPHONE (OUTPATIENT)
Dept: FAMILY MEDICINE CLINIC | Facility: CLINIC | Age: 63
End: 2023-09-12

## 2023-09-12 ENCOUNTER — OFFICE VISIT (OUTPATIENT)
Dept: PHYSICAL THERAPY | Facility: CLINIC | Age: 63
End: 2023-09-12
Payer: MEDICARE

## 2023-09-12 DIAGNOSIS — S83.242A OTHER TEAR OF MEDIAL MENISCUS, CURRENT INJURY, LEFT KNEE, INITIAL ENCOUNTER: Primary | ICD-10-CM

## 2023-09-12 DIAGNOSIS — S83.242A OTHER TEAR OF MEDIAL MENISCUS OF LEFT KNEE AS CURRENT INJURY, INITIAL ENCOUNTER: ICD-10-CM

## 2023-09-12 LAB
ALBUMIN SERPL-MCNC: 4.5 G/DL (ref 3.9–4.9)
ALBUMIN/GLOB SERPL: 1.8 {RATIO} (ref 1.2–2.2)
ALP SERPL-CCNC: 111 IU/L (ref 44–121)
ALT SERPL-CCNC: 15 IU/L (ref 0–44)
AST SERPL-CCNC: 15 IU/L (ref 0–40)
BASOPHILS # BLD AUTO: 0 X10E3/UL (ref 0–0.2)
BASOPHILS NFR BLD AUTO: 1 %
BILIRUB SERPL-MCNC: <0.2 MG/DL (ref 0–1.2)
BUN SERPL-MCNC: 15 MG/DL (ref 8–27)
BUN/CREAT SERPL: 12 (ref 10–24)
CALCIUM SERPL-MCNC: 9.6 MG/DL (ref 8.6–10.2)
CHLORIDE SERPL-SCNC: 107 MMOL/L (ref 96–106)
CHOLEST SERPL-MCNC: 288 MG/DL (ref 100–199)
CO2 SERPL-SCNC: 23 MMOL/L (ref 20–29)
CREAT SERPL-MCNC: 1.21 MG/DL (ref 0.76–1.27)
EGFR: 66 ML/MIN/1.73
EOSINOPHIL # BLD AUTO: 0.2 X10E3/UL (ref 0–0.4)
EOSINOPHIL NFR BLD AUTO: 3 %
ERYTHROCYTE [DISTWIDTH] IN BLOOD BY AUTOMATED COUNT: 13.8 % (ref 11.6–15.4)
GLOBULIN SER-MCNC: 2.5 G/DL (ref 1.5–4.5)
GLUCOSE SERPL-MCNC: 105 MG/DL (ref 70–99)
HCT VFR BLD AUTO: 42.2 % (ref 37.5–51)
HDLC SERPL-MCNC: 50 MG/DL
HGB BLD-MCNC: 14.5 G/DL (ref 13–17.7)
IMM GRANULOCYTES # BLD: 0 X10E3/UL (ref 0–0.1)
IMM GRANULOCYTES NFR BLD: 0 %
LDLC SERPL CALC-MCNC: 182 MG/DL (ref 0–99)
LDLC/HDLC SERPL: 3.6 RATIO (ref 0–3.6)
LYMPHOCYTES # BLD AUTO: 1.3 X10E3/UL (ref 0.7–3.1)
LYMPHOCYTES NFR BLD AUTO: 23 %
MCH RBC QN AUTO: 30.9 PG (ref 26.6–33)
MCHC RBC AUTO-ENTMCNC: 34.4 G/DL (ref 31.5–35.7)
MCV RBC AUTO: 90 FL (ref 79–97)
MONOCYTES # BLD AUTO: 0.5 X10E3/UL (ref 0.1–0.9)
MONOCYTES NFR BLD AUTO: 8 %
NEUTROPHILS # BLD AUTO: 3.8 X10E3/UL (ref 1.4–7)
NEUTROPHILS NFR BLD AUTO: 65 %
PLATELET # BLD AUTO: 233 X10E3/UL (ref 150–450)
POTASSIUM SERPL-SCNC: 5 MMOL/L (ref 3.5–5.2)
PROT SERPL-MCNC: 7 G/DL (ref 6–8.5)
PSA FREE MFR SERPL: NORMAL %
PSA FREE SERPL-MCNC: <0.02 NG/ML
PSA SERPL-MCNC: <0.1 NG/ML (ref 0–4)
RBC # BLD AUTO: 4.7 X10E6/UL (ref 4.14–5.8)
SL AMB VLDL CHOLESTEROL CALC: 56 MG/DL (ref 5–40)
SODIUM SERPL-SCNC: 143 MMOL/L (ref 134–144)
TRIGL SERPL-MCNC: 291 MG/DL (ref 0–149)
TSH SERPL DL<=0.005 MIU/L-ACNC: 1.33 UIU/ML (ref 0.45–4.5)
WBC # BLD AUTO: 5.8 X10E3/UL (ref 3.4–10.8)

## 2023-09-12 PROCEDURE — 97110 THERAPEUTIC EXERCISES: CPT

## 2023-09-12 PROCEDURE — 97140 MANUAL THERAPY 1/> REGIONS: CPT

## 2023-09-12 PROCEDURE — 97530 THERAPEUTIC ACTIVITIES: CPT

## 2023-09-12 NOTE — PROGRESS NOTES
Daily Note     Today's date: 2023  Patient name: Valdo Vazquez  : 1960  MRN: [id number]  Referring provider: Ada Maldonado,*  Dx:   Encounter Diagnosis     ICD-10-CM    1. Other tear of medial meniscus, current injury, left knee, initial encounter  S83.242A       2. Other tear of medial meniscus of left knee as current injury, initial encounter  S83.242A              Subjective: Pradeep Damico denies  L knee soreness following last PT session. Overall feeling stronger and able to complete majority of daily activities without limitation or pain. Notes stairs are still most challenging, particularly going up. Notes he feels ready to discharge NV. Objective: See treatment diary below    Assessment: Kishore tolerated PT treatment well. Improved tissue mobility palpated throughout L distal qaud with STM. End range tightness remains into flexion, improving with passive stretching. Able to advance step height with forward step up, fatiguing after 1 set. Completed forward and lateral stair descending without provocation. Step height limited d/t remaining quad weakness. Pt to be D/C HEP NV. Plan: Continue per plan of care.       Precautions:   Past Medical History:   Diagnosis Date   • Accelerated essential hypertension    • Acquired spondylolisthesis    • Atypical chest pain    • CA of prostate (HCC)    • CPAP (continuous positive airway pressure) dependence    • De Quervain's tenosynovitis    • Epileptic seizure ([address])    • Esophageal reflux    • Fatigue    • Hematuria    • Hyperglycemia     last assessed: 3/28/2013   • Hyperlipidemia    • Impaired fasting glucose    • Lightheadedness    • Lower back pain    • Lumbar foraminal stenosis    • Lumbar spondylosis    • Malignant melanoma of skin (HCC)    • Microscopic hematuria     last assessed: 2014   • Obesity    • Plantar fasciitis    • Polyp of sigmoid colon    • PONV (postoperative nausea and vomiting)    • Prostate cancer [facility])     recevied radiation 5/2022 - 9/2022   • Renal cell carcinoma, right (HCC)    • Right kidney mass    • Sleep apnea    • Spinal stenosis    • Status post medial meniscectomy of right knee 07/21/2021       Manuals 9/12 8/25 8/29 9/1 9/5   STM ITB, HS, calf, adductors JW  JZ JW JZ    PROM stretch JW  JZ JW JZ JW   Pat mobs                   Neuro Re-Ed 9/12 8/25 8/29 9/1 9/5                                                                  Ther Ex 9/12 8/25 8/29 9/1 9/5   SLR 3x10   3x10  3x10 2x10  3x10   S/l hip abd         Seated HS stretch         Bridge      3x10    Calf stretch step   :30x2  :30x3  :30x3 ea    Seated knee flexion     2M 3x10     SAQ 8# 3x10  MRE 3x10  8" 3x10  3x10 8# 3x10   LAQ 8# 3x10   MRE 3x10  8# 3x10 :30x3  3x10 8# 3x10   Seated knee flexion   M 3x10                Ther Activity 9/12 8/25 8/29 9/1 9/5   Step ups 6" 3x10   8" x10   6" 3x10 ea 6" 3x10 ea 6" 2x10 ea 6" 3x10 ea    Lateral step down 2" 3x10   2" 2x10 increased pain at end 2" 2x10  2" x10 ea held due to pain 2" 3x10    Forward step down 2" 3x10   Pain limited  2" x10      U/l LP seat6   80/ 2x10 ea  90/ x10   seat6   70/ 3x10  seat6   70/ 3x10  seat6   70/ 3x10 ea seat6   80/ 2x10 ea  90/ x10    U/l calf press seat8  50/ 3x10 ea  seat8  50/ 3x15 seat8  50/ 3x10 seat8   70/ 3x10 ea seat8  50/ 3x10   U/l heel raise 3x10         RB Lvl 3 5'   lvl3 5'  lvl3 5'  lvl3 5'  lvl3 5'

## 2023-09-12 NOTE — TELEPHONE ENCOUNTER
----- Message from Gina Luis DO sent at 9/12/2023  4:14 PM EDT -----  Sugar and cholesterol elevated.   Discuss at next ov

## 2023-09-15 ENCOUNTER — OFFICE VISIT (OUTPATIENT)
Dept: PHYSICAL THERAPY | Facility: CLINIC | Age: 63
End: 2023-09-15
Payer: MEDICARE

## 2023-09-15 DIAGNOSIS — S83.242A OTHER TEAR OF MEDIAL MENISCUS OF LEFT KNEE AS CURRENT INJURY, INITIAL ENCOUNTER: ICD-10-CM

## 2023-09-15 DIAGNOSIS — S83.242A OTHER TEAR OF MEDIAL MENISCUS, CURRENT INJURY, LEFT KNEE, INITIAL ENCOUNTER: Primary | ICD-10-CM

## 2023-09-15 PROCEDURE — 97140 MANUAL THERAPY 1/> REGIONS: CPT

## 2023-09-15 PROCEDURE — 97110 THERAPEUTIC EXERCISES: CPT

## 2023-09-15 PROCEDURE — 97530 THERAPEUTIC ACTIVITIES: CPT

## 2023-09-15 NOTE — PROGRESS NOTES
Daily Note     Today's date: 9/15/2023  Patient name: Leon Rosales  : 1960  MRN: [id number]  Referring provider: Jude Carlson,*  Dx:   Encounter Diagnosis     ICD-10-CM    1. Other tear of medial meniscus, current injury, left knee, initial encounter  S83.242A       2. Other tear of medial meniscus of left knee as current injury, initial encounter  S83.242A           Start Time: 56  Stop Time: 200  Total time in clinic (min): 45 minutes    Subjective: Patient states that he goes away to [place] for 12 days. Objective: See treatment diary below      Assessment: Tolerated treatment well. Patient demonstrated improved soft tissue extensibility this session. Patient demonstrated fatigue post treatment and would benefit from continued PT      Plan: Continue per plan of care.       Precautions:   Past Medical History:   Diagnosis Date   • Accelerated essential hypertension    • Acquired spondylolisthesis    • Atypical chest pain    • CA of prostate (HCC)    • CPAP (continuous positive airway pressure) dependence    • De Quervain's tenosynovitis    • Epileptic seizure ([address])    • Esophageal reflux    • Fatigue    • Hematuria    • Hyperglycemia     last assessed: 3/28/2013   • Hyperlipidemia    • Impaired fasting glucose    • Lightheadedness    • Lower back pain    • Lumbar foraminal stenosis    • Lumbar spondylosis    • Malignant melanoma of skin (HCC)    • Microscopic hematuria     last assessed: 2014   • Obesity    • Plantar fasciitis    • Polyp of sigmoid colon    • PONV (postoperative nausea and vomiting)    • Prostate cancer [facility])     recevied radiation 2022 - 2022   • Renal cell carcinoma, right (HCC)    • Right kidney mass    • Sleep apnea    • Spinal stenosis    • Status post medial meniscectomy of right knee 2021       Manuals 9/12 9/15 8/25 8/29 9/1 9/5   STM ITB, HS, calf, adductors JW LQ JZ JW JZ    PROM stretch JW LQ JZ JW JMAURICE JW   Pat mobs                   Neuro Re-Ed 9/12 9/15 8/25 8/29 9/1 9/5                                                                  Ther Ex 9/12 9/15 8/25 8/29 9/1 9/5   SLR 3x10  3x10  3x10  3x10 2x10  3x10   S/l hip abd         Seated HS stretch         Bridge      3x10    Calf stretch step   :30x2  :30x3  :30x3 ea    Seated knee flexion     2M 3x10     SAQ 8# 3x10 8# 3x10 MRE 3x10  8" 3x10  3x10 8# 3x10   LAQ 8# 3x10  8# 3x10  MRE 3x10  8# 3x10 :30x3  3x10 8# 3x10   Seated knee flexion   M 3x10                Ther Activity 9/12 9/15 8/25 8/29 9/1 9/5   Step ups 6" 3x10   8" x10  6" 3x10   8" x10  6" 3x10 ea 6" 3x10 ea 6" 2x10 ea 6" 3x10 ea    Lateral step down 2" 3x10  2" 3x10  2" 2x10 increased pain at end 2" 2x10  2" x10 ea held due to pain 2" 3x10    Forward step down 2" 3x10  2" 3x10  Pain limited  2" x10      U/l LP seat6   80/ 2x10 ea  90/ x10  seat6   80/ 3x10 ea  90/ x10  seat6   70/ 3x10  seat6   70/ 3x10  seat6   70/ 3x10 ea seat6   80/ 2x10 ea  90/ x10    U/l calf press seat8  50/ 3x10 ea seat8  50/ 3x10 ea seat8  50/ 3x15 seat8  50/ 3x10 seat8   70/ 3x10 ea seat8  50/ 3x10   U/l heel raise 3x10  missed       RB Lvl 3 5'  Lvl 3 5'  lvl3 5'  lvl3 5'  lvl3 5'  lvl3 5'

## 2023-09-27 ENCOUNTER — RA CDI HCC (OUTPATIENT)
Dept: OTHER | Facility: HOSPITAL | Age: 63
End: 2023-09-27

## 2023-09-27 NOTE — PROGRESS NOTES
[address] [facility] coding opportunities       Chart reviewed, no opportunity found: CHART REVIEWED, NO OPPORTUNITY FOUND        Patients Insurance     Medicare Insurance: Medicare

## 2023-11-01 ENCOUNTER — OFFICE VISIT (OUTPATIENT)
Dept: FAMILY MEDICINE CLINIC | Facility: CLINIC | Age: 63
End: 2023-11-01
Payer: MEDICARE

## 2023-11-01 VITALS
HEART RATE: 78 BPM | OXYGEN SATURATION: 98 % | TEMPERATURE: 97.6 F | HEIGHT: 67 IN | SYSTOLIC BLOOD PRESSURE: 123 MMHG | DIASTOLIC BLOOD PRESSURE: 84 MMHG | BODY MASS INDEX: 37.35 KG/M2 | WEIGHT: 238 LBS

## 2023-11-01 DIAGNOSIS — E78.2 MIXED HYPERLIPIDEMIA: Primary | ICD-10-CM

## 2023-11-01 DIAGNOSIS — U07.1 COVID: ICD-10-CM

## 2023-11-01 PROCEDURE — 99214 OFFICE O/P EST MOD 30 MIN: CPT | Performed by: FAMILY MEDICINE

## 2023-11-01 RX ORDER — ROSUVASTATIN CALCIUM 10 MG/1
10 TABLET, COATED ORAL DAILY
Qty: 90 TABLET | Refills: 3 | Status: SHIPPED | OUTPATIENT
Start: 2023-11-01

## 2023-11-01 RX ORDER — PREDNISONE 10 MG/1
TABLET ORAL
Qty: 30 TABLET | Refills: 0 | Status: SHIPPED | OUTPATIENT
Start: 2023-11-01

## 2023-11-01 NOTE — PROGRESS NOTES
Depression Screening and Follow-up Plan: Patient was screened for depression during today's encounter. They screened negative with a PHQ-2 score of 0. Subjective:   Chief Complaint   Patient presents with    Follow-up     Labs follow up , stuffy for the past two month         Patient ID: Etienne Stanley is a 70 y.o. male. Here to review labs. Patient also recently tested positive for COVID. He has a lot of chest congestion. He gets occasional sharp pain in his back when he coughs. The following portions of the patient's history were reviewed and updated as appropriate: allergies, current medications, past family history, past medical history, past social history, past surgical history and problem list.    Review of Systems   Constitutional:  Negative for activity change, appetite change, chills, diaphoresis, fatigue and unexpected weight change. HENT:  Negative for congestion, ear discharge, ear pain, hearing loss, nosebleeds and rhinorrhea. Eyes:  Negative for pain, redness, itching and visual disturbance. Respiratory:  Positive for cough. Negative for choking, chest tightness and shortness of breath. Cardiovascular:  Negative for chest pain and leg swelling. Gastrointestinal:  Negative for abdominal pain, blood in stool, constipation, diarrhea and nausea. Endocrine: Negative for cold intolerance, polydipsia and polyphagia. Genitourinary:  Negative for dysuria, frequency, hematuria and urgency. Musculoskeletal:  Negative for arthralgias, back pain, gait problem, joint swelling, neck pain and neck stiffness. Skin:  Negative for color change and rash. Allergic/Immunologic: Negative for environmental allergies and food allergies. Neurological:  Negative for dizziness, tremors, seizures, speech difficulty, numbness and headaches. Hematological:  Negative for adenopathy. Does not bruise/bleed easily.    Psychiatric/Behavioral:  Negative for behavioral problems, dysphoric mood, hallucinations and self-injury. Objective:  Vitals:    11/01/23 0830   BP: 123/84   Pulse: 78   Temp: 97.6 °F (36.4 °C)   TempSrc: Tympanic   SpO2: 98%   Weight: 108 kg (238 lb)   Height: 5' 7" (1.702 m)      Physical Exam  Constitutional:       General: He is not in acute distress. Appearance: He is well-developed. He is not diaphoretic. HENT:      Head: Normocephalic and atraumatic. Right Ear: External ear normal.      Left Ear: External ear normal.      Nose: Nose normal.      Mouth/Throat:      Pharynx: No oropharyngeal exudate. Eyes:      General: No scleral icterus. Right eye: No discharge. Left eye: No discharge. Conjunctiva/sclera: Conjunctivae normal.      Pupils: Pupils are equal, round, and reactive to light. Neck:      Thyroid: No thyromegaly. Cardiovascular:      Rate and Rhythm: Normal rate and regular rhythm. Heart sounds: Normal heart sounds. No murmur heard. Pulmonary:      Effort: Pulmonary effort is normal.      Breath sounds: Normal breath sounds. No wheezing or rales. Abdominal:      General: Bowel sounds are normal.      Palpations: Abdomen is soft. There is no mass. Tenderness: There is no abdominal tenderness. There is no guarding. Musculoskeletal:         General: No tenderness. Normal range of motion. Cervical back: Normal range of motion and neck supple. Lymphadenopathy:      Cervical: No cervical adenopathy. Skin:     General: Skin is warm and dry. Neurological:      Mental Status: He is alert and oriented to person, place, and time. Deep Tendon Reflexes: Reflexes are normal and symmetric. Psychiatric:         Thought Content: Thought content normal.         Judgment: Judgment normal.           Assessment/Plan:    No problem-specific Assessment & Plan notes found for this encounter. Diagnoses and all orders for this visit:    Mixed hyperlipidemia  -     rosuvastatin (CRESTOR) 10 MG tablet;  Take 1 tablet (10 mg total) by mouth daily    COVID  -     XR chest pa & lateral; Future  -     predniSONE 10 mg tablet; 40mg qdx3d then 30mg qdx3d then 20mg qdx3d then 10mg qdx3d     Asked him to get a chest x-ray. He will take prednisone and a weaning dose over 12 days. When this is completed he can give rosuvastatin a try. He can start with once weekly and then increase to twice weekly and then eventually 3 times per week.

## 2023-11-06 ENCOUNTER — HOSPITAL ENCOUNTER (OUTPATIENT)
Dept: RADIOLOGY | Facility: HOSPITAL | Age: 63
Discharge: HOME | End: 2023-11-06
Payer: MEDICARE

## 2023-11-06 DIAGNOSIS — U07.1 COVID: ICD-10-CM

## 2023-11-06 PROCEDURE — 71046 X-RAY EXAM CHEST 2 VIEWS: CPT

## 2023-11-08 ENCOUNTER — TELEPHONE (OUTPATIENT)
Dept: FAMILY MEDICINE CLINIC | Facility: CLINIC | Age: 63
End: 2023-11-08

## 2023-11-08 NOTE — TELEPHONE ENCOUNTER
Called patient, LVM for patient on normal results and patient advised on normal results that Xray is normal

## 2024-03-13 ENCOUNTER — OFFICE VISIT (OUTPATIENT)
Dept: FAMILY MEDICINE CLINIC | Facility: CLINIC | Age: 64
End: 2024-03-13
Payer: MEDICARE

## 2024-03-13 VITALS
HEIGHT: 67 IN | SYSTOLIC BLOOD PRESSURE: 130 MMHG | WEIGHT: 244 LBS | OXYGEN SATURATION: 98 % | DIASTOLIC BLOOD PRESSURE: 90 MMHG | HEART RATE: 90 BPM | TEMPERATURE: 96.5 F | BODY MASS INDEX: 38.3 KG/M2

## 2024-03-13 DIAGNOSIS — E66.01 CLASS 2 SEVERE OBESITY WITH SERIOUS COMORBIDITY AND BODY MASS INDEX (BMI) OF 38.0 TO 38.9 IN ADULT, UNSPECIFIED OBESITY TYPE (HCC): ICD-10-CM

## 2024-03-13 DIAGNOSIS — J40 BRONCHITIS: Primary | ICD-10-CM

## 2024-03-13 DIAGNOSIS — N18.31 STAGE 3A CHRONIC KIDNEY DISEASE (HCC): ICD-10-CM

## 2024-03-13 DIAGNOSIS — G40.509 NONINTRACTABLE EPILEPSY DUE TO EXTERNAL CAUSES, WITHOUT STATUS EPILEPTICUS (HCC): ICD-10-CM

## 2024-03-13 DIAGNOSIS — C64.1 RENAL CELL CARCINOMA, RIGHT (HCC): ICD-10-CM

## 2024-03-13 PROCEDURE — 99214 OFFICE O/P EST MOD 30 MIN: CPT | Performed by: NURSE PRACTITIONER

## 2024-03-13 PROCEDURE — G2211 COMPLEX E/M VISIT ADD ON: HCPCS | Performed by: NURSE PRACTITIONER

## 2024-03-13 RX ORDER — PREDNISONE 10 MG/1
TABLET ORAL
Qty: 30 TABLET | Refills: 0 | Status: SHIPPED | OUTPATIENT
Start: 2024-03-13

## 2024-03-13 NOTE — PROGRESS NOTES
Name: Mathieu Schneider      : 1960      MRN: [id number]  Encounter Provider: PACO Hernandez  Encounter Date: 3/13/2024   Encounter department: [facility]    Assessment & Plan     1. Bronchitis  Assessment & Plan:  Prednisone taper as directed.    Orders:  -     predniSONE 10 mg tablet; 40mg qdx3d then 30mg qdx3d then 20mg qdx3d then 10mg qdx3d    2. Renal cell carcinoma, right (HCC)  Assessment & Plan:  S/p nephrectomy of right kidney       3. 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   Stable with dilantin three times daily.   Advised to take dilantin morning of surgery with small sip of water      4. Class 2 severe obesity with serious comorbidity and body mass index (BMI) of 38.0 to 38.9 in adult, unspecified obesity type   Assessment & Plan:  Encouraged healthy dietary choices  Increase physical activity once able too       5. Stage 3a chronic kidney disease (HCC)  Assessment & Plan:  Lab Results   Component Value Date    EGFR 66 2023    EGFR 56 2023    EGFR 50 2020    CREATININE 1.21 2023    CREATININE 1.31 (H) 2023    CREATININE 1.22 2021   s/p right nephrectomy in   Advised to avoid nephrotoxic medications- he avoids NSAIDs  Maintain healthy blood pressure  We will continue to monitor this routinely           Subjective      Started over the weekend with runny nose and then gradually started with dry cough. Now back pain. Not getting a lot of mucous. Hard to take a deep breath in. No wheezing. Cough is worse at night time. No fevers. No post nasal drip. No ear pain or pressure, no sinus pain or pressure. Has tried claritin but hasn't been helping          Review of Systems   Constitutional:  Negative for fatigue and fever.   HENT:  Positive for rhinorrhea. Negative for ear discharge, ear pain,  "postnasal drip, sinus pressure, sinus pain and sore throat.    Respiratory:  Positive for cough and shortness of breath.    Musculoskeletal:  Positive for back pain.   Neurological: Negative.    Hematological: Negative.        Current Outpatient Medications on File Prior to Visit   Medication Sig   • acetaminophen (TYLENOL) 500 mg tablet Take 1,000 mg by mouth every 6 (six) hours as needed for mild pain   • amLODIPine (NORVASC) 10 mg tablet TAKE 1 TABLET BY MOUTH EVERY DAY   • phenytoin (DILANTIN) 100 mg ER capsule TAKE 1 CAPSULE BY MOUTH THREE TIMES A DAY   • rosuvastatin (CRESTOR) 10 MG tablet Take 1 tablet (10 mg total) by mouth daily   • aspirin (ECOTRIN LOW STRENGTH) 81 mg EC tablet Take 1 tablet (81 mg total) by mouth 2 (two) times a day for 21 days   • Cholecalciferol (Vitamin D3) 125 MCG (5000 UT) TABS Take 5,000 Units by mouth daily (Patient not taking: Reported on 8/4/2023)   • LORazepam (Ativan) 0.5 mg tablet Take 1 tab 1 hour prior to trip.  May repeat in 4 hours if needed (Patient not taking: Reported on 8/4/2023)   • oxyCODONE (ROXICODONE) 5 immediate release tablet Take 1 tablet (5 mg total) by mouth every 4 (four) hours as needed for moderate pain for up to 15 doses Max Daily Amount: 30 mg (Patient not taking: Reported on 7/28/2023)   • scopolamine (TRANSDERM-SCOP) 1 mg/3 days TD 72 hr patch Place 1 patch on the skin over 72 hours every third day (Patient not taking: Reported on 8/4/2023)       Objective     /90   Pulse 90   Temp (!) 96.5 °F (35.8 °C) (Tympanic)   Ht 5' 7\" (1.702 m)   Wt 111 kg (244 lb)   SpO2 98%   BMI 38.22 kg/m²     Physical Exam  Constitutional:       General: He is not in acute distress.     Appearance: Normal appearance. He is obese. He is not ill-appearing.   HENT:      Right Ear: Tympanic membrane, ear canal and external ear normal.      Left Ear: Tympanic membrane, ear canal and external ear normal.      Nose: Congestion and rhinorrhea present.      Mouth/Throat: "      Mouth: Mucous membranes are moist.      Pharynx: Posterior oropharyngeal erythema present.   Eyes:      General: No scleral icterus.        Right eye: No discharge.         Left eye: No discharge.      Extraocular Movements: Extraocular movements intact.      Conjunctiva/sclera: Conjunctivae normal.      Pupils: Pupils are equal, round, and reactive to light.   Cardiovascular:      Rate and Rhythm: Normal rate and regular rhythm.      Heart sounds: Normal heart sounds. No murmur heard.  Pulmonary:      Effort: Pulmonary effort is normal.      Breath sounds: Wheezing and rhonchi present.   Abdominal:      Palpations: Abdomen is soft.      Tenderness: There is no abdominal tenderness.   Musculoskeletal:      Cervical back: Neck supple.   Skin:     General: Skin is warm.      Findings: No rash.   Neurological:      Mental Status: He is alert.       PACO Hernandez

## 2024-03-25 PROBLEM — J40 BRONCHITIS: Status: ACTIVE | Noted: 2024-03-25

## 2024-03-25 NOTE — ASSESSMENT & PLAN NOTE
Lab Results   Component Value Date    EGFR 66 09/11/2023    EGFR 56 07/18/2023    EGFR 50 06/04/2020    CREATININE 1.21 09/11/2023    CREATININE 1.31 (H) 07/18/2023    CREATININE 1.22 01/13/2021   s/p right nephrectomy in 2015  Advised to avoid nephrotoxic medications- he avoids NSAIDs  Maintain healthy blood pressure  We will continue to monitor this routinely

## 2024-06-20 DIAGNOSIS — G40.909 SEIZURE DISORDER (HCC): ICD-10-CM

## 2024-06-21 RX ORDER — PHENYTOIN SODIUM 100 MG/1
CAPSULE, EXTENDED RELEASE ORAL
Qty: 270 CAPSULE | Refills: 3 | Status: SHIPPED | OUTPATIENT
Start: 2024-06-21 | End: 2024-06-27 | Stop reason: SDUPTHER

## 2024-06-24 ENCOUNTER — NURSE TRIAGE (OUTPATIENT)
Age: 64
End: 2024-06-24

## 2024-06-24 NOTE — TELEPHONE ENCOUNTER
"Returned call to patient regarding his diarrhea. Started on Thursday and he has been going about 4 to 5 times a day. Slight abdominal discomfort. Scheduled him with first available appointment on Wednesday. Patient is drinking water and gatorade. Has no signs of dehydration. please advise of any recommendations prior to appointment.         Reason for Disposition   MODERATE diarrhea (e.g., 4-6 times / day more than normal) and present > 48 hours (2 days)    Answer Assessment - Initial Assessment Questions  1. DIARRHEA SEVERITY: \"How bad is the diarrhea?\" \"How many extra stools have you had in the past 24 hours than normal?\"     - NO DIARRHEA (SCALE 0)    - MILD (SCALE 1-3): Few loose or mushy BMs; increase of 1-3 stools over normal daily number of stools; mild increase in ostomy output.    -  MODERATE (SCALE 4-7): Increase of 4-6 stools daily over normal; moderate increase in ostomy output.  * SEVERE (SCALE 8-10; OR 'WORST POSSIBLE'): Increase of 7 or more stools daily over normal; moderate increase in ostomy output; incontinence.      MODERATE  2. ONSET: \"When did the diarrhea begin?\"       THURSDAY  3. BM CONSISTENCY: \"How loose or watery is the diarrhea?\"       Watery   4. VOMITING: \"Are you also vomiting?\" If Yes, ask: \"How many times in the past 24 hours?\"       denies  5. ABDOMINAL PAIN: \"Are you having any abdominal pain?\" If Yes, ask: \"What does it feel like?\" (e.g., crampy, dull, intermittent, constant)       slight  6. ABDOMINAL PAIN SEVERITY: If present, ask: \"How bad is the pain?\"  (e.g., Scale 1-10; mild, moderate, or severe)    - MILD (1-3): doesn't interfere with normal activities, abdomen soft and not tender to touch     - MODERATE (4-7): interferes with normal activities or awakens from sleep, tender to touch     - SEVERE (8-10): excruciating pain, doubled over, unable to do any normal activities        mild  7. ORAL INTAKE: If vomiting, \"Have you been able to drink liquids?\" \"How much fluids have " "you had in the past 24 hours?\"      Yes and gatorade  8. HYDRATION: \"Any signs of dehydration?\" (e.g., dry mouth [not just dry lips], too weak to stand, dizziness, new weight loss) \"When did you last urinate?\"      denies  9. EXPOSURE: \"Have you traveled to a foreign country recently?\" \"Have you been exposed to anyone with diarrhea?\" \"Could you have eaten any food that was spoiled?\"      denies  10. ANTIBIOTIC USE: \"Are you taking antibiotics now or have you taken antibiotics in the past 2 months?\"        denies  11. OTHER SYMPTOMS: \"Do you have any other symptoms?\" (e.g., fever, blood in stool)        denies  12. PREGNANCY: \"Is there any chance you are pregnant?\" \"When was your last menstrual period?\"        Na.    Protocols used: Diarrhea-ADULT-[place]    "

## 2024-06-24 NOTE — TELEPHONE ENCOUNTER
Recommend BRAT Diet- Bananas, rice, apples, toast to help bind stools, stay well hydrated. If develops fever, worsening abdominal pain go to ER immediately

## 2024-06-24 NOTE — TELEPHONE ENCOUNTER
Regarding: Diarrhea  ----- Message from Federico NASH sent at 6/24/2024 10:58 AM EDT -----  Patient calling concerns of acute diarrhea. Pt symptoms began on Thursday. Pt has been keeping himself hydrated. Denied taking OTC medication. Please advise.  Thank You

## 2024-06-27 DIAGNOSIS — G40.909 SEIZURE DISORDER (HCC): ICD-10-CM

## 2024-06-27 RX ORDER — PHENYTOIN SODIUM 100 MG/1
100 CAPSULE, EXTENDED RELEASE ORAL 3 TIMES DAILY
Qty: 270 CAPSULE | Refills: 1 | Status: SHIPPED | OUTPATIENT
Start: 2024-06-27

## 2024-06-27 NOTE — TELEPHONE ENCOUNTER
Reason for call:   [x] Refill   [] Prior Auth  [] Other:     Office:   [x] PCP/Provider - Ruiz Marte,   PCP - General  [] Specialty/Provider -     Medication: phenytoin (DILANTIN) 100 mg ER capsule - 3 x per day # 270      Pharmacy: [facility]/pharmacy #[id number] - [place], [place] - [address]     Does the patient have enough for 3 days?   [] Yes   [x] No - Send as HP to POD

## 2024-08-08 DIAGNOSIS — I10 BENIGN ESSENTIAL HYPERTENSION: ICD-10-CM

## 2024-08-08 RX ORDER — AMLODIPINE BESYLATE 10 MG/1
TABLET ORAL
Qty: 30 TABLET | Refills: 0 | Status: SHIPPED | OUTPATIENT
Start: 2024-08-08

## 2024-09-10 DIAGNOSIS — I10 BENIGN ESSENTIAL HYPERTENSION: ICD-10-CM

## 2024-09-10 RX ORDER — AMLODIPINE BESYLATE 10 MG/1
TABLET ORAL
Qty: 90 TABLET | Refills: 1 | Status: SHIPPED | OUTPATIENT
Start: 2024-09-10

## 2024-09-23 ENCOUNTER — TELEPHONE (OUTPATIENT)
Dept: FAMILY MEDICINE CLINIC | Facility: CLINIC | Age: 64
End: 2024-09-23

## 2024-09-23 DIAGNOSIS — I10 BENIGN ESSENTIAL HYPERTENSION: Primary | ICD-10-CM

## 2024-09-23 DIAGNOSIS — N18.31 STAGE 3A CHRONIC KIDNEY DISEASE (HCC): ICD-10-CM

## 2024-09-23 DIAGNOSIS — Z85.46 HISTORY OF PROSTATE CANCER: ICD-10-CM

## 2024-09-23 DIAGNOSIS — E78.2 MIXED HYPERLIPIDEMIA: ICD-10-CM

## 2024-09-23 NOTE — TELEPHONE ENCOUNTER
Patient called the Rx line stating that he is due for labs and would like to pick the lab orders whenever is ready. Please review and reach out to Patient when ready. Patient goes to [facility] for blood work.

## 2024-09-25 ENCOUNTER — OFFICE VISIT (OUTPATIENT)
Dept: FAMILY MEDICINE CLINIC | Facility: CLINIC | Age: 64
End: 2024-09-25
Payer: MEDICARE

## 2024-09-25 VITALS
OXYGEN SATURATION: 96 % | WEIGHT: 248 LBS | BODY MASS INDEX: 38.92 KG/M2 | HEIGHT: 67 IN | HEART RATE: 68 BPM | DIASTOLIC BLOOD PRESSURE: 88 MMHG | SYSTOLIC BLOOD PRESSURE: 132 MMHG | TEMPERATURE: 97.2 F

## 2024-09-25 DIAGNOSIS — Z90.5 H/O UNILATERAL NEPHRECTOMY: ICD-10-CM

## 2024-09-25 DIAGNOSIS — Z00.00 MEDICARE ANNUAL WELLNESS VISIT, SUBSEQUENT: Primary | ICD-10-CM

## 2024-09-25 DIAGNOSIS — G40.909 SEIZURE DISORDER (HCC): ICD-10-CM

## 2024-09-25 PROCEDURE — G0438 PPPS, INITIAL VISIT: HCPCS | Performed by: NURSE PRACTITIONER

## 2024-09-25 NOTE — PROGRESS NOTES
Ambulatory Visit  Name: Mathieu Schneider      : 1960      MRN: [id number]  Encounter Provider: PACO Hernandez  Encounter Date: 2024   Encounter department: [facility]    Assessment & Plan  Medicare annual wellness visit, subsequent         Seizure disorder (HCC)  Last seizure - suspect baseball injury with getting hit in head with ball.   Stable with dilantin three times daily.   Advised to take dilantin morning of surgery with small sip of water     Orders:    Phenytoin level, total; Future    Phenytoin level, total    H/O unilateral nephrectomy  History of right nephrectomy           Depression Screening and Follow-up Plan: Patient was screened for depression during today's encounter. They screened negative with a PHQ-2 score of 0.      Preventive health issues were discussed with patient, and age appropriate screening tests were ordered as noted in patient's After Visit Summary. Personalized health advice and appropriate referrals for health education or preventive services given if needed, as noted in patient's After Visit Summary.    History of Present Illness     Dealing with some carpal tunnel issues - will use a brace and ice periodically when it flares.     Left side back/shoulder/scapula issues for last 6 months- intermittent was worse during the summer. Feels underneath the scapula- his son will step on his back and it feels it pops back into place. Feels a really bad pulled muscle, doesn't have limited ROM in the shoulder. Usually brought on by overuse with pushing, pulling, lifting, overhead work.      Patient Care Team:  Ruiz Marte DO as PCP - General (Family Medicine)  Stu Knight MD    Review of Systems  Medical History Reviewed by provider this encounter:       Annual Wellness Visit Questionnaire   Mathieu is here for his Subsequent Wellness visit.     Health Risk Assessment:   Patient rates overall health as good. Patient feels that their physical  health rating is same. Patient is satisfied with their life. Eyesight was rated as slightly worse. Hearing was rated as slightly worse. Patient feels that their emotional and mental health rating is same. Patients states they are sometimes angry. Patient states they are sometimes unusually tired/fatigued. Pain experienced in the last 7 days has been some. Patient's pain rating has been 5/10. Patient states that he has experienced no weight loss or gain in last 6 months.     Depression Screening:   PHQ-2 Score: 0      Fall Risk Screening:   In the past year, patient has experienced: no history of falling in past year      Home Safety:  Patient has trouble with stairs inside or outside of their home. Patient has working smoke alarms and has working carbon monoxide detector. Home safety hazards include: none.     Nutrition:   Current diet is Regular.     Medications:   Patient is currently taking over-the-counter supplements. OTC medications include: see medication list. Patient is able to manage medications.     Activities of Daily Living (ADLs)/Instrumental Activities of Daily Living (IADLs):   Walk and transfer into and out of bed and chair?: Yes  Dress and groom yourself?: Yes    Bathe or shower yourself?: Yes    Feed yourself? Yes  Do your laundry/housekeeping?: Yes  Manage your money, pay your bills and track your expenses?: Yes  Make your own meals?: Yes    Do your own shopping?: Yes    Previous Hospitalizations:   Any hospitalizations or ED visits within the last 12 months?: No      PREVENTIVE SCREENINGS      Cardiovascular Screening:    General: Screening Not Indicated and History Lipid Disorder      Colorectal Cancer Screening:     General: Screening Current      Prostate Cancer Screening:    General: History Prostate Cancer      Abdominal Aortic Aneurysm (AAA) Screening:    Risk factors include: age between 65-74 yo        Lung Cancer Screening:     General: Screening Not Indicated      Hepatitis C  Screening:    General: Screening Current    Screening, Brief Intervention, and Referral to Treatment (SBIRT)    Screening  Typical number of drinks in a day: 0  Typical number of drinks in a week: 0  Interpretation: Low risk drinking behavior.    Social Determinants of Health     Financial Resource Strain: Low Risk  (6/15/2023)    Overall Financial Resource Strain (CARDIA)     Difficulty of Paying Living Expenses: Not hard at all   Transportation Needs: No Transportation Needs (6/15/2023)    PRAPARE - Transportation     Lack of Transportation (Medical): No     Lack of Transportation (Non-Medical): No     No results found.    Objective     There were no vitals taken for this visit.    Physical Exam  Vitals reviewed.   Constitutional:       Appearance: Normal appearance. He is well-developed. He is obese.   HENT:      Head: Normocephalic.      Right Ear: Tympanic membrane, ear canal and external ear normal.      Left Ear: Tympanic membrane, ear canal and external ear normal.      Nose: Nose normal.      Mouth/Throat:      Mouth: Mucous membranes are moist.      Pharynx: Oropharynx is clear.   Eyes:      Conjunctiva/sclera: Conjunctivae normal.      Pupils: Pupils are equal, round, and reactive to light.   Cardiovascular:      Rate and Rhythm: Normal rate and regular rhythm.      Heart sounds: Normal heart sounds.   Pulmonary:      Effort: Pulmonary effort is normal.      Breath sounds: Normal breath sounds.   Abdominal:      General: Bowel sounds are normal.      Palpations: Abdomen is soft.      Tenderness: There is no abdominal tenderness.   Genitourinary:     Prostate: Normal.   Musculoskeletal:         General: Normal range of motion.      Cervical back: Normal range of motion and neck supple.   Skin:     General: Skin is warm and dry.   Neurological:      Mental Status: He is alert and oriented to person, place, and time.      Deep Tendon Reflexes: Reflexes normal.   Psychiatric:         Behavior: Behavior  normal.         Thought Content: Thought content normal.         Judgment: Judgment normal.

## 2024-09-25 NOTE — PATIENT INSTRUCTIONS
Medicare Preventive Visit Patient Instructions  Thank you for completing your Welcome to Medicare Visit or Medicare Annual Wellness Visit today. Your next wellness visit will be due in one year (9/26/2025).  The screening/preventive services that you may require over the next 5-10 years are detailed below. Some tests may not apply to you based off risk factors and/or age. Screening tests ordered at today's visit but not completed yet may show as past due. Also, please note that scanned in results may not display below.  Preventive Screenings:  Service Recommendations Previous Testing/Comments   Colorectal Cancer Screening  Colonoscopy    Fecal Occult Blood Test (FOBT)/Fecal Immunochemical Test (FIT)  Fecal DNA/Cologuard Test  Flexible Sigmoidoscopy Age: 45-75 years old   Colonoscopy: every 10 years (May be performed more frequently if at higher risk)  OR  FOBT/FIT: every 1 year  OR  Cologuard: every 3 years  OR  Sigmoidoscopy: every 5 years  Screening may be recommended earlier than age 45 if at higher risk for colorectal cancer. Also, an individualized decision between you and your healthcare provider will decide whether screening between the ages of 76-85 would be appropriate. Colonoscopy: 03/18/2022  FOBT/FIT: Not on file  Cologuard: Not on file  Sigmoidoscopy: Not on file    Screening Current     Prostate Cancer Screening Individualized decision between patient and health care provider in men between ages of 55-69   Medicare will cover every 12 months beginning on the day after your 50th birthday PSA: <0.1 ng/mL     History Prostate Cancer     Hepatitis C Screening Once for adults born between 1945 and 1965  More frequently in patients at high risk for Hepatitis C Hep C Antibody: 12/04/2018    Screening Current   Diabetes Screening 1-2 times per year if you're at risk for diabetes or have pre-diabetes Fasting glucose: 103 mg/dL (7/18/2023)  A1C: No results in last 5 years (No results in last 5 years)       Cholesterol Screening Once every 5 years if you don't have a lipid disorder. May order more often based on risk factors. Lipid panel: 09/11/2023  Screening Not Indicated  History Lipid Disorder      Other Preventive Screenings Covered by Medicare:  Abdominal Aortic Aneurysm (AAA) Screening: covered once if your at risk. You're considered to be at risk if you have a family history of AAA or a male between the age of 65-75 who smoking at least 100 cigarettes in your lifetime.  Lung Cancer Screening: covers low dose CT scan once per year if you meet all of the following conditions: (1) Age 55-77; (2) No signs or symptoms of lung cancer; (3) Current smoker or have quit smoking within the last 15 years; (4) You have a tobacco smoking history of at least 20 pack years (packs per day x number of years you smoked); (5) You get a written order from a healthcare provider.  Glaucoma Screening: covered annually if you're considered high risk: (1) You have diabetes OR (2) Family history of glaucoma OR (3)  aged 50 and older OR (4)  American aged 65 and older  Osteoporosis Screening: covered every 2 years if you meet one of the following conditions: (1) Have a vertebral abnormality; (2) On glucocorticoid therapy for more than 3 months; (3) Have primary hyperparathyroidism; (4) On osteoporosis medications and need to assess response to drug therapy.  HIV Screening: covered annually if you're between the age of 15-65. Also covered annually if you are younger than 15 and older than 65 with risk factors for HIV infection. For pregnant patients, it is covered up to 3 times per pregnancy.    Immunizations:  Immunization Recommendations   Influenza Vaccine Annual influenza vaccination during flu season is recommended for all persons aged >= 6 months who do not have contraindications   Pneumococcal Vaccine   * Pneumococcal conjugate vaccine = PCV13 (Prevnar 13), PCV15 (Vaxneuvance), PCV20 (Prevnar 20)  *  Pneumococcal polysaccharide vaccine = PPSV23 (Pneumovax) Adults 19-63 yo with certain risk factors or if 65+ yo  If never received any pneumonia vaccine: recommend Prevnar 20 (PCV20)  Give PCV20 if previously received 1 dose of PCV13 or PPSV23   Hepatitis B Vaccine 3 dose series if at intermediate or high risk (ex: diabetes, end stage renal disease, liver disease)   Respiratory syncytial virus (RSV) Vaccine - COVERED BY MEDICARE PART D  * RSVPreF3 (Arexvy) CDC recommends that adults 60 years of age and older may receive a single dose of RSV vaccine using shared clinical decision-making (SCDM)   Tetanus (Td) Vaccine - COST NOT COVERED BY MEDICARE PART B Following completion of primary series, a booster dose should be given every 10 years to maintain immunity against tetanus. Td may also be given as tetanus wound prophylaxis.   Tdap Vaccine - COST NOT COVERED BY MEDICARE PART B Recommended at least once for all adults. For pregnant patients, recommended with each pregnancy.   Shingles Vaccine (Shingrix) - COST NOT COVERED BY MEDICARE PART B  2 shot series recommended in those 19 years and older who have or will have weakened immune systems or those 50 years and older     Health Maintenance Due:      Topic Date Due   • Colorectal Cancer Screening  03/17/2025   • Hepatitis C Screening  Completed     Immunizations Due:      Topic Date Due   • Pneumococcal Vaccine: 65+ Years (1 of 1 - PCV) Never done   • COVID-19 Vaccine (1 - 2023-24 season) Never done   • Influenza Vaccine (1) 09/01/2024     Advance Directives   What are advance directives?  Advance directives are legal documents that state your wishes and plans for medical care. These plans are made ahead of time in case you lose your ability to make decisions for yourself. Advance directives can apply to any medical decision, such as the treatments you want, and if you want to donate organs.   What are the types of advance directives?  There are many types of advance  directives, and each state has rules about how to use them. You may choose a combination of any of the following:  Living will:  This is a written record of the treatment you want. You can also choose which treatments you do not want, which to limit, and which to stop at a certain time. This includes surgery, medicine, IV fluid, and tube feedings.   Durable power of  for healthcare (DPAHC):  This is a written record that states who you want to make healthcare choices for you when you are unable to make them for yourself. This person, called a proxy, is usually a family member or a friend. You may choose more than 1 proxy.  Do not resuscitate (DNR) order:  A DNR order is used in case your heart stops beating or you stop breathing. It is a request not to have certain forms of treatment, such as CPR. A DNR order may be included in other types of advance directives.  Medical directive:  This covers the care that you want if you are in a coma, near death, or unable to make decisions for yourself. You can list the treatments you want for each condition. Treatment may include pain medicine, surgery, blood transfusions, dialysis, IV or tube feedings, and a ventilator (breathing machine).  Values history:  This document has questions about your views, beliefs, and how you feel and think about life. This information can help others choose the care that you would choose.  Why are advance directives important?  An advance directive helps you control your care. Although spoken wishes may be used, it is better to have your wishes written down. Spoken wishes can be misunderstood, or not followed. Treatments may be given even if you do not want them. An advance directive may make it easier for your family to make difficult choices about your care.   Weight Management   Why it is important to manage your weight:  Being overweight increases your risk of health conditions such as heart disease, high blood pressure, type 2  diabetes, and certain types of cancer. It can also increase your risk for osteoarthritis, sleep apnea, and other respiratory problems. Aim for a slow, steady weight loss. Even a small amount of weight loss can lower your risk of health problems.  How to lose weight safely:  A safe and healthy way to lose weight is to eat fewer calories and get regular exercise. You can lose up about 1 pound a week by decreasing the number of calories you eat by 500 calories each day.   Healthy meal plan for weight management:  A healthy meal plan includes a variety of foods, contains fewer calories, and helps you stay healthy. A healthy meal plan includes the following:  Eat whole-grain foods more often.  A healthy meal plan should contain fiber. Fiber is the part of grains, fruits, and vegetables that is not broken down by your body. Whole-grain foods are healthy and provide extra fiber in your diet. Some examples of whole-grain foods are whole-wheat breads and pastas, oatmeal, brown rice, and bulgur.  Eat a variety of vegetables every day.  Include dark, leafy greens such as spinach, kale, angi greens, and mustard greens. Eat yellow and orange vegetables such as carrots, sweet potatoes, and winter squash.   Eat a variety of fruits every day.  Choose fresh or canned fruit (canned in its own juice or light syrup) instead of juice. Fruit juice has very little or no fiber.  Eat low-fat dairy foods.  Drink fat-free (skim) milk or 1% milk. Eat fat-free yogurt and low-fat cottage cheese. Try low-fat cheeses such as mozzarella and other reduced-fat cheeses.  Choose meat and other protein foods that are low in fat.  Choose beans or other legumes such as split peas or lentils. Choose fish, skinless poultry (chicken or turkey), or lean cuts of red meat (beef or pork). Before you cook meat or poultry, cut off any visible fat.   Use less fat and oil.  Try baking foods instead of frying them. Add less fat, such as margarine, sour cream,  regular salad dressing and mayonnaise to foods. Eat fewer high-fat foods. Some examples of high-fat foods include french fries, doughnuts, ice cream, and cakes.  Eat fewer sweets.  Limit foods and drinks that are high in sugar. This includes candy, cookies, regular soda, and sweetened drinks.  Exercise:  Exercise at least 30 minutes per day on most days of the week. Some examples of exercise include walking, biking, dancing, and swimming. You can also fit in more physical activity by taking the stairs instead of the elevator or parking farther away from stores. Ask your healthcare provider about the best exercise plan for you.      © Copyright [employer] 2018 Information is for End User's use only and may not be sold, redistributed or otherwise used for commercial purposes. All illustrations and images included in CareNotes® are the copyrighted property of [employer]M., Inc. or [employer]

## 2024-09-28 LAB
ALBUMIN SERPL-MCNC: 4.2 G/DL (ref 3.9–4.9)
ALP SERPL-CCNC: 86 IU/L (ref 44–121)
ALT SERPL-CCNC: 10 IU/L (ref 0–44)
AST SERPL-CCNC: 13 IU/L (ref 0–40)
BASOPHILS # BLD AUTO: 0 X10E3/UL (ref 0–0.2)
BASOPHILS NFR BLD AUTO: 1 %
BILIRUB SERPL-MCNC: 0.4 MG/DL (ref 0–1.2)
BUN SERPL-MCNC: 17 MG/DL (ref 8–27)
BUN/CREAT SERPL: 14 (ref 10–24)
CALCIUM SERPL-MCNC: 9.3 MG/DL (ref 8.6–10.2)
CHLORIDE SERPL-SCNC: 106 MMOL/L (ref 96–106)
CHOLEST SERPL-MCNC: 312 MG/DL (ref 100–199)
CO2 SERPL-SCNC: 20 MMOL/L (ref 20–29)
CREAT SERPL-MCNC: 1.25 MG/DL (ref 0.76–1.27)
EGFR: 64 ML/MIN/1.73
EOSINOPHIL # BLD AUTO: 0.1 X10E3/UL (ref 0–0.4)
EOSINOPHIL NFR BLD AUTO: 2 %
ERYTHROCYTE [DISTWIDTH] IN BLOOD BY AUTOMATED COUNT: 13.6 % (ref 11.6–15.4)
GLOBULIN SER-MCNC: 2.3 G/DL (ref 1.5–4.5)
GLUCOSE SERPL-MCNC: 99 MG/DL (ref 70–99)
HCT VFR BLD AUTO: 49.3 % (ref 37.5–51)
HDLC SERPL-MCNC: 44 MG/DL
HGB BLD-MCNC: 16.7 G/DL (ref 13–17.7)
IMM GRANULOCYTES # BLD: 0 X10E3/UL (ref 0–0.1)
IMM GRANULOCYTES NFR BLD: 0 %
LDL CALC COMMENT: ABNORMAL
LDLC SERPL CALC-MCNC: 220 MG/DL (ref 0–99)
LDLC/HDLC SERPL: 5 RATIO (ref 0–3.6)
LYMPHOCYTES # BLD AUTO: 1.4 X10E3/UL (ref 0.7–3.1)
LYMPHOCYTES NFR BLD AUTO: 22 %
MCH RBC QN AUTO: 30.5 PG (ref 26.6–33)
MCHC RBC AUTO-ENTMCNC: 33.9 G/DL (ref 31.5–35.7)
MCV RBC AUTO: 90 FL (ref 79–97)
MONOCYTES # BLD AUTO: 0.5 X10E3/UL (ref 0.1–0.9)
MONOCYTES NFR BLD AUTO: 8 %
NEUTROPHILS # BLD AUTO: 4.4 X10E3/UL (ref 1.4–7)
NEUTROPHILS NFR BLD AUTO: 67 %
PHENYTOIN SERPL-MCNC: 3.9 UG/ML (ref 10–20)
PLATELET # BLD AUTO: 265 X10E3/UL (ref 150–450)
POTASSIUM SERPL-SCNC: 4.6 MMOL/L (ref 3.5–5.2)
PROT SERPL-MCNC: 6.5 G/DL (ref 6–8.5)
PSA FREE MFR SERPL: NORMAL %
PSA FREE SERPL-MCNC: <0.02 NG/ML
PSA SERPL-MCNC: <0.1 NG/ML (ref 0–4)
RBC # BLD AUTO: 5.47 X10E6/UL (ref 4.14–5.8)
SL AMB VLDL CHOLESTEROL CALC: 48 MG/DL (ref 5–40)
SODIUM SERPL-SCNC: 140 MMOL/L (ref 134–144)
TRIGL SERPL-MCNC: 237 MG/DL (ref 0–149)
TSH SERPL DL<=0.005 MIU/L-ACNC: 1.56 UIU/ML (ref 0.45–4.5)
WBC # BLD AUTO: 6.5 X10E3/UL (ref 3.4–10.8)

## 2024-09-30 NOTE — ASSESSMENT & PLAN NOTE
Last seizure 1980- suspect baseball injury with getting hit in head with ball.   Stable with dilantin three times daily.   Advised to take dilantin morning of surgery with small sip of water     Orders:    Phenytoin level, total; Future    Phenytoin level, total

## 2024-10-03 ENCOUNTER — TELEPHONE (OUTPATIENT)
Age: 64
End: 2024-10-03

## 2024-10-03 NOTE — RESULT ENCOUNTER NOTE
Juan Murdock, Your labs show your cholesterol is much higher than years past. You are concerned a high risk patient for an acute coronary event (heart attack or stroke) in the next 10 years. Recommend starting statin medication to help lower cholesterol. Your other remaining labs including complete blood count, your kidney functions, liver functions, electrolytes are normal. Your PSA for prostate cancer screening is normal, thyroid is normal, and dilantin level is a little lower than normal which is okay as long as you are not having seizures. If you were to high we would have to adjust your dose down as it could be toxic but your lab is not showing that. If he is agreeable to statin I will send it to the pharmacy.

## 2024-10-03 NOTE — TELEPHONE ENCOUNTER
Patient calling to follow-up on labs he had completed at [facility] on Friday 9/27/24 - results available in chart, pending provider review. Informed patient we will call him back from the office with her interpretation/recommendations. Please review and advise patient.

## 2024-10-10 ENCOUNTER — OFFICE VISIT (OUTPATIENT)
Dept: FAMILY MEDICINE CLINIC | Facility: CLINIC | Age: 64
End: 2024-10-10
Payer: MEDICARE

## 2024-10-10 VITALS
WEIGHT: 250 LBS | TEMPERATURE: 97.5 F | OXYGEN SATURATION: 94 % | HEART RATE: 92 BPM | BODY MASS INDEX: 39.24 KG/M2 | SYSTOLIC BLOOD PRESSURE: 118 MMHG | HEIGHT: 67 IN | DIASTOLIC BLOOD PRESSURE: 90 MMHG

## 2024-10-10 DIAGNOSIS — E78.2 MIXED HYPERLIPIDEMIA: Primary | ICD-10-CM

## 2024-10-10 DIAGNOSIS — I10 BENIGN ESSENTIAL HYPERTENSION: ICD-10-CM

## 2024-10-10 PROCEDURE — G2211 COMPLEX E/M VISIT ADD ON: HCPCS | Performed by: NURSE PRACTITIONER

## 2024-10-10 PROCEDURE — 99213 OFFICE O/P EST LOW 20 MIN: CPT | Performed by: NURSE PRACTITIONER

## 2024-10-10 RX ORDER — AMLODIPINE BESYLATE 10 MG/1
10 TABLET ORAL DAILY
Qty: 90 TABLET | Refills: 1 | Status: SHIPPED | OUTPATIENT
Start: 2024-10-10

## 2024-10-10 NOTE — ASSESSMENT & PLAN NOTE
change, not tolerating accent care   Will call and have previous  filled for him  Orders:    amLODIPine (NORVASC) 10 mg tablet; Take 1 tablet (10 mg total) by mouth daily Patient prefers Lupin

## 2024-10-10 NOTE — PROGRESS NOTES
Ambulatory Visit  Name: Mathieu Schneider      : 1960      MRN: [id number]  Encounter Provider: PACO Hernandez  Encounter Date: 10/10/2024   Encounter department: [facility]    Assessment & Plan  Mixed hyperlipidemia  Check CT calcium score  Discussed statins appears to have been intolerant of lipitor and crestor  Has not done repatha  Will check CT calcium score  Work on dietary modifications and make a plan after  Orders:    CT coronary calcium score; Future    Benign essential hypertension   change, not tolerating accent care   Will call and have previous  filled for him  Orders:    amLODIPine (NORVASC) 10 mg tablet; Take 1 tablet (10 mg total) by mouth daily Patient prefers Lupin        History of Present Illness     Here today to review labs  24: , HDL 44    Has been intolerant of lipitor and crestor in past    Family history of heart disease              Review of Systems   Constitutional:  Negative for activity change, appetite change, chills, diaphoresis, fatigue and unexpected weight change.   HENT:  Negative for congestion, ear discharge, ear pain, hearing loss, nosebleeds and rhinorrhea.    Eyes:  Negative for pain, redness, itching and visual disturbance.   Respiratory:  Negative for cough, choking, chest tightness and shortness of breath.    Cardiovascular:  Negative for chest pain and leg swelling.   Gastrointestinal:  Negative for abdominal pain, blood in stool, constipation, diarrhea and nausea.   Endocrine: Negative for cold intolerance, polydipsia and polyphagia.   Genitourinary:  Negative for dysuria, frequency, hematuria and urgency.   Musculoskeletal:  Negative for arthralgias, back pain, gait problem, joint swelling, neck pain and neck stiffness.   Skin:  Negative for color change and rash.   Allergic/Immunologic: Negative for environmental allergies and food allergies.   Neurological:   "Negative for dizziness, tremors, seizures, speech difficulty, numbness and headaches.   Hematological:  Negative for adenopathy. Does not bruise/bleed easily.   Psychiatric/Behavioral:  Negative for behavioral problems, dysphoric mood, hallucinations and self-injury.            Objective     /90 (BP Location: Left arm, Patient Position: Sitting, Cuff Size: Standard)   Pulse 92   Temp 97.5 °F (36.4 °C) (Tympanic)   Ht 5' 7\" (1.702 m)   Wt 113 kg (250 lb)   SpO2 94%   BMI 39.16 kg/m²     Physical Exam  Vitals reviewed.   Constitutional:       General: He is not in acute distress.     Appearance: Normal appearance. He is obese.   HENT:      Head: Normocephalic.   Cardiovascular:      Rate and Rhythm: Normal rate and regular rhythm.   Pulmonary:      Effort: No respiratory distress.      Breath sounds: Normal breath sounds.   Neurological:      Mental Status: He is alert and oriented to person, place, and time.   Psychiatric:         Mood and Affect: Mood normal.         Behavior: Behavior normal.         "

## 2024-10-10 NOTE — ASSESSMENT & PLAN NOTE
Check CT calcium score  Discussed statins appears to have been intolerant of lipitor and crestor  Has not done repatha  Will check CT calcium score  Work on dietary modifications and make a plan after  Orders:    CT coronary calcium score; Future

## 2024-10-15 ENCOUNTER — HOSPITAL ENCOUNTER (OUTPATIENT)
Dept: CT IMAGING | Facility: HOSPITAL | Age: 64
Discharge: HOME | End: 2024-10-15
Payer: COMMERCIAL

## 2024-10-15 DIAGNOSIS — E78.2 MIXED HYPERLIPIDEMIA: ICD-10-CM

## 2024-10-15 PROCEDURE — 75571 CT HRT W/O DYE W/CA TEST: CPT

## 2024-10-21 ENCOUNTER — TELEPHONE (OUTPATIENT)
Dept: FAMILY MEDICINE CLINIC | Facility: CLINIC | Age: 64
End: 2024-10-21

## 2024-10-21 NOTE — RESULT ENCOUNTER NOTE
Juan Murdock, Your CT calcium score came back at 0 which is great news. When we use the coronary calcium score to help decide your risk factor for a coronary event in 10 years your risk drops down to 4.8% which is considered low. When we didn't have the data of the calcium score your risk score was 15.8% which is considered intermediate. I would recommend to see what impact your dietary changes have on your cholesterol numbers in 4-6 months, if they improve the levels we can continue to monitor but if not then I would pull in cardiology to discuss repatha injection for cholesterol since you did not tolerate statins. You do not have plaque currently but if we keep high levels of cholesterol circulating plaque can build up and we want to be more proactive then reactive.

## 2024-10-21 NOTE — TELEPHONE ENCOUNTER
----- Message from PACO New sent at 10/21/2024  9:04 AM EDT -----  Juan Murdock, Your CT calcium score came back at 0 which is great news. When we use the coronary calcium score to help decide your risk factor for a coronary event in 10 years your risk drops down to 4.8% which is considered low. When we didn't have the data of the calcium score your risk score was 15.8% which is considered intermediate. I would recommend to see what impact your dietary changes have on your cholesterol numbers in 4-6 months, if they improve the levels we can continue to monitor but if not then I would pull in cardiology to discuss repatha injection for cholesterol since you did not tolerate statins. You do not have plaque currently but if we keep high levels of cholesterol circulating plaque can build up and we want to be more proactive then reactive.

## 2024-12-22 DIAGNOSIS — G40.909 SEIZURE DISORDER (HCC): ICD-10-CM

## 2024-12-24 RX ORDER — PHENYTOIN SODIUM 100 MG/1
100 CAPSULE, EXTENDED RELEASE ORAL 3 TIMES DAILY
Qty: 270 CAPSULE | Refills: 1 | Status: SHIPPED | OUTPATIENT
Start: 2024-12-24

## 2024-12-30 ENCOUNTER — OFFICE VISIT (OUTPATIENT)
Dept: FAMILY MEDICINE CLINIC | Facility: CLINIC | Age: 64
End: 2024-12-30
Payer: MEDICARE

## 2024-12-30 VITALS
HEART RATE: 110 BPM | OXYGEN SATURATION: 97 % | DIASTOLIC BLOOD PRESSURE: 74 MMHG | HEIGHT: 67 IN | BODY MASS INDEX: 40.02 KG/M2 | WEIGHT: 255 LBS | SYSTOLIC BLOOD PRESSURE: 122 MMHG | TEMPERATURE: 97.2 F

## 2024-12-30 DIAGNOSIS — M79.10 MYALGIA: ICD-10-CM

## 2024-12-30 DIAGNOSIS — M25.50 ARTHRALGIA OF MULTIPLE SITES: Primary | ICD-10-CM

## 2024-12-30 PROCEDURE — G2211 COMPLEX E/M VISIT ADD ON: HCPCS | Performed by: NURSE PRACTITIONER

## 2024-12-30 PROCEDURE — 99214 OFFICE O/P EST MOD 30 MIN: CPT | Performed by: NURSE PRACTITIONER

## 2024-12-30 RX ORDER — PREDNISONE 10 MG/1
TABLET ORAL
Qty: 30 TABLET | Refills: 0 | Status: SHIPPED | OUTPATIENT
Start: 2024-12-30 | End: 2025-01-11

## 2024-12-30 NOTE — PROGRESS NOTES
Name: Mathieu Schneider      : 1960      MRN: [id number]  Encounter Provider: PACO Hernandez  Encounter Date: 2024   Encounter department: [facility] ([facility]  :  Assessment & Plan  Arthralgia of multiple sites  Check labs r/o rheumatological vs osteoarthritic    Orders:    Sedimentation rate, automated; Future    Lyme Total AB W Reflex to IGM/IGG; Future    Rheumatoid Arthritis Factor; Future    LO Screen w/Reflex Cascade; Future    Basic metabolic panel; Future    predniSONE 10 mg tablet; Take 4 tablets (40 mg total) by mouth daily for 3 days, THEN 3 tablets (30 mg total) daily for 3 days, THEN 2 tablets (20 mg total) daily for 3 days, THEN 1 tablet (10 mg total) daily for 3 days.    Myalgia  Check labs r/o rheumatological vs osteoarthritic  Orders:    Sedimentation rate, automated; Future    Lyme Total AB W Reflex to IGM/IGG; Future    Rheumatoid Arthritis Factor; Future    LO Screen w/Reflex Cascade; Future    Basic metabolic panel; Future           History of Present Illness       Here today for pain in ankles, knees, back, bottoms of feet, back of calves, wrists started in November, back in September had complaints of back and shoulder pain that has worsened. Feels more joint related. Pain rotates from spot to spot. Now getting aleena horses/cramps in calves. Not getting often but he is getting them now. He feels he is 60% okay during day, pain is worse upon awakening and worse at night time. Hard for him transition. He will use advil which does help his pain, it will allow him to sleep. He will use very intermittently. Tylenol is not as effective. He hasn't noticed swelling. No rashes anywhere. No fevers or night sweats.     Maternal Aunt with Lupus    Right thumb nail has become brittle, ridged, flaking. Within last year- feels it has gotten worse.           Review of Systems   Constitutional:  Negative for activity change, appetite change, chills, diaphoresis, fatigue and  "unexpected weight change.   HENT:  Negative for congestion, ear discharge, ear pain, hearing loss, nosebleeds and rhinorrhea.    Eyes:  Negative for pain, redness, itching and visual disturbance.   Respiratory:  Negative for cough, choking, chest tightness and shortness of breath.    Cardiovascular:  Negative for chest pain and leg swelling.   Gastrointestinal:  Negative for abdominal pain, blood in stool, constipation, diarrhea and nausea.   Endocrine: Negative for cold intolerance, polydipsia and polyphagia.   Genitourinary:  Negative for dysuria, frequency, hematuria and urgency.   Musculoskeletal:  Positive for arthralgias and myalgias. Negative for back pain, gait problem, joint swelling, neck pain and neck stiffness.   Skin:  Negative for color change and rash.        Nail changes on right hand     Allergic/Immunologic: Negative for environmental allergies and food allergies.   Neurological:  Negative for dizziness, tremors, seizures, speech difficulty, numbness and headaches.   Hematological:  Negative for adenopathy. Does not bruise/bleed easily.   Psychiatric/Behavioral:  Negative for behavioral problems, dysphoric mood, hallucinations and self-injury.        Objective   /74   Pulse (!) 110   Temp (!) 97.2 °F (36.2 °C) (Tympanic)   Ht 5' 7\" (1.702 m)   Wt 116 kg (255 lb)   SpO2 97%   BMI 39.94 kg/m²      Physical Exam  Vitals reviewed.   Constitutional:       General: He is not in acute distress.     Appearance: Normal appearance. He is obese.   HENT:      Head: Normocephalic.   Eyes:      General: No scleral icterus.  Cardiovascular:      Rate and Rhythm: Normal rate and regular rhythm.   Pulmonary:      Effort: No respiratory distress.      Breath sounds: Normal breath sounds.   Neurological:      Mental Status: He is alert and oriented to person, place, and time.   Psychiatric:         Mood and Affect: Mood normal.         Behavior: Behavior normal.         "

## 2024-12-30 NOTE — PATIENT INSTRUCTIONS
Check labs  You can try glucosamine-chonditrin supplement, CoQ10  Prednisone taper as directed, take with food in mornings  Magnesium glycinate or magnesium oxide 200-400mg daily at bedtime

## 2025-01-04 LAB
ANA TITR SER IF: NEGATIVE {TITER}
B BURGDOR IGG+IGM SER QL IA: NEGATIVE
BUN SERPL-MCNC: 20 MG/DL (ref 8–27)
BUN/CREAT SERPL: 14 (ref 10–24)
CALCIUM SERPL-MCNC: 9 MG/DL (ref 8.6–10.2)
CHLORIDE SERPL-SCNC: 102 MMOL/L (ref 96–106)
CO2 SERPL-SCNC: 26 MMOL/L (ref 20–29)
CREAT SERPL-MCNC: 1.45 MG/DL (ref 0.76–1.27)
EGFR: 53 ML/MIN/1.73
ERYTHROCYTE [SEDIMENTATION RATE] IN BLOOD BY WESTERGREN METHOD: 4 MM/HR (ref 0–30)
GLUCOSE SERPL-MCNC: 86 MG/DL (ref 70–99)
LABORATORY COMMENT REPORT: NORMAL
POTASSIUM SERPL-SCNC: 4.6 MMOL/L (ref 3.5–5.2)
RHEUMATOID FACT SERPL-ACNC: 10.2 IU/ML
SODIUM SERPL-SCNC: 141 MMOL/L (ref 134–144)

## 2025-01-06 ENCOUNTER — RESULTS FOLLOW-UP (OUTPATIENT)
Dept: FAMILY MEDICINE CLINIC | Facility: CLINIC | Age: 65
End: 2025-01-06

## 2025-01-06 NOTE — TELEPHONE ENCOUNTER
----- Message from PACO New sent at 1/6/2025 11:30 AM EST -----  Juan Murdock, Your labs were negative for lyme, inflammatory markers, autoimmune and rheumatoid factors. Your kidney function shows it is declined though. Creatinine is higher than were you have been in last year. Recommend adequate hydration, no NSAIDs, tylenol only and we will repeat in 7-10 days, if no improvement will recommend seeing a nephrologist. Suspect the pain is possibly more degenerative (wear and tear). Did you get improvement from the prednisone?

## 2025-01-06 NOTE — RESULT ENCOUNTER NOTE
Juan Murdock, Your labs were negative for lyme, inflammatory markers, autoimmune and rheumatoid factors. Your kidney function shows it is declined though. Creatinine is higher than were you have been in last year. Recommend adequate hydration, no NSAIDs, tylenol only and we will repeat in 7-10 days, if no improvement will recommend seeing a nephrologist. Suspect the pain is possibly more degenerative (wear and tear). Did you get improvement from the prednisone?

## 2025-01-06 NOTE — TELEPHONE ENCOUNTER
Informed patient. The steroid helped. He will go get the bloodwork in 7-10 days. He would like the lab sent to [facility]

## 2025-01-08 DIAGNOSIS — N18.31 STAGE 3A CHRONIC KIDNEY DISEASE (HCC): Primary | ICD-10-CM

## 2025-01-08 NOTE — TELEPHONE ENCOUNTER
----- Message from PACO New sent at 1/8/2025  3:25 PM EST -----  order placed for [facility]  ----- Message -----  From: Kirstin Sharma MA  Sent: 1/6/2025   5:02 PM EST  To: PACO Hernandez

## 2025-01-11 DIAGNOSIS — I10 BENIGN ESSENTIAL HYPERTENSION: ICD-10-CM

## 2025-01-12 RX ORDER — AMLODIPINE BESYLATE 10 MG/1
10 TABLET ORAL DAILY
Qty: 90 TABLET | Refills: 1 | Status: SHIPPED | OUTPATIENT
Start: 2025-01-12

## 2025-06-27 DIAGNOSIS — G40.909 SEIZURE DISORDER (HCC): ICD-10-CM

## 2025-06-27 RX ORDER — PHENYTOIN SODIUM 100 MG/1
100 CAPSULE, EXTENDED RELEASE ORAL 3 TIMES DAILY
Qty: 270 CAPSULE | Refills: 1 | Status: SHIPPED | OUTPATIENT
Start: 2025-06-27

## (undated) DEVICE — SYRINGE 30ML LL

## (undated) DEVICE — 3M™ STERI-DRAPE™ U-DRAPE 1015: Brand: STERI-DRAPE™

## (undated) DEVICE — ARTHROSCOPY FLOOR MAT

## (undated) DEVICE — FILTER STRAW 1.7

## (undated) DEVICE — BETHLEHEM UNIV MAJ EXT ,KIT: Brand: CARDINAL HEALTH

## (undated) DEVICE — GLOVE INDICATOR PI UNDERGLOVE SZ 8.5 BLUE

## (undated) DEVICE — CAST PADDING 6 IN STERILE

## (undated) DEVICE — OCCLUSIVE GAUZE STRIP,3% BISMUTH TRIBROMOPHENATE IN PETROLATUM BLEND: Brand: XEROFORM

## (undated) DEVICE — STIRRUP STRAP ADULT DISP

## (undated) DEVICE — 4-PORT MANIFOLD: Brand: NEPTUNE 2

## (undated) DEVICE — SPONGE SCRUB 4 PCT CHLORHEXIDINE

## (undated) DEVICE — BETHLEHEM UNIVERSAL  ARTHRO PK: Brand: CARDINAL HEALTH

## (undated) DEVICE — STOCKINETTE IMPERVIOUS LG

## (undated) DEVICE — BLADE SHAVER DISSECTOR  4MM 13CM CRV COOLCUT

## (undated) DEVICE — SUT MONOCRYL 4-0 PS-2 27 IN Y426H

## (undated) DEVICE — BOWL: 16OZ PEELPOUCH 75/CS 16/PLT: Brand: MEDEGEN MEDICAL PRODUCTS, LLC

## (undated) DEVICE — 3M™ STERI-STRIP™ REINFORCED ADHESIVE SKIN CLOSURES, R1547, 1/2 IN X 4 IN (12 MM X 100 MM), 6 STRIPS/ENVELOPE: Brand: 3M™ STERI-STRIP™

## (undated) DEVICE — SURGI KIT INSTRUMENT ORGANIZER

## (undated) DEVICE — WEBRIL 6 IN UNSTERILE

## (undated) DEVICE — GLOVE INDICATOR PI UNDERGLOVE SZ 7 BLUE

## (undated) DEVICE — GLOVE SRG LF STRL BGL SKNSNS 6.5 PF

## (undated) DEVICE — NEEDLE 18 G X 1 1/2

## (undated) DEVICE — GLOVE SRG BIOGEL 8.5

## (undated) DEVICE — CHLORAPREP HI-LITE 26ML ORANGE

## (undated) DEVICE — SCD SEQUENTIAL COMPRESSION COMFORT SLEEVE MEDIUM KNEE LENGTH: Brand: KENDALL SCD

## (undated) DEVICE — BANDAGE, ESMARK LF STR 6"X9' (20/CS): Brand: CYPRESS

## (undated) DEVICE — NEEDLE BLUNT 18 G X 1 1/2IN

## (undated) DEVICE — DRAPE SHEET THREE QUARTER

## (undated) DEVICE — TIBURON EXTREMITY SHEET: Brand: CONVERTORS

## (undated) DEVICE — GLOVE SRG LF STRL BGL SKNSNS 8.5 PF

## (undated) DEVICE — 3M™ IOBAN™ 2 ANTIMICROBIAL INCISE DRAPE 6650EZ: Brand: IOBAN™ 2

## (undated) DEVICE — ACE WRAP 6 IN UNSTERILE

## (undated) DEVICE — LIGHT GLOVE GREEN

## (undated) DEVICE — PADDING CAST 6IN COTTON STRL

## (undated) DEVICE — STOCKINETTE,IMPERVIOUS,12X48,STERILE: Brand: MEDLINE

## (undated) DEVICE — GLOVE SRG BIOGEL 7

## (undated) DEVICE — INTENDED FOR TISSUE SEPARATION, AND OTHER PROCEDURES THAT REQUIRE A SHARP SURGICAL BLADE TO PUNCTURE OR CUT.: Brand: BARD-PARKER ® CARBON RIB-BACK BLADES

## (undated) DEVICE — INTENDED FOR TISSUE SEPARATION, AND OTHER PROCEDURES THAT REQUIRE A SHARP SURGICAL BLADE TO PUNCTURE OR CUT.: Brand: BARD-PARKER ® SAFETYLOCK CARBON RIB-BACK BLADES

## (undated) DEVICE — TUBING ARTHROSCOPIC WAVE  MAIN PUMP

## (undated) DEVICE — CUFF TOURNIQUET 30 X 4 IN QUICK CONNECT DISP 1BLA

## (undated) DEVICE — ABDOMINAL PAD: Brand: DERMACEA

## (undated) DEVICE — TUBING SUCTION 5MM X 12 FT

## (undated) DEVICE — GAUZE SPONGES,16 PLY: Brand: CURITY

## (undated) DEVICE — DISPOSABLE OR TOWEL: Brand: CARDINAL HEALTH

## (undated) DEVICE — STERILE POLYISOPRENE POWDER-FREE SURGICAL GLOVES WITH EMOLLIENT COATING: Brand: PROTEXIS

## (undated) DEVICE — BLADE SHAVER DISSECTOR 4MM 13CM COOLCUT

## (undated) DEVICE — SYRINGE 3ML LL

## (undated) DEVICE — NEEDLE SPINAL18G X 3.5 IN QUINCKE

## (undated) DEVICE — EXTREMITY DRAPE W/ARMBOARD COVERS: Brand: CONVERTORS